# Patient Record
Sex: FEMALE | Race: BLACK OR AFRICAN AMERICAN | Employment: FULL TIME | URBAN - METROPOLITAN AREA
[De-identification: names, ages, dates, MRNs, and addresses within clinical notes are randomized per-mention and may not be internally consistent; named-entity substitution may affect disease eponyms.]

---

## 2018-01-19 ENCOUNTER — HOSPITAL ENCOUNTER (OUTPATIENT)
Dept: OTHER | Age: 38
Discharge: HOME | End: 2018-01-19
Attending: PREVENTIVE MEDICINE | Admitting: PREVENTIVE MEDICINE

## 2018-01-22 LAB
MUV IGG SER QL: 21.6
NIL (NEGATIVE) SPOT CONTROL: 0
PANEL A SPOT COUNT: 0
PANEL B SPOT COUNT: 0
POSITIVE CONTROL SPOT COUNT: >20
RUBELLA ANTIBODY IGG: 117
RUBEOLA (MEASLES) AB IGG: 35.5
TB CELL IMMUNE MEASURE: NEGATIVE
VARICELLA-ZOSTER VIRUS AB, IGG: 1543

## 2018-01-26 ENCOUNTER — TELEPHONE (OUTPATIENT)
Dept: CARDIOLOGY CLINIC | Age: 38
End: 2018-01-26

## 2019-01-09 ENCOUNTER — HOSPITAL ENCOUNTER (EMERGENCY)
Age: 39
Discharge: HOME | End: 2019-01-09
Attending: EMERGENCY MEDICINE
Payer: COMMERCIAL

## 2019-01-09 ENCOUNTER — APPOINTMENT (OUTPATIENT)
Dept: GENERAL RADIOLOGY | Age: 39
End: 2019-01-09
Payer: COMMERCIAL

## 2019-01-09 VITALS
OXYGEN SATURATION: 100 % | DIASTOLIC BLOOD PRESSURE: 101 MMHG | WEIGHT: 170 LBS | HEART RATE: 80 BPM | RESPIRATION RATE: 22 BRPM | HEIGHT: 60 IN | SYSTOLIC BLOOD PRESSURE: 155 MMHG | BODY MASS INDEX: 33.38 KG/M2 | TEMPERATURE: 97.4 F

## 2019-01-09 DIAGNOSIS — F41.1 ANXIETY STATE: ICD-10-CM

## 2019-01-09 DIAGNOSIS — R07.9 CHEST PAIN, UNSPECIFIED TYPE: Primary | ICD-10-CM

## 2019-01-09 LAB
ALBUMIN SERPL-MCNC: 4 GM/DL (ref 3.4–5)
ALP BLD-CCNC: 109 IU/L (ref 40–129)
ALT SERPL-CCNC: 15 U/L (ref 10–40)
ANION GAP SERPL CALCULATED.3IONS-SCNC: 9 MMOL/L (ref 4–16)
AST SERPL-CCNC: 17 IU/L (ref 15–37)
BASOPHILS ABSOLUTE: 0 K/CU MM
BASOPHILS RELATIVE PERCENT: 0.4 % (ref 0–1)
BILIRUB SERPL-MCNC: 0.3 MG/DL (ref 0–1)
BUN BLDV-MCNC: 13 MG/DL (ref 6–23)
CALCIUM SERPL-MCNC: 9.8 MG/DL (ref 8.3–10.6)
CHLORIDE BLD-SCNC: 108 MMOL/L (ref 99–110)
CO2: 23 MMOL/L (ref 21–32)
CREAT SERPL-MCNC: 0.8 MG/DL (ref 0.6–1.1)
DIFFERENTIAL TYPE: ABNORMAL
EOSINOPHILS ABSOLUTE: 0.2 K/CU MM
EOSINOPHILS RELATIVE PERCENT: 2 % (ref 0–3)
GFR AFRICAN AMERICAN: >60 ML/MIN/1.73M2
GFR NON-AFRICAN AMERICAN: >60 ML/MIN/1.73M2
GLUCOSE BLD-MCNC: 90 MG/DL (ref 70–99)
HCT VFR BLD CALC: 40.4 % (ref 37–47)
HEMOGLOBIN: 13.3 GM/DL (ref 12.5–16)
IMMATURE NEUTROPHIL %: 0.3 % (ref 0–0.43)
LYMPHOCYTES ABSOLUTE: 3.2 K/CU MM
LYMPHOCYTES RELATIVE PERCENT: 36.3 % (ref 24–44)
MCH RBC QN AUTO: 29.8 PG (ref 27–31)
MCHC RBC AUTO-ENTMCNC: 32.9 % (ref 32–36)
MCV RBC AUTO: 90.6 FL (ref 78–100)
MONOCYTES ABSOLUTE: 0.6 K/CU MM
MONOCYTES RELATIVE PERCENT: 6.2 % (ref 0–4)
NUCLEATED RBC %: 0 %
PDW BLD-RTO: 12.8 % (ref 11.7–14.9)
PLATELET # BLD: 264 K/CU MM (ref 140–440)
PMV BLD AUTO: 10.3 FL (ref 7.5–11.1)
POTASSIUM SERPL-SCNC: 3.6 MMOL/L (ref 3.5–5.1)
RBC # BLD: 4.46 M/CU MM (ref 4.2–5.4)
SEGMENTED NEUTROPHILS ABSOLUTE COUNT: 4.9 K/CU MM
SEGMENTED NEUTROPHILS RELATIVE PERCENT: 54.8 % (ref 36–66)
SODIUM BLD-SCNC: 140 MMOL/L (ref 135–145)
TOTAL IMMATURE NEUTOROPHIL: 0.03 K/CU MM
TOTAL NUCLEATED RBC: 0 K/CU MM
TOTAL PROTEIN: 7.9 GM/DL (ref 6.4–8.2)
TROPONIN T: <0.01 NG/ML
WBC # BLD: 8.9 K/CU MM (ref 4–10.5)

## 2019-01-09 PROCEDURE — 80053 COMPREHEN METABOLIC PANEL: CPT

## 2019-01-09 PROCEDURE — 85025 COMPLETE CBC W/AUTO DIFF WBC: CPT

## 2019-01-09 PROCEDURE — 71046 X-RAY EXAM CHEST 2 VIEWS: CPT

## 2019-01-09 PROCEDURE — 93005 ELECTROCARDIOGRAM TRACING: CPT | Performed by: EMERGENCY MEDICINE

## 2019-01-09 PROCEDURE — 6370000000 HC RX 637 (ALT 250 FOR IP): Performed by: EMERGENCY MEDICINE

## 2019-01-09 PROCEDURE — 84484 ASSAY OF TROPONIN QUANT: CPT

## 2019-01-09 PROCEDURE — 36415 COLL VENOUS BLD VENIPUNCTURE: CPT

## 2019-01-09 PROCEDURE — 99285 EMERGENCY DEPT VISIT HI MDM: CPT

## 2019-01-09 RX ORDER — ALPRAZOLAM 0.25 MG/1
0.25 TABLET ORAL 2 TIMES DAILY PRN
Qty: 5 TABLET | Refills: 0 | Status: SHIPPED | OUTPATIENT
Start: 2019-01-09 | End: 2019-03-05 | Stop reason: ALTCHOICE

## 2019-01-09 RX ORDER — ALPRAZOLAM 0.25 MG/1
0.25 TABLET ORAL ONCE
Status: COMPLETED | OUTPATIENT
Start: 2019-01-09 | End: 2019-01-09

## 2019-01-09 RX ADMIN — ALPRAZOLAM 0.25 MG: 0.25 TABLET ORAL at 20:39

## 2019-01-09 ASSESSMENT — PAIN SCALES - GENERAL: PAINLEVEL_OUTOF10: 7

## 2019-01-09 ASSESSMENT — PAIN DESCRIPTION - DESCRIPTORS: DESCRIPTORS: ACHING

## 2019-01-09 ASSESSMENT — PAIN DESCRIPTION - LOCATION: LOCATION: CHEST

## 2019-01-09 ASSESSMENT — PAIN DESCRIPTION - PAIN TYPE: TYPE: ACUTE PAIN

## 2019-01-10 PROCEDURE — 93010 ELECTROCARDIOGRAM REPORT: CPT | Performed by: INTERNAL MEDICINE

## 2019-01-11 LAB
EKG ATRIAL RATE: 85 BPM
EKG DIAGNOSIS: NORMAL
EKG P AXIS: 57 DEGREES
EKG P-R INTERVAL: 152 MS
EKG Q-T INTERVAL: 356 MS
EKG QRS DURATION: 82 MS
EKG QTC CALCULATION (BAZETT): 423 MS
EKG R AXIS: 44 DEGREES
EKG T AXIS: 50 DEGREES
EKG VENTRICULAR RATE: 85 BPM

## 2019-03-05 ENCOUNTER — HOSPITAL ENCOUNTER (EMERGENCY)
Age: 39
Discharge: HOME | End: 2019-03-05
Attending: EMERGENCY MEDICINE
Payer: COMMERCIAL

## 2019-03-05 VITALS
SYSTOLIC BLOOD PRESSURE: 161 MMHG | RESPIRATION RATE: 18 BRPM | DIASTOLIC BLOOD PRESSURE: 115 MMHG | OXYGEN SATURATION: 100 % | HEIGHT: 60 IN | HEART RATE: 67 BPM | WEIGHT: 170 LBS | TEMPERATURE: 97.8 F | BODY MASS INDEX: 33.38 KG/M2

## 2019-03-05 DIAGNOSIS — R20.2 PARESTHESIA OF BILATERAL LEGS: Primary | ICD-10-CM

## 2019-03-05 LAB
ALBUMIN SERPL-MCNC: 4.1 GM/DL (ref 3.4–5)
ALP BLD-CCNC: 101 IU/L (ref 40–129)
ALT SERPL-CCNC: 19 U/L (ref 10–40)
ANION GAP SERPL CALCULATED.3IONS-SCNC: 10 MMOL/L (ref 4–16)
AST SERPL-CCNC: 17 IU/L (ref 15–37)
BASOPHILS ABSOLUTE: 0 K/CU MM
BASOPHILS RELATIVE PERCENT: 0.7 % (ref 0–1)
BILIRUB SERPL-MCNC: 0.3 MG/DL (ref 0–1)
BUN BLDV-MCNC: 12 MG/DL (ref 6–23)
CALCIUM SERPL-MCNC: 8.5 MG/DL (ref 8.3–10.6)
CHLORIDE BLD-SCNC: 101 MMOL/L (ref 99–110)
CO2: 25 MMOL/L (ref 21–32)
CREAT SERPL-MCNC: 0.8 MG/DL (ref 0.6–1.1)
DIFFERENTIAL TYPE: ABNORMAL
EOSINOPHILS ABSOLUTE: 0.1 K/CU MM
EOSINOPHILS RELATIVE PERCENT: 2.3 % (ref 0–3)
ERYTHROCYTE SEDIMENTATION RATE: 16 MM/HR (ref 0–20)
GFR AFRICAN AMERICAN: >60 ML/MIN/1.73M2
GFR NON-AFRICAN AMERICAN: >60 ML/MIN/1.73M2
GLUCOSE BLD-MCNC: 93 MG/DL (ref 70–99)
HCG QUALITATIVE: NEGATIVE
HCT VFR BLD CALC: 42 % (ref 37–47)
HEMOGLOBIN: 13.5 GM/DL (ref 12.5–16)
IMMATURE NEUTROPHIL %: 0.2 % (ref 0–0.43)
LYMPHOCYTES ABSOLUTE: 1.8 K/CU MM
LYMPHOCYTES RELATIVE PERCENT: 31.8 % (ref 24–44)
MAGNESIUM: 1.7 MG/DL (ref 1.8–2.4)
MCH RBC QN AUTO: 29.3 PG (ref 27–31)
MCHC RBC AUTO-ENTMCNC: 32.1 % (ref 32–36)
MCV RBC AUTO: 91.1 FL (ref 78–100)
MONOCYTES ABSOLUTE: 0.4 K/CU MM
MONOCYTES RELATIVE PERCENT: 6.6 % (ref 0–4)
NUCLEATED RBC %: 0 %
PDW BLD-RTO: 12.9 % (ref 11.7–14.9)
PLATELET # BLD: 248 K/CU MM (ref 140–440)
PMV BLD AUTO: 10.4 FL (ref 7.5–11.1)
POTASSIUM SERPL-SCNC: 3.6 MMOL/L (ref 3.5–5.1)
RBC # BLD: 4.61 M/CU MM (ref 4.2–5.4)
SEGMENTED NEUTROPHILS ABSOLUTE COUNT: 3.3 K/CU MM
SEGMENTED NEUTROPHILS RELATIVE PERCENT: 58.4 % (ref 36–66)
SODIUM BLD-SCNC: 136 MMOL/L (ref 135–145)
TOTAL IMMATURE NEUTOROPHIL: 0.01 K/CU MM
TOTAL NUCLEATED RBC: 0 K/CU MM
TOTAL PROTEIN: 7.5 GM/DL (ref 6.4–8.2)
WBC # BLD: 5.6 K/CU MM (ref 4–10.5)

## 2019-03-05 PROCEDURE — 99283 EMERGENCY DEPT VISIT LOW MDM: CPT

## 2019-03-05 PROCEDURE — 85652 RBC SED RATE AUTOMATED: CPT

## 2019-03-05 PROCEDURE — 85025 COMPLETE CBC W/AUTO DIFF WBC: CPT

## 2019-03-05 PROCEDURE — 83735 ASSAY OF MAGNESIUM: CPT

## 2019-03-05 PROCEDURE — 84703 CHORIONIC GONADOTROPIN ASSAY: CPT

## 2019-03-05 PROCEDURE — 80053 COMPREHEN METABOLIC PANEL: CPT

## 2019-03-05 RX ORDER — PREDNISONE 20 MG/1
40 TABLET ORAL DAILY
Qty: 10 TABLET | Refills: 0 | Status: SHIPPED | OUTPATIENT
Start: 2019-03-05 | End: 2019-03-10

## 2019-03-05 RX ORDER — METOPROLOL SUCCINATE 25 MG/1
25 TABLET, EXTENDED RELEASE ORAL DAILY
Qty: 30 TABLET | Refills: 0 | Status: SHIPPED | OUTPATIENT
Start: 2019-03-05

## 2019-03-05 RX ORDER — METOPROLOL SUCCINATE 50 MG/1
50 TABLET, EXTENDED RELEASE ORAL DAILY
Qty: 30 TABLET | Refills: 0 | Status: SHIPPED | OUTPATIENT
Start: 2019-03-05 | End: 2019-03-05 | Stop reason: SDUPTHER

## 2019-03-13 ENCOUNTER — HOSPITAL ENCOUNTER (EMERGENCY)
Age: 39
Discharge: HOME | End: 2019-03-13
Attending: EMERGENCY MEDICINE
Payer: COMMERCIAL

## 2019-03-13 ENCOUNTER — APPOINTMENT (OUTPATIENT)
Dept: CT IMAGING | Age: 39
End: 2019-03-13
Payer: COMMERCIAL

## 2019-03-13 VITALS
HEIGHT: 60 IN | OXYGEN SATURATION: 100 % | WEIGHT: 170 LBS | RESPIRATION RATE: 16 BRPM | HEART RATE: 78 BPM | TEMPERATURE: 98 F | SYSTOLIC BLOOD PRESSURE: 117 MMHG | DIASTOLIC BLOOD PRESSURE: 79 MMHG | BODY MASS INDEX: 33.38 KG/M2

## 2019-03-13 DIAGNOSIS — M51.36 DEGENERATIVE DISC DISEASE, LUMBAR: ICD-10-CM

## 2019-03-13 DIAGNOSIS — R20.2 BILATERAL LEG PARESTHESIA: Primary | ICD-10-CM

## 2019-03-13 LAB
BACTERIA: ABNORMAL /HPF
BASOPHILS ABSOLUTE: 0 K/CU MM
BASOPHILS RELATIVE PERCENT: 0.5 % (ref 0–1)
BILIRUBIN URINE: NEGATIVE MG/DL
BLOOD, URINE: NEGATIVE
CLARITY: CLEAR
COLOR: YELLOW
DIFFERENTIAL TYPE: ABNORMAL
EOSINOPHILS ABSOLUTE: 0.2 K/CU MM
EOSINOPHILS RELATIVE PERCENT: 2.2 % (ref 0–3)
GLUCOSE, URINE: NEGATIVE MG/DL
HCT VFR BLD CALC: 40.4 % (ref 37–47)
HEMOGLOBIN: 12.9 GM/DL (ref 12.5–16)
IMMATURE NEUTROPHIL %: 0.6 % (ref 0–0.43)
INTERPRETATION: NORMAL
KETONES, URINE: NEGATIVE MG/DL
LEUKOCYTE ESTERASE, URINE: NEGATIVE
LYMPHOCYTES ABSOLUTE: 2.9 K/CU MM
LYMPHOCYTES RELATIVE PERCENT: 36.5 % (ref 24–44)
MCH RBC QN AUTO: 29.1 PG (ref 27–31)
MCHC RBC AUTO-ENTMCNC: 31.9 % (ref 32–36)
MCV RBC AUTO: 91.2 FL (ref 78–100)
MONOCYTES ABSOLUTE: 0.7 K/CU MM
MONOCYTES RELATIVE PERCENT: 8.6 % (ref 0–4)
MUCUS: ABNORMAL HPF
NITRITE URINE, QUANTITATIVE: NEGATIVE
NUCLEATED RBC %: 0 %
PDW BLD-RTO: 12.9 % (ref 11.7–14.9)
PH, URINE: 6 (ref 5–8)
PLATELET # BLD: 250 K/CU MM (ref 140–440)
PMV BLD AUTO: 10.7 FL (ref 7.5–11.1)
PREGNANCY, URINE: NEGATIVE
PROTEIN UA: NEGATIVE MG/DL
RBC # BLD: 4.43 M/CU MM (ref 4.2–5.4)
RBC URINE: 1 /HPF (ref 0–6)
REASON FOR REJECTION: NORMAL
REASON FOR REJECTION: NORMAL
REJECTED TEST: NORMAL
SEGMENTED NEUTROPHILS ABSOLUTE COUNT: 4.1 K/CU MM
SEGMENTED NEUTROPHILS RELATIVE PERCENT: 51.6 % (ref 36–66)
SPECIFIC GRAVITY UA: 1.03 (ref 1–1.03)
SPECIFIC GRAVITY, URINE: 1.03 (ref 1–1.03)
SQUAMOUS EPITHELIAL: 6 /HPF
TOTAL IMMATURE NEUTOROPHIL: 0.05 K/CU MM
TOTAL NUCLEATED RBC: 0 K/CU MM
TRICHOMONAS: ABNORMAL /HPF
UROBILINOGEN, URINE: NORMAL MG/DL (ref 0.2–1)
WBC # BLD: 7.9 K/CU MM (ref 4–10.5)
WBC UA: 1 /HPF (ref 0–5)

## 2019-03-13 PROCEDURE — 80053 COMPREHEN METABOLIC PANEL: CPT

## 2019-03-13 PROCEDURE — 84703 CHORIONIC GONADOTROPIN ASSAY: CPT

## 2019-03-13 PROCEDURE — 72131 CT LUMBAR SPINE W/O DYE: CPT

## 2019-03-13 PROCEDURE — 6370000000 HC RX 637 (ALT 250 FOR IP): Performed by: EMERGENCY MEDICINE

## 2019-03-13 PROCEDURE — 99283 EMERGENCY DEPT VISIT LOW MDM: CPT

## 2019-03-13 PROCEDURE — 81001 URINALYSIS AUTO W/SCOPE: CPT

## 2019-03-13 PROCEDURE — 85025 COMPLETE CBC W/AUTO DIFF WBC: CPT

## 2019-03-13 PROCEDURE — 70450 CT HEAD/BRAIN W/O DYE: CPT

## 2019-03-13 PROCEDURE — 81025 URINE PREGNANCY TEST: CPT

## 2019-03-13 RX ORDER — LIDOCAINE 4 G/G
1 PATCH TOPICAL DAILY
Status: DISCONTINUED | OUTPATIENT
Start: 2019-03-13 | End: 2019-03-13 | Stop reason: HOSPADM

## 2019-03-13 RX ORDER — METHOCARBAMOL 500 MG/1
500 TABLET, FILM COATED ORAL 4 TIMES DAILY
Qty: 20 TABLET | Refills: 0 | Status: SHIPPED | OUTPATIENT
Start: 2019-03-13 | End: 2021-04-17

## 2019-03-13 RX ORDER — NAPROXEN 500 MG/1
500 TABLET ORAL 2 TIMES DAILY PRN
Qty: 30 TABLET | Refills: 0 | Status: SHIPPED | OUTPATIENT
Start: 2019-03-13 | End: 2019-05-17 | Stop reason: SDUPTHER

## 2019-03-13 RX ORDER — NAPROXEN 250 MG/1
500 TABLET ORAL ONCE
Status: COMPLETED | OUTPATIENT
Start: 2019-03-13 | End: 2019-03-13

## 2019-03-13 RX ORDER — PREDNISONE 20 MG/1
60 TABLET ORAL ONCE
Status: COMPLETED | OUTPATIENT
Start: 2019-03-13 | End: 2019-03-13

## 2019-03-13 RX ADMIN — NAPROXEN 500 MG: 250 TABLET ORAL at 09:56

## 2019-03-13 RX ADMIN — PREDNISONE 60 MG: 20 TABLET ORAL at 09:56

## 2019-03-13 ASSESSMENT — PAIN SCALES - GENERAL: PAINLEVEL_OUTOF10: 7

## 2019-04-13 ENCOUNTER — APPOINTMENT (OUTPATIENT)
Dept: GENERAL RADIOLOGY | Age: 39
End: 2019-04-13
Payer: COMMERCIAL

## 2019-04-13 ENCOUNTER — HOSPITAL ENCOUNTER (OUTPATIENT)
Age: 39
Setting detail: OBSERVATION
Discharge: HOME | End: 2019-04-14
Attending: EMERGENCY MEDICINE | Admitting: HOSPITALIST
Payer: COMMERCIAL

## 2019-04-13 ENCOUNTER — APPOINTMENT (OUTPATIENT)
Dept: MRI IMAGING | Age: 39
End: 2019-04-13
Payer: COMMERCIAL

## 2019-04-13 ENCOUNTER — APPOINTMENT (OUTPATIENT)
Dept: CT IMAGING | Age: 39
End: 2019-04-13
Payer: COMMERCIAL

## 2019-04-13 DIAGNOSIS — H53.2 DOUBLE VISION: Primary | ICD-10-CM

## 2019-04-13 DIAGNOSIS — R20.2 PARESTHESIA: ICD-10-CM

## 2019-04-13 PROBLEM — H53.9 VISION CHANGES: Status: ACTIVE | Noted: 2019-04-13

## 2019-04-13 LAB
ALBUMIN SERPL-MCNC: 4 GM/DL (ref 3.4–5)
ALP BLD-CCNC: 99 IU/L (ref 40–129)
ALT SERPL-CCNC: 33 U/L (ref 10–40)
ANION GAP SERPL CALCULATED.3IONS-SCNC: 10 MMOL/L (ref 4–16)
AST SERPL-CCNC: 41 IU/L (ref 15–37)
ATYPICAL LYMPHOCYTE ABSOLUTE COUNT: ABNORMAL
BACTERIA: ABNORMAL /HPF
BANDED NEUTROPHILS ABSOLUTE COUNT: 0.16 K/CU MM
BANDED NEUTROPHILS RELATIVE PERCENT: 7 % (ref 5–11)
BASOPHILS ABSOLUTE: 0 K/CU MM
BASOPHILS RELATIVE PERCENT: 1 % (ref 0–1)
BILIRUB SERPL-MCNC: 0.7 MG/DL (ref 0–1)
BILIRUBIN URINE: NEGATIVE MG/DL
BLOOD, URINE: ABNORMAL
BUN BLDV-MCNC: 11 MG/DL (ref 6–23)
CALCIUM SERPL-MCNC: 8.9 MG/DL (ref 8.3–10.6)
CHLORIDE BLD-SCNC: 107 MMOL/L (ref 99–110)
CLARITY: CLEAR
CO2: 22 MMOL/L (ref 21–32)
COLOR: YELLOW
CREAT SERPL-MCNC: 0.8 MG/DL (ref 0.6–1.1)
DIFFERENTIAL TYPE: ABNORMAL
EOSINOPHILS ABSOLUTE: 0 K/CU MM
EOSINOPHILS RELATIVE PERCENT: 1 % (ref 0–3)
GFR AFRICAN AMERICAN: >60 ML/MIN/1.73M2
GFR NON-AFRICAN AMERICAN: >60 ML/MIN/1.73M2
GLUCOSE BLD-MCNC: 106 MG/DL (ref 70–99)
GLUCOSE, URINE: NEGATIVE MG/DL
HCG QUALITATIVE: NEGATIVE
HCT VFR BLD CALC: 39.6 % (ref 37–47)
HEMOGLOBIN: 13.3 GM/DL (ref 12.5–16)
INR BLD: 1.16 INDEX
KETONES, URINE: NEGATIVE MG/DL
LEUKOCYTE ESTERASE, URINE: NEGATIVE
LYMPHOCYTES ABSOLUTE: 0.6 K/CU MM
LYMPHOCYTES RELATIVE PERCENT: 27 % (ref 24–44)
MCH RBC QN AUTO: 29 PG (ref 27–31)
MCHC RBC AUTO-ENTMCNC: 33.6 % (ref 32–36)
MCV RBC AUTO: 86.5 FL (ref 78–100)
MONOCYTES ABSOLUTE: 0.1 K/CU MM
MONOCYTES RELATIVE PERCENT: 4 % (ref 0–4)
MUCUS: ABNORMAL HPF
NITRITE URINE, QUANTITATIVE: NEGATIVE
PDW BLD-RTO: 12.8 % (ref 11.7–14.9)
PH, URINE: 5 (ref 5–8)
PLATELET # BLD: 129 K/CU MM (ref 140–440)
PLT MORPHOLOGY: ABNORMAL
PMV BLD AUTO: 10 FL (ref 7.5–11.1)
POTASSIUM SERPL-SCNC: 3.8 MMOL/L (ref 3.5–5.1)
PROTEIN UA: NEGATIVE MG/DL
PROTHROMBIN TIME: 13.2 SECONDS (ref 9.12–12.5)
RBC # BLD: 4.58 M/CU MM (ref 4.2–5.4)
RBC # BLD: ABNORMAL 10*6/UL
RBC URINE: 4 /HPF (ref 0–6)
SEGMENTED NEUTROPHILS ABSOLUTE COUNT: 1.4 K/CU MM
SEGMENTED NEUTROPHILS RELATIVE PERCENT: 60 % (ref 36–66)
SODIUM BLD-SCNC: 139 MMOL/L (ref 135–145)
SPECIFIC GRAVITY UA: 1.01 (ref 1–1.03)
SQUAMOUS EPITHELIAL: 3 /HPF
TOTAL PROTEIN: 7.7 GM/DL (ref 6.4–8.2)
TRICHOMONAS: ABNORMAL /HPF
TROPONIN T: <0.01 NG/ML
UROBILINOGEN, URINE: NORMAL MG/DL (ref 0.2–1)
WBC # BLD: 2.3 K/CU MM (ref 4–10.5)
WBC UA: 1 /HPF (ref 0–5)

## 2019-04-13 PROCEDURE — 84484 ASSAY OF TROPONIN QUANT: CPT

## 2019-04-13 PROCEDURE — 85007 BL SMEAR W/DIFF WBC COUNT: CPT

## 2019-04-13 PROCEDURE — 6360000002 HC RX W HCPCS: Performed by: HOSPITALIST

## 2019-04-13 PROCEDURE — 93005 ELECTROCARDIOGRAM TRACING: CPT | Performed by: EMERGENCY MEDICINE

## 2019-04-13 PROCEDURE — 70553 MRI BRAIN STEM W/O & W/DYE: CPT

## 2019-04-13 PROCEDURE — 85027 COMPLETE CBC AUTOMATED: CPT

## 2019-04-13 PROCEDURE — A9579 GAD-BASE MR CONTRAST NOS,1ML: HCPCS | Performed by: HOSPITALIST

## 2019-04-13 PROCEDURE — 70450 CT HEAD/BRAIN W/O DYE: CPT

## 2019-04-13 PROCEDURE — 2580000003 HC RX 258: Performed by: HOSPITALIST

## 2019-04-13 PROCEDURE — 93010 ELECTROCARDIOGRAM REPORT: CPT | Performed by: INTERNAL MEDICINE

## 2019-04-13 PROCEDURE — G0378 HOSPITAL OBSERVATION PER HR: HCPCS

## 2019-04-13 PROCEDURE — 94761 N-INVAS EAR/PLS OXIMETRY MLT: CPT

## 2019-04-13 PROCEDURE — 71045 X-RAY EXAM CHEST 1 VIEW: CPT

## 2019-04-13 PROCEDURE — 85610 PROTHROMBIN TIME: CPT

## 2019-04-13 PROCEDURE — 99285 EMERGENCY DEPT VISIT HI MDM: CPT

## 2019-04-13 PROCEDURE — 6360000004 HC RX CONTRAST MEDICATION: Performed by: HOSPITALIST

## 2019-04-13 PROCEDURE — 81001 URINALYSIS AUTO W/SCOPE: CPT

## 2019-04-13 PROCEDURE — 84703 CHORIONIC GONADOTROPIN ASSAY: CPT

## 2019-04-13 PROCEDURE — 6370000000 HC RX 637 (ALT 250 FOR IP): Performed by: HOSPITALIST

## 2019-04-13 PROCEDURE — 96372 THER/PROPH/DIAG INJ SC/IM: CPT

## 2019-04-13 PROCEDURE — 80053 COMPREHEN METABOLIC PANEL: CPT

## 2019-04-13 PROCEDURE — 72156 MRI NECK SPINE W/O & W/DYE: CPT

## 2019-04-13 RX ORDER — ONDANSETRON 2 MG/ML
4 INJECTION INTRAMUSCULAR; INTRAVENOUS EVERY 6 HOURS PRN
Status: DISCONTINUED | OUTPATIENT
Start: 2019-04-13 | End: 2019-04-14 | Stop reason: HOSPADM

## 2019-04-13 RX ORDER — SODIUM CHLORIDE 0.9 % (FLUSH) 0.9 %
10 SYRINGE (ML) INJECTION EVERY 12 HOURS SCHEDULED
Status: DISCONTINUED | OUTPATIENT
Start: 2019-04-13 | End: 2019-04-14 | Stop reason: HOSPADM

## 2019-04-13 RX ORDER — METHOCARBAMOL 500 MG/1
500 TABLET, FILM COATED ORAL 4 TIMES DAILY
Status: DISCONTINUED | OUTPATIENT
Start: 2019-04-13 | End: 2019-04-14 | Stop reason: HOSPADM

## 2019-04-13 RX ORDER — METOPROLOL SUCCINATE 25 MG/1
25 TABLET, EXTENDED RELEASE ORAL DAILY
Status: DISCONTINUED | OUTPATIENT
Start: 2019-04-13 | End: 2019-04-14 | Stop reason: HOSPADM

## 2019-04-13 RX ORDER — NAPROXEN 250 MG/1
500 TABLET ORAL 2 TIMES DAILY PRN
Status: DISCONTINUED | OUTPATIENT
Start: 2019-04-13 | End: 2019-04-14 | Stop reason: HOSPADM

## 2019-04-13 RX ORDER — ACETAMINOPHEN 325 MG/1
650 TABLET ORAL EVERY 4 HOURS PRN
Status: DISCONTINUED | OUTPATIENT
Start: 2019-04-13 | End: 2019-04-14 | Stop reason: HOSPADM

## 2019-04-13 RX ORDER — SODIUM CHLORIDE 0.9 % (FLUSH) 0.9 %
10 SYRINGE (ML) INJECTION PRN
Status: DISCONTINUED | OUTPATIENT
Start: 2019-04-13 | End: 2019-04-14 | Stop reason: HOSPADM

## 2019-04-13 RX ADMIN — METOPROLOL SUCCINATE 25 MG: 25 TABLET, EXTENDED RELEASE ORAL at 12:08

## 2019-04-13 RX ADMIN — SODIUM CHLORIDE, PRESERVATIVE FREE 10 ML: 5 INJECTION INTRAVENOUS at 20:56

## 2019-04-13 RX ADMIN — METHOCARBAMOL TABLETS 500 MG: 500 TABLET, COATED ORAL at 12:08

## 2019-04-13 RX ADMIN — METHOCARBAMOL TABLETS 500 MG: 500 TABLET, COATED ORAL at 17:01

## 2019-04-13 RX ADMIN — GADOTERIDOL 17 ML: 279.3 INJECTION, SOLUTION INTRAVENOUS at 14:15

## 2019-04-13 RX ADMIN — METHOCARBAMOL TABLETS 500 MG: 500 TABLET, COATED ORAL at 20:56

## 2019-04-13 RX ADMIN — GADOTERIDOL 17 ML: 279.3 INJECTION, SOLUTION INTRAVENOUS at 14:04

## 2019-04-13 RX ADMIN — ENOXAPARIN SODIUM 40 MG: 40 INJECTION SUBCUTANEOUS at 12:08

## 2019-04-13 RX ADMIN — SODIUM CHLORIDE, PRESERVATIVE FREE 10 ML: 5 INJECTION INTRAVENOUS at 12:09

## 2019-04-13 ASSESSMENT — PAIN SCALES - GENERAL
PAINLEVEL_OUTOF10: 7
PAINLEVEL_OUTOF10: 0
PAINLEVEL_OUTOF10: 0

## 2019-04-13 ASSESSMENT — PAIN DESCRIPTION - LOCATION: LOCATION: ARM

## 2019-04-13 ASSESSMENT — PAIN DESCRIPTION - ORIENTATION: ORIENTATION: LEFT;RIGHT

## 2019-04-13 ASSESSMENT — PAIN DESCRIPTION - PAIN TYPE: TYPE: ACUTE PAIN

## 2019-04-13 NOTE — ED NOTES
Report to [facility]. Pt ready for transport.      [address] [place] [address], [address] [facility]  04/13/19 0268

## 2019-04-13 NOTE — ED NOTES
Pt has history of High Blood Pressure and reports taking her medications     Dominik Erazo RN  04/13/19 9107

## 2019-04-13 NOTE — ED NOTES
Bedside handoff report received from [facility].  Care assumed     Erma Melvin RN  04/13/19 9716

## 2019-04-13 NOTE — ED NOTES
Pt is afraid of having MS, states she was to see a Neurologist due to not accepting her insurance     Nataly Hughes, RUSSELL  04/13/19 1494

## 2019-04-13 NOTE — H&P
[facility] History & Physical      Name: Ruba Casey    PCP: No primary care provider on file. Date of Admission: 4/13/2019    Admitting Physician: Juliette Espinosa MD Hospitalist    Date of Service: Pt seen/examined on 4/13/2019     Chief Complaint:  Blurred Vision and Tingling (in feet)    History Of Present Illness: The patient is a 41 y.o. female with history of hypertension who presented to ED with iintermittent bilateral feet tingling and acute Bilateral blurry vision. tthe patient has had intermittent  Bilateral feet  Tingling  That radiates up to her legs for the past 8 months. She has been seen by her  Primary care provider, being considered for MS that her neurological workup has been incomplete, has not seen a neurologist nor got MRI. Yissel Rose She developed  Bilateral blurry vision in the past several days which has been continuous. She has mild headache  Diffusely  In her head with some mild nausea without vomiting. She has no known history of migraine. She does have relatives who was diagnosed as MS according to the patient. She currently denies any weakness of extremities  And no problem with speech  At this time. The case was discussed with the ED provider. Past Medical History Reviewed:    Patient  has a past medical history of Anxiety, Headache(784.0), Hypertension, Migraine, and UTI (urinary tract infection). Past Surgical History Reviewed:    Patient  has a past surgical history that includes Tubal ligation. Medications Prior to Admission Reviewed:    Prior to Admission medications    Medication Sig Start Date End Date Taking? Authorizing Provider   methocarbamol (ROBAXIN) 500 MG tablet Take 1 tablet by mouth 4 times daily As needed for muscle spasm.  3/13/19   LANNY Sykes   naproxen (NAPROSYN) 500 MG tablet Take 1 tablet by mouth 2 times daily as needed for Pain 3/13/19   LANNY Sykes   metoprolol succinate (TOPROL XL) 25 MG extended release tablet Take 1 tablet by mouth daily 3/5/19   [employer], [place]       Allergies:  Phenergan [promethazine hcl]    Social History:    TOBACCO:   reports that she has never smoked. She has never used smokeless tobacco.  ETOH:   reports that she drinks alcohol. Drugs:  reports that she does not use drugs. Family History:  Reviewed in detail. Positive as follows: Two cousins have been diagnosed with MS in the last few years    ROS:  Ten systems were reviewed and otherwise acutely negative except as noted in the HPI and documented below:     Physical Exam:  Vitals:  BP (!) 152/109   Pulse 85   Temp 97.6 °F (36.4 °C) (Oral)   Resp 16   Ht 5' (1.524 m)   Wt 184 lb 3.2 oz (83.6 kg)   SpO2 98%   BMI 35.97 kg/m²   General: The patient appears as stated age. Well appearing, and in no distress. Mental status: Alert, Oriented x3. Coherent. No agitation. Eyes: JOSE. Normal conjunctiva. ENT/Mouth: normal appearing jaw and neck, no neck nodes or sinus tenderness. Clear oropharynx with moist mucous membrane. Cardiovascular:  normal rate, regular rhythm, normal S1, S2, no murmurs, rubs, clicks or gallops. No peripheral edema. Dorsal pedis pulses 2+ bilaterally. Respiratory: clear to auscultation, no wheezes, rales or rhonchi, symmetric air entry. Gastrointestinal: soft, nontender, nondistended, no masses or organomegaly. Genitourinary:  No CVA tenderness. Musculoskletal:  no clubbing or cyanosis. No joint swelling, warmth, or tenderness. Skin:  normal coloration and turgor, no rashes, no suspicious skin lesions noted. Neurologic: Normal speech, no focal findings or movement disorder noted. Hematologic/Lymphatic: No cervical lymphadenopathy. Data  (4-points for high level)  Laboratory this visit:  Reviewed    Radiology this visit:  Personally reviewed the following imaging films, and agree with the radiologist readings.     Ct Head Wo Contrast    Result Date: 4/13/2019  EXAMINATION: CT OF THE HEAD WITHOUT CONTRAST  4/13/2019 7:28 am TECHNIQUE: CT of the head was performed without the administration of intravenous contrast. Dose modulation, iterative reconstruction, and/or weight based adjustment of the mA/kV was utilized to reduce the radiation dose to as low as reasonably achievable. COMPARISON: March 13, 2019 HISTORY: ORDERING SYSTEM PROVIDED HISTORY: blurred vision/double vision, intermittent parasthesias TECHNOLOGIST PROVIDED HISTORY: CVA Has a \"code stroke\" or \"stroke alert\" been called? ->No Ordering Physician Provided Reason for Exam: blurred vision/double vision, intermittent parasthesias Acuity: Acute Type of Exam: Initial Additional signs and symptoms: none Relevant Medical/Surgical History: none FINDINGS: BRAIN/VENTRICLES: There is no acute intracranial hemorrhage, mass effect or midline shift. No abnormal extra-axial fluid collection. The gray-white differentiation is maintained without evidence of an acute infarct. There is no evidence of hydrocephalus. ORBITS: The visualized portion of the orbits demonstrate no acute abnormality. SINUSES: The visualized paranasal sinuses and mastoid air cells demonstrate no acute abnormality. SOFT TISSUES/SKULL:  No acute abnormality of the visualized skull or soft tissues. No acute intracranial abnormality. Mri Cervical Spine W Wo Contrast    Result Date: 4/13/2019  EXAMINATION: MRI OF THE CERVICAL SPINE WITHOUT AND WITH CONTRAST,  4/13/2019 1:07 pm: TECHNIQUE: Multiplanar multisequence MRI of the cervical spine was performed without and with the administration of intravenous contrast. COMPARISON: None HISTORY: ORDERING SYSTEM PROVIDED HISTORY: Demyelinating disease with spinal cord syx TECHNOLOGIST PROVIDED HISTORY: Ordering Physician Provided Reason for Exam: c/o visual disturbance and ha's for past 4 days; with left side weakness; nkt, no sx to either chase per pt.  17 mL ProHance     JG FINDINGS: BONES/ALIGNMENT: Generalized straightening of the cervical spine.  Marrow signal is unremarkable. Cervical vertebral bodies appear normal in height. SPINAL CORD: No convincing cervical cord signal abnormality. SOFT TISSUES: No abnormal enhancement of the cervical spine. No paraspinal mass identified. C2-C3: There is no significant disc protrusion, spinal canal stenosis or neural foraminal narrowing. C3-C4: Small central disc protrusion. Partial anterior subarachnoid effacement. Mild spinal canal stenosis. No significant neural foraminal narrowing. C4-C5: Moderate right and mild left neural foraminal narrowing due to facet and uncovertebral hypertrophy. Diffuse disc bulging and endplate spurring. Complete anterior and near complete posterior subarachnoid effacement. Cord flattening. AP dimension of the spinal canal measures 7 mm at midline. C5-C6: Moderate-severe bilateral neural foraminal narrowing due to facet and uncovertebral hypertrophy. Diffuse disc bulging and endplate spurring. Near complete anterior and posterior subarachnoid effacement. Cord flattening. AP dimension of the spinal canal measures 7 mm at midline. C6-C7: Small central disc protrusion. Partial anterior subarachnoid effacement. No significant neural foraminal narrowing. C7-T1: There is no significant disc protrusion, spinal canal stenosis or neural foraminal narrowing. Cord flattening and borderline compression at C4-C5 and C5-C6. AP dimension of the spinal canal measures 7 mm at midline. No definite cord signal abnormality to suggest demyelinating disease. Multilevel neural foraminal narrowing.      Xr Chest Portable    Result Date: 4/13/2019  EXAMINATION: SINGLE XRAY VIEW OF THE CHEST 4/13/2019 7:17 am COMPARISON: 01/09/2019 HISTORY: ORDERING SYSTEM PROVIDED HISTORY: CVA TECHNOLOGIST PROVIDED HISTORY: Reason for exam:->CVA Ordering Physician Provided Reason for Exam: CVA Acuity: Acute Type of Exam: Initial Additional signs and symptoms: Reports blurred vision and tingling in bilat Problems:    * No resolved hospital problems. *    acute vision change  Intermittent bilateral feet paresthesias  Concern for MS given her family history. - obtain an MRI brain and C-spine With and without contrast  - neurology consult requested. Hypertension  - continue metoprolol. Leukopenia, mild  - No intervention  Or workup plan at this time. Recheck CBC. DVT prophylaxis with Lovenox. The above assessments and plans were explained to the patient in lay language, who indicated understanding.       PHYSICIAN CERTIFICATION  I certify that Ignacio Severin is expected to be hospitalized for  less than 2 midnights based on the above assessment and plan:      MD Mike Weaver Hospitalist at [facility]  Office Phone: [phone]

## 2019-04-13 NOTE — ED PROVIDER NOTES
Triage Chief Complaint:   Blurred Vision and Tingling (in feet)    [place]:  Mary Jo Osman is a 41 y.o. female that presents with complaint of blurred vision, double vision and tingling sensations in legs. Has had intermittent symptoms for months, where will have periodic weakness and tingling. Reports she has been seen multiple times by different people, has been told she may have MS but has not been able to follow-up with a neurologist due to insurance problems. She reports over the last 2-3 days she has felt generally weak, and having intermittent blurred vision and double vision. It is xpod-nu-bzyy double vision. She does not note any time of day that it is worse or anything that makes it worse, states it comes and goes. Sometimes it is blurred as well. No floaters or scotomas. She does have a history of migraines, reports she has not had migraines in years. She will occasionally get headaches. She feels achy all over today. No specific back pain. No trauma or injury. She complains of tingling in her upper thighs and also tingling in her feet. It does not connect across her legs. She feels generally weak when she attempts to walk. States she fell twice yesterday but did not strike her head. No neck pain. No focal back pain. No dysuria or hematuria. No nausea or vomiting or diarrhea. No recent illnesses. No cough or fevers. No shortness of breath or chest pain. She has a history of high blood pressure which she takes medication for, otherwise is not on any chronic medications. Two cousins have been diagnosed with MS in the last few years. ROS:  10 systems reviewed and negative except as above. Past Medical History:   Diagnosis Date    Anxiety     Headache(784.0)     Hypertension     Migraine     UTI (urinary tract infection)      Past Surgical History:   Procedure Laterality Date    TUBAL LIGATION       History reviewed. No pertinent family history.   Social History Socioeconomic History    Marital status:      Spouse name: Not on file    Number of children: Not on file    Years of education: Not on file    Highest education level: Not on file   Occupational History    Not on file   Social Needs    Financial resource strain: Not on file    Food insecurity:     Worry: Not on file     Inability: Not on file    Transportation needs:     Medical: Not on file     Non-medical: Not on file   Tobacco Use    Smoking status: Never Smoker    Smokeless tobacco: Never Used   Substance and Sexual Activity    Alcohol use: Yes     Comment: occasionally     Drug use: No    Sexual activity: Not on file   Lifestyle    Physical activity:     Days per week: Not on file     Minutes per session: Not on file    Stress: Not on file   Relationships    Social connections:     Talks on phone: Not on file     Gets together: Not on file     Attends Cheondoism service: Not on file     Active member of club or organization: Not on file     Attends meetings of clubs or organizations: Not on file     Relationship status: Not on file    Intimate partner violence:     Fear of current or ex partner: Not on file     Emotionally abused: Not on file     Physically abused: Not on file     Forced sexual activity: Not on file   Other Topics Concern    Not on file   Social History Narrative    Not on file     No current facility-administered medications for this encounter. Current Outpatient Medications   Medication Sig Dispense Refill    methocarbamol (ROBAXIN) 500 MG tablet Take 1 tablet by mouth 4 times daily As needed for muscle spasm.  20 tablet 0    naproxen (NAPROSYN) 500 MG tablet Take 1 tablet by mouth 2 times daily as needed for Pain 30 tablet 0    metoprolol succinate (TOPROL XL) 25 MG extended release tablet Take 1 tablet by mouth daily 30 tablet 0     Allergies   Allergen Reactions    Phenergan [Promethazine Hcl] Other (See Comments)     \"everything happens, I get anxious\"       Nursing Notes Reviewed    Physical Exam:  ED Triage Vitals   Enc Vitals Group      BP 04/13/19 0709 (!) 153/114      Pulse 04/13/19 0707 93      Resp 04/13/19 0707 16      Temp 04/13/19 0707 97.6 °F (36.4 °C)      Temp Source 04/13/19 0707 Oral      SpO2 04/13/19 0707 96 %      Weight 04/13/19 0707 170 lb (77.1 kg)      Height 04/13/19 0707 5' (1.524 m)      Head Circumference --       Peak Flow --       Pain Score --       Pain Loc --       Pain Edu? --       Excl. in [address]? --         My pulse ox interpretation is - normal    General appearance:  No acute distress. Skin:  Warm. Dry. No rash. Eye:  Extraocular movements intact. Ears, nose, mouth and throat:  Oral mucosa moist   Neck:  Trachea midline. Extremity:  No swelling. Normal ROM     Heart:  Regular rate and rhythm, normal S1 & S2, no extra heart sounds. Perfusion:  intact   Respiratory:  Lungs clear to auscultation bilaterally. Respirations nonlabored. Abdominal:  Normal bowel sounds. Soft. Nontender. Non distended. Back: no midline C/T/L/S tenderness or stepoffs. Neurological:      Mental Status Exam:   Alert and oriented times three, follows commands, speech and language intact    Cranial Ivlqln-ME-LPD Intact. Cranial nerve II  Visual acuity: normal  Cranial nerve III  Pupils: equal, round, reactive to light  Cranial nerves III, IV, VI  Extraocular Movements: intact  Cranial nerve V  Facial sensation: intact  Cranial nerve VII  Facial strength: intact  Cranial nerve VIII  Hearing: intact  Cranial nerve IX  Palate: intact  Cranial nerve XI  Shoulder shrug: intact  Cranial nerve XII  Tongue movement: normal    Motor:   Drift: absent. With standing and rhomberg there is no truncal ataxia. Motor exam is symmetrical 5 out of 5 all extremities bilaterally  Tone: normal, no clonus. Abnormal Movements: Absent    DTRs- intact bilaterally and symmetrical.   Toes-downgoing bilaterally.   Sensory:  Light Touch  Right ST elevation. No prevous to copmare. Chart review shows recent radiographs:  No results found. MDM:  41 red female history as above presents with complaints of double vision, and written. Seizures, most recently in the bilateral thighs and feet. On my exam her NIH is 0, some subjective difference in sensation at the proximal thighs and feet compared to the rest of the legs, but intact to light touch. She is mildly hypertensive, does have a history of hypertension, otherwise vitals are stable. Head CT and basic metabolic workup were initiated. With the family history of MS and her waxing and waning symptoms over months with now vision symptoms I am concerned for possible MS or demyelinating disease. Less likely stroke, she does have an NIH of 0 and symptoms have been ongoing for a couple of days, she is not a stroke alert. Her CT head is normal.  Her white count is mildly low, unexplained at this time, her labs otherwise appear to be very stable and normal, no evidence of any infection anywhere. Her pregnancy test is negative. Given acute worsening of symptoms over the last 2 days with 2 falls yesterday, family history of MS and now vision complaint we will plan for admission to get MRI and be evaluated by neurology as she has not been able to obtain appropriate follow-up as an outpatient. She is comfortable with plan. Discussed with hospitalist team    Clinical Impression:  1. Double vision    2. Paresthesia      Disposition referral (if applicable):  No follow-up provider specified. Disposition medications (if applicable):  New Prescriptions    No medications on file       Comment: Please note this report has been produced using speech recognition software and may contain errors related to that system including errors in grammar, punctuation, and spelling, as well as words and phrases that may be inappropriate.  If there are any questions or concerns please feel free to contact the dictating provider for clarification.        Abhinav Tabares MD  04/13/19 2709

## 2019-04-13 NOTE — PROGRESS NOTES
MRI C-spine showed Cord flattening and borderline compression at C4-C5 and C5-C6 due to facet and osseous hypertrophy.  AP dimension of the spinal canal measures 7 mm at midline. Contacted the neurosurgeon, Dr. Rajiv Berry at [facility]. I explained to her that she does not have upper ext symptoms and her sensation and motor fundction on lower ext is intact. Without the neurological symptoms on upper extremities, or incontinent, or abnormal neurological finding, she is not indicated for urgent transfer and outpatient evaluation is reasonable at this time. Will continue to monitor symptoms at this time. MRI brain, C-spine were negative for degenerative changes suggesting MS.

## 2019-04-14 VITALS
OXYGEN SATURATION: 100 % | HEIGHT: 60 IN | TEMPERATURE: 96.9 F | BODY MASS INDEX: 40.44 KG/M2 | RESPIRATION RATE: 16 BRPM | HEART RATE: 64 BPM | DIASTOLIC BLOOD PRESSURE: 85 MMHG | SYSTOLIC BLOOD PRESSURE: 136 MMHG | WEIGHT: 206 LBS

## 2019-04-14 LAB
HCT VFR BLD CALC: 40.1 % (ref 37–47)
HEMOGLOBIN: 12.9 GM/DL (ref 12.5–16)
MCH RBC QN AUTO: 28.8 PG (ref 27–31)
MCHC RBC AUTO-ENTMCNC: 32.2 % (ref 32–36)
MCV RBC AUTO: 89.5 FL (ref 78–100)
PDW BLD-RTO: 12.8 % (ref 11.7–14.9)
PLATELET # BLD: 129 K/CU MM (ref 140–440)
PMV BLD AUTO: 10.4 FL (ref 7.5–11.1)
RBC # BLD: 4.48 M/CU MM (ref 4.2–5.4)
WBC # BLD: 3 K/CU MM (ref 4–10.5)

## 2019-04-14 PROCEDURE — 96374 THER/PROPH/DIAG INJ IV PUSH: CPT

## 2019-04-14 PROCEDURE — 96372 THER/PROPH/DIAG INJ SC/IM: CPT

## 2019-04-14 PROCEDURE — 6360000002 HC RX W HCPCS: Performed by: HOSPITALIST

## 2019-04-14 PROCEDURE — 85027 COMPLETE CBC AUTOMATED: CPT

## 2019-04-14 PROCEDURE — G0378 HOSPITAL OBSERVATION PER HR: HCPCS

## 2019-04-14 PROCEDURE — 6370000000 HC RX 637 (ALT 250 FOR IP): Performed by: HOSPITALIST

## 2019-04-14 PROCEDURE — 99222 1ST HOSP IP/OBS MODERATE 55: CPT | Performed by: NURSE PRACTITIONER

## 2019-04-14 PROCEDURE — 2580000003 HC RX 258: Performed by: HOSPITALIST

## 2019-04-14 PROCEDURE — 36415 COLL VENOUS BLD VENIPUNCTURE: CPT

## 2019-04-14 RX ORDER — DEXAMETHASONE SODIUM PHOSPHATE 4 MG/ML
10 INJECTION, SOLUTION INTRA-ARTICULAR; INTRALESIONAL; INTRAMUSCULAR; INTRAVENOUS; SOFT TISSUE ONCE
Status: COMPLETED | OUTPATIENT
Start: 2019-04-14 | End: 2019-04-14

## 2019-04-14 RX ADMIN — METHOCARBAMOL TABLETS 500 MG: 500 TABLET, COATED ORAL at 12:28

## 2019-04-14 RX ADMIN — NAPROXEN 500 MG: 250 TABLET ORAL at 16:08

## 2019-04-14 RX ADMIN — DEXAMETHASONE SODIUM PHOSPHATE 10 MG: 4 INJECTION, SOLUTION INTRAMUSCULAR; INTRAVENOUS at 13:50

## 2019-04-14 RX ADMIN — NAPROXEN 500 MG: 250 TABLET ORAL at 03:29

## 2019-04-14 RX ADMIN — SODIUM CHLORIDE, PRESERVATIVE FREE 10 ML: 5 INJECTION INTRAVENOUS at 08:18

## 2019-04-14 RX ADMIN — ENOXAPARIN SODIUM 40 MG: 40 INJECTION SUBCUTANEOUS at 08:18

## 2019-04-14 RX ADMIN — METHOCARBAMOL TABLETS 500 MG: 500 TABLET, COATED ORAL at 08:17

## 2019-04-14 RX ADMIN — METHOCARBAMOL TABLETS 500 MG: 500 TABLET, COATED ORAL at 16:08

## 2019-04-14 RX ADMIN — METOPROLOL SUCCINATE 25 MG: 25 TABLET, EXTENDED RELEASE ORAL at 08:17

## 2019-04-14 ASSESSMENT — PAIN SCALES - GENERAL
PAINLEVEL_OUTOF10: 7
PAINLEVEL_OUTOF10: 0
PAINLEVEL_OUTOF10: 0
PAINLEVEL_OUTOF10: 4

## 2019-04-14 NOTE — PROGRESS NOTES
Discharge paperwork discussed and understood with pt. Medication called into [facility]. Pt escorted off unit by in-laws.

## 2019-04-14 NOTE — PLAN OF CARE
Problem: Falls - Risk of:  Goal: Will remain free from falls  Description  Will remain free from falls  Outcome: Ongoing  Goal: Absence of physical injury  Description  Absence of physical injury  Outcome: Ongoing     Problem: HEMODYNAMIC STATUS  Goal: Patient has stable vital signs and fluid balance  Outcome: Ongoing     Problem: ACTIVITY INTOLERANCE/IMPAIRED MOBILITY  Goal: Mobility/activity is maintained at optimum level for patient  Outcome: Ongoing     Problem: COMMUNICATION IMPAIRMENT  Goal: Ability to express needs and understand communication  Outcome: Ongoing

## 2019-04-14 NOTE — PLAN OF CARE
Problem: Falls - Risk of:  Goal: Will remain free from falls  Description  Will remain free from falls  4/14/2019 1200 by Nayely Gutierrez RN  Outcome: Ongoing  4/14/2019 0415 by Loida Bolden  Outcome: Ongoing  Goal: Absence of physical injury  Description  Absence of physical injury  4/14/2019 1200 by Nayely Gutierrez RN  Outcome: Ongoing  4/14/2019 0415 by Loida Bolden  Outcome: Ongoing     Problem: HEMODYNAMIC STATUS  Goal: Patient has stable vital signs and fluid balance  4/14/2019 1200 by Nayely Gutierrez RN  Outcome: Ongoing  4/14/2019 0415 by Loida Bolden  Outcome: Ongoing     Problem: ACTIVITY INTOLERANCE/IMPAIRED MOBILITY  Goal: Mobility/activity is maintained at optimum level for patient  4/14/2019 1200 by Nayely Gutierrez RN  Outcome: Ongoing  4/14/2019 0415 by Loida Bolden  Outcome: Ongoing     Problem: COMMUNICATION IMPAIRMENT  Goal: Ability to express needs and understand communication  4/14/2019 1200 by Nayely Gutierrez RN  Outcome: Ongoing  4/14/2019 0415 by Loida Bolden  Outcome: Ongoing

## 2019-04-14 NOTE — DISCHARGE SUMMARY
Discharge Summary    Name: Herminia Andre  :  1980   MRN:  [id number]    Primary Care Doctor:  No primary care provider on file. Admit date:  2019    Discharge date:  2019    Admitting Physician: Duane Lock, MD   Discharge Physician: Duane Lock MD    Reason for admission:  Below copied from H&P and no change required. \" The patient is a 41 y.o. female with history of hypertension who presented to ED with intermittent bilateral feet tingling and acute Bilateral blurry vision. The patient has had intermittent  Bilateral feet  Tingling  That radiates up to her legs for the past 8 months. She has been seen by her  Primary care provider, being considered for MS that her neurological workup has been incomplete, has not seen a neurologist nor got MRI. Velora Sis She developed  Bilateral blurry vision in the past several days which has been continuous. She has mild headache  Diffusely  In her head with some mild nausea without vomiting. She has no known history of migraine. She does have relatives who was diagnosed as MS according to the patient. She currently denies any weakness of extremities  And no problem with speech  At this time. \"    Diagnosis / Hospital Course: The medical problems addressed during this hospitalization include following. #. Bilateral blurry vision, transient, unclear etiology  #. Subjective bilateral feet paresthesia  #. Suspected migraine. #. Cervical spinal cord borderline compression at C4-5 and C5-6 on MRI    She underwent MRI of brain and C-spine. MRI brain was normal.  C-spine MRI showed she has cervical spine cord compression at C4-5 and C5-6 level. At this time, she does not have apparent neurological abnormality on exam, and her muscle strength was intact. She was able to ambulate. Her symptoms were stable, improved vision symptoms. She will be followed by neurosurgery as outpatient.   Neurology was consulted in hospital.  She was given decadron for potential mild cord compression but she refused depacon for poss migraine. Physical Examination upon discharge:   Pt was personally examined by me on the day of discharge with the following findings:  /85   Pulse 64   Temp 96.9 °F (36.1 °C) (Oral)   Resp 16   Ht 5' (1.524 m)   Wt 206 lb (93.4 kg)   SpO2 100%   BMI 40.23 kg/m²   General: The patient appears as stated age. Well appearing, and in no distress. Mental status: Alert, Oriented x3. Coherent. No agitation. Eyes: JOSE. Normal conjunctiva. ENT/Mouth: normal appearing jaw and neck, no neck nodes or sinus tenderness. Clear oropharynx with moist mucous membrane. Cardiovascular:  normal rate, regular rhythm, normal S1, S2, no murmurs, rubs, clicks or gallops. No peripheral edema. Dorsal pedis pulses 2+ bilaterally. Respiratory: clear to auscultation, no wheezes, rales or rhonchi, symmetric air entry. Gastrointestinal: soft, nontender, nondistended, no masses or organomegaly. Genitourinary:  No CVA tenderness. Musculoskletal:  no clubbing or cyanosis. No joint swelling, warmth, or tenderness. Skin:  normal coloration and turgor, no rashes, no suspicious skin lesions noted. Neurology:  CN exams were normal.  Muscle strength was 5 out of 5 in all ext. DTR was 1+ throughout. Sensory was intact to light touch diffusely. Pain sensation was intact throuout.       Condition at the time of discharge:  Stable  Disposition: home  Discharge FOLLOW UP  Follow-up With  Details  Why  Contact Naina Parada DO  Schedule an appointment as soon as possible for a visit    Simon Chin [address] [place] [address]  Schedule an appointment as soon as possible for a visit in 1 week  PCP follow up  Lisa Espinoza [facility]. [phone]       Discharge Medications:       Lisa Avery   Home Medication Instructions [facility]:[id number]    Printed on:04/14/19 9757   Medication Information                      dextran 70-hypromellose (ARTIFICIAL TEARS) 0.1-0.3 % SOLN opthalmic solution  Place 1 drop into both eyes every 4 hours as needed (eye irritation)             methocarbamol (ROBAXIN) 500 MG tablet  Take 1 tablet by mouth 4 times daily As needed for muscle spasm. metoprolol succinate (TOPROL XL) 25 MG extended release tablet  Take 1 tablet by mouth daily             naproxen (NAPROSYN) 500 MG tablet  Take 1 tablet by mouth 2 times daily as needed for Pain                 Consults:  IP CONSULT TO HOSPITALIST  IP CONSULT TO NEUROLOGY    Significant Procedures:  none    Significant Diagnostic Studies:   Lab Results   Component Value Date    WBC 3.0 (L) 04/14/2019    HGB 12.9 04/14/2019    HCT 40.1 04/14/2019    MCV 89.5 04/14/2019     (L) 04/14/2019     Lab Results   Component Value Date     04/13/2019    K 3.8 04/13/2019     04/13/2019    CO2 22 04/13/2019    BUN 11 04/13/2019    CREATININE 0.8 04/13/2019    GLUCOSE 106 (H) 04/13/2019    CALCIUM 8.9 04/13/2019    PROT 7.7 04/13/2019    LABALBU 4.0 04/13/2019    BILITOT 0.7 04/13/2019    ALKPHOS 99 04/13/2019    AST 41 (H) 04/13/2019    ALT 33 04/13/2019    LABGLOM >60 04/13/2019    GFRAA >60 04/13/2019       Ct Head Wo Contrast    Result Date: 4/13/2019  EXAMINATION: CT OF THE HEAD WITHOUT CONTRAST  4/13/2019 7:28 am TECHNIQUE: CT of the head was performed without the administration of intravenous contrast. Dose modulation, iterative reconstruction, and/or weight based adjustment of the mA/kV was utilized to reduce the radiation dose to as low as reasonably achievable. COMPARISON: March 13, 2019 HISTORY: ORDERING SYSTEM PROVIDED HISTORY: blurred vision/double vision, intermittent parasthesias TECHNOLOGIST PROVIDED HISTORY: CVA Has a \"code stroke\" or \"stroke alert\" been called? ->No Ordering Physician Provided Reason for Exam: blurred vision/double vision, intermittent parasthesias Acuity: Acute Type of Exam: Initial Additional signs and symptoms: none Relevant Medical/Surgical History: none FINDINGS: BRAIN/VENTRICLES: There is no acute intracranial hemorrhage, mass effect or midline shift. No abnormal extra-axial fluid collection. The gray-white differentiation is maintained without evidence of an acute infarct. There is no evidence of hydrocephalus. ORBITS: The visualized portion of the orbits demonstrate no acute abnormality. SINUSES: The visualized paranasal sinuses and mastoid air cells demonstrate no acute abnormality. SOFT TISSUES/SKULL:  No acute abnormality of the visualized skull or soft tissues. No acute intracranial abnormality. Mri Cervical Spine W Wo Contrast    Result Date: 4/13/2019  EXAMINATION: MRI OF THE CERVICAL SPINE WITHOUT AND WITH CONTRAST,  4/13/2019 1:07 pm: TECHNIQUE: Multiplanar multisequence MRI of the cervical spine was performed without and with the administration of intravenous contrast. COMPARISON: None HISTORY: ORDERING SYSTEM PROVIDED HISTORY: Demyelinating disease with spinal cord syx TECHNOLOGIST PROVIDED HISTORY: Ordering Physician Provided Reason for Exam: c/o visual disturbance and ha's for past 4 days; with left side weakness; nkt, no sx to either chase per pt. 17 mL ProHance     JG FINDINGS: BONES/ALIGNMENT: Generalized straightening of the cervical spine. Marrow signal is unremarkable. Cervical vertebral bodies appear normal in height. SPINAL CORD: No convincing cervical cord signal abnormality. SOFT TISSUES: No paraspinal mass identified. Incomplete postcontrast sagittal imaging sequence. No evidence of abnormal enhancement on postcontrast axial imaging. C2-C3: There is no significant disc protrusion, spinal canal stenosis or neural foraminal narrowing. C3-C4: Small central disc protrusion. Partial anterior subarachnoid effacement. Mild spinal canal stenosis. No significant neural foraminal narrowing. C4-C5: Moderate right and mild left neural foraminal narrowing due to facet and uncovertebral hypertrophy. Diffuse disc bulging and endplate spurring. Complete anterior and near complete posterior subarachnoid effacement. Cord flattening. AP dimension of the spinal canal measures 7 mm at midline. C5-C6: Moderate-severe bilateral neural foraminal narrowing due to facet and uncovertebral hypertrophy. Diffuse disc bulging and endplate spurring. Near complete anterior and posterior subarachnoid effacement. Cord flattening. AP dimension of the spinal canal measures 7 mm at midline. C6-C7: Small central disc protrusion. Partial anterior subarachnoid effacement. No significant neural foraminal narrowing. C7-T1: There is no significant disc protrusion, spinal canal stenosis or neural foraminal narrowing. Cord flattening and borderline compression at C4-C5 and C5-C6. AP dimension of the spinal canal measures 7 mm at midline. No definite cord signal abnormality to suggest demyelinating disease. Multilevel neural foraminal narrowing. Xr Chest Portable    Result Date: 4/13/2019  EXAMINATION: SINGLE XRAY VIEW OF THE CHEST 4/13/2019 7:17 am COMPARISON: 01/09/2019 HISTORY: ORDERING SYSTEM PROVIDED HISTORY: CVA TECHNOLOGIST PROVIDED HISTORY: Reason for exam:->CVA Ordering Physician Provided Reason for Exam: CVA Acuity: Acute Type of Exam: Initial Additional signs and symptoms: Reports blurred vision and tingling in bilat feet. Reports hx of ms. FINDINGS: No confluent airspace disease. No pleural effusion or pneumothorax. Cardiac and mediastinal silhouettes are unchanged. No evidence of acute cardiopulmonary disease.      Mri Brain W Wo Contrast    Result Date: 4/13/2019  EXAMINATION: MRI OF THE BRAIN WITHOUT AND WITH CONTRAST  4/13/2019 1:15 pm TECHNIQUE: Multiplanar multisequence MRI of the head/brain was performed without and with the administration of intravenous contrast. COMPARISON: MRI brain August 30, 2016 HISTORY: ORDERING SYSTEM PROVIDED HISTORY: ABNORMAL GAIT (ATAXIA) TECHNOLOGIST PROVIDED HISTORY: Ordering Physician Provided Reason for Exam: c/o visual disturbance and ha's for past 4 days; with left side weakness; nkt, no sx to either chase per pt. 17ML Prohance     JG Acuity: Unknown Type of Exam: Unknown FINDINGS: Limited by motion artifact. INTRACRANIAL STRUCTURES/VENTRICLES:  There is no acute infarct. No mass effect or midline shift. No evidence of an acute intracranial hemorrhage. The ventricles and sulci are normal in size and configuration. The sellar/suprasellar regions appear unremarkable. The normal signal voids within the major intracranial vessels appear maintained. There is crowding of the foramen magnum. No abnormal focus of enhancement is seen within the brain. ORBITS: The visualized portion of the orbits demonstrate no acute abnormality. SINUSES: There is scattered minimal mucosal thickening in the paranasal sinuses. The bilateral mastoid air cells are clear. BONES/SOFT TISSUES: The bone marrow signal intensity appears normal. The soft tissues demonstrate no acute abnormality. No acute intracranial abnormality. No mass effect or abnormal intracranial enhancement. Time Spent on discharge is 40 minutes discussing plan of care and discharge medications with patient and nursing staff. Please send a copy of this discharge summary to No primary care provider on file.  and all consultants above    Electronically signed by Huey Waller MD on 4/14/2019 at 5:20 PM

## 2019-04-15 NOTE — CONSULTS
Neurology Service Consult Note  [facility]  Patient Name: Mary Hartman  : 1980        Subjective:   Reason for consult: \"I am weak all over with numbness and tingling\"  41 y.o. right-handed female with PMH of anxiety, HA, HTN, migraine and UTI presenting to [facility] with complaints of left facial numbness and associated extremity weakness specifically in the BUE with numbness and tingling, patient report she has a family history of MS and is concerned this could be MS. He has no pain in the eyes, or abnormal eye movement or vision loss. Her headache was generalized and throbbing with photosensitivity and nausea that has resolved on my exam today. Her arms and hands are tingling but not weak today on exam. She denies BLE weakness or sensation changes on exam today. She does appreciate moderate neck pain that is sore and achy. She denies recent falls, no head injuries, no seizure, no unilateral arm or leg weakness, no unilateral facial droop. Denies CP and SOB. We reviewed her MRIs and I have reached out to neurosurgery for an opinion, we discuss follow up, I called neurosurgery office myself and they are going to reach out to her. Past Medical History:   Diagnosis Date    Anxiety     Headache(784.0)     Hypertension     Migraine     UTI (urinary tract infection)     :   Past Surgical History:   Procedure Laterality Date    TUBAL LIGATION       Medications:  Scheduled Meds:  Continuous Infusions:  PRN Meds:.     Allergies   Allergen Reactions    Phenergan [Promethazine Hcl] Other (See Comments)     \"everything happens, I get anxious\"     Social History     Socioeconomic History    Marital status:      Spouse name: Not on file    Number of children: Not on file    Years of education: Not on file    Highest education level: Not on file   Occupational History    Not on file   Social Needs    Financial resource strain: Not on file    Food insecurity:     Worry: Not on file     Inability: Not on file   Accumetrics needs:     Medical: Not on file     Non-medical: Not on file   Tobacco Use    Smoking status: Never Smoker    Smokeless tobacco: Never Used   Substance and Sexual Activity    Alcohol use: Yes     Comment: occasionally     Drug use: No    Sexual activity: Not on file   Lifestyle    Physical activity:     Days per week: Not on file     Minutes per session: Not on file    Stress: Not on file   Relationships    Social connections:     Talks on phone: Not on file     Gets together: Not on file     Attends Hindu service: Not on file     Active member of club or organization: Not on file     Attends meetings of clubs or organizations: Not on file     Relationship status: Not on file    Intimate partner violence:     Fear of current or ex partner: Not on file     Emotionally abused: Not on file     Physically abused: Not on file     Forced sexual activity: Not on file   Other Topics Concern    Not on file   Social History Narrative    Not on file      History reviewed. No pertinent family history. Review of Symptoms:    10-point system review completed. All of which are negative except as mentioned above. Physical Exam:      Gen: A&O x 4, NAD, cooperative  HEENT: NC/AT, EOMI, PERRL, mmm, no carotid bruits, neck supple, no meningeal signs; Heart: Regular   Lungs: no distress  Ext: no edema, no calf tenderness b/l  Psych: normal mood and affect  Skin: no rashes or lesions    NEUROLOGIC EXAM:    Mental Status: A&O to self, location, month and year, NAD, speech clear, language fluent, repetition and naming intact, follows commands appropriately    Cranial Nerve Exam:   CN II-XII:  PERRL, VFF, no nystagmus, no gaze paresis, sensation V1-V3 intact b/l, muscles of facial expression symmetric; hearing intact to conversational tone, palate elevates symmetrically, shoulder elevation symmetric and tongue protrudes midline with movement side to side.     Motor Exam:       Strength 5/5 UE's/LE's b/l  Tone and bulk normal   No pronator drift    Deep Tendon Reflexes: 2/4 biceps, triceps, brachioradialis, patellar, and achilles b/l; flexor plantar responses b/l    Sensation: Intact light touch/temp UE's/LE's b/l    Coordination/Cerebellum:       Tremors--none      Rapidly alternating movements: no dysdiadochokinesia b/l                  Finger-to-Nose: no dysmetria b/l    Gait and stance:      Gait: deferred      LABS:     Recent Labs     04/13/19  0729 04/14/19  0545   WBC 2.3* 3.0*     --    K 3.8  --      --    CO2 22  --    BUN 11  --    CREATININE 0.8  --    GLUCOSE 106*  --    INR 1.16  --        IMAGING:    MRI C spine:  Cord flattening and borderline compression at C4-C5 and C5-C6.  AP dimension   of the spinal canal measures 7 mm at midline.       No definite cord signal abnormality to suggest demyelinating disease.       Multilevel neural foraminal narrowing. MRI brain:  No acute intracranial abnormality.  No mass effect or abnormal intracranial   enhancement. ASSESSMENT/PLAN:     3 41year old female with left facial numbness and BUE tingling with BLE weakness secondary to atypical migraine with acute radiculopathy and myelopathic findings on MRI C spine. Brain is non-acute. No suspicion for demyelinating disease. Patient to call and have appt set up with neurosurgery on an outpatient basis. No further recommendations. Stable for discharge, thank you. Thank you for allowing us to participate in the care of your patient. If there are any questions regarding evaluation please feel free to contact us. Jackqueline Gosselin, APRN - CNP, 4/15/2019    Attending Note:  I have rounded on this patient with Olivia Jasmine CNP. I have reviewed the chart and we have discussed this case in detail. The patient was seen and examined by myself. Pertinent labs and imaging have been personally reviewed. Our findings and impressions were discussed with the patient.  I concur with the Nurse Practioner's assessment and plan.     Darrion Paige DO 5/16/2019 8:38 PM

## 2019-04-18 LAB
EKG ATRIAL RATE: 81 BPM
EKG DIAGNOSIS: NORMAL
EKG P AXIS: 41 DEGREES
EKG P-R INTERVAL: 152 MS
EKG Q-T INTERVAL: 354 MS
EKG QRS DURATION: 84 MS
EKG QTC CALCULATION (BAZETT): 411 MS
EKG R AXIS: 19 DEGREES
EKG T AXIS: 28 DEGREES
EKG VENTRICULAR RATE: 81 BPM

## 2019-05-17 ENCOUNTER — APPOINTMENT (OUTPATIENT)
Dept: GENERAL RADIOLOGY | Age: 39
End: 2019-05-17
Payer: COMMERCIAL

## 2019-05-17 ENCOUNTER — HOSPITAL ENCOUNTER (EMERGENCY)
Age: 39
Discharge: HOME | End: 2019-05-17
Payer: COMMERCIAL

## 2019-05-17 VITALS
DIASTOLIC BLOOD PRESSURE: 101 MMHG | TEMPERATURE: 98 F | HEART RATE: 72 BPM | SYSTOLIC BLOOD PRESSURE: 141 MMHG | RESPIRATION RATE: 16 BRPM | HEIGHT: 60 IN | WEIGHT: 206 LBS | OXYGEN SATURATION: 98 % | BODY MASS INDEX: 40.44 KG/M2

## 2019-05-17 DIAGNOSIS — M25.552 ACUTE HIP PAIN, LEFT: ICD-10-CM

## 2019-05-17 DIAGNOSIS — S70.12XA CONTUSION OF LEFT THIGH, INITIAL ENCOUNTER: ICD-10-CM
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
## 2019-05-17 DIAGNOSIS — S50.12XA CONTUSION OF LEFT FOREARM, INITIAL ENCOUNTER: Primary | ICD-10-CM

## 2019-05-17 DIAGNOSIS — M25.532 ACUTE PAIN OF LEFT WRIST: ICD-10-CM

## 2019-05-17 PROCEDURE — 6370000000 HC RX 637 (ALT 250 FOR IP): Performed by: PHYSICIAN ASSISTANT

## 2019-05-17 PROCEDURE — 99283 EMERGENCY DEPT VISIT LOW MDM: CPT

## 2019-05-17 PROCEDURE — 73552 X-RAY EXAM OF FEMUR 2/>: CPT

## 2019-05-17 PROCEDURE — 72170 X-RAY EXAM OF PELVIS: CPT

## 2019-05-17 PROCEDURE — 73080 X-RAY EXAM OF ELBOW: CPT

## 2019-05-17 PROCEDURE — 73090 X-RAY EXAM OF FOREARM: CPT

## 2019-05-17 PROCEDURE — 73110 X-RAY EXAM OF WRIST: CPT

## 2019-05-17 RX ORDER — LIDOCAINE 4 G/G
1 PATCH TOPICAL ONCE
Status: DISCONTINUED | OUTPATIENT
Start: 2019-05-17 | End: 2019-05-17 | Stop reason: HOSPADM

## 2019-05-17 RX ORDER — NAPROXEN 500 MG/1
500 TABLET ORAL 2 TIMES DAILY PRN
Qty: 30 TABLET | Refills: 0 | Status: SHIPPED | OUTPATIENT
Start: 2019-05-17 | End: 2021-01-22 | Stop reason: SDUPTHER

## 2019-05-17 RX ORDER — HYDROCODONE BITARTRATE AND ACETAMINOPHEN 5; 325 MG/1; MG/1
1 TABLET ORAL ONCE
Status: COMPLETED | OUTPATIENT
Start: 2019-05-17 | End: 2019-05-17

## 2019-05-17 RX ORDER — LIDOCAINE 50 MG/G
1 PATCH TOPICAL DAILY
Qty: 10 PATCH | Refills: 0 | Status: SHIPPED | OUTPATIENT
Start: 2019-05-17 | End: 2022-10-07 | Stop reason: ALTCHOICE

## 2019-05-17 RX ADMIN — HYDROCODONE BITARTRATE AND ACETAMINOPHEN 1 TABLET: 5; 325 TABLET ORAL at 07:42

## 2019-05-17 ASSESSMENT — PAIN DESCRIPTION - LOCATION: LOCATION: WRIST;HIP

## 2019-05-17 ASSESSMENT — PAIN DESCRIPTION - FREQUENCY: FREQUENCY: CONTINUOUS

## 2019-05-17 ASSESSMENT — PAIN DESCRIPTION - PAIN TYPE: TYPE: ACUTE PAIN

## 2019-05-17 ASSESSMENT — PAIN DESCRIPTION - ORIENTATION: ORIENTATION: LEFT

## 2019-05-17 ASSESSMENT — PAIN SCALES - GENERAL
PAINLEVEL_OUTOF10: 9
PAINLEVEL_OUTOF10: 9

## 2019-05-17 NOTE — ED TRIAGE NOTES
Patient states was getting coffee at [facility] when she slipped and fell on the wet floor. Patient states now has left wrist and hip pain.

## 2019-05-17 NOTE — ED PROVIDER NOTES
eMERGENCY dEPARTMENT eNCOUnter      PCP: No primary care provider on file. CHIEF COMPLAINT    Chief Complaint   Patient presents with    Fall     L wrist and L hip pain       HPI    Abhilash Radford is a 41 y.o. female who presents with left wrist and left hip pain. Onset was just PTA. The context was patient slipped on water at a gas station this morning falling on her left hip and left forearm. Duration has been constant since the onset. Left wrist pain radiates throughout the left forearm. Patient has associated bruising of left thigh and left forearm. Quality of pain is described sharp. The pain severity is mass and. The patient has no associated other injuries. The pain is aggravated by movement. Pain is alleviated by resting. Patient is right-hand dominant. Patient denies hitting her head, loss of consciousness, symptoms preceding fall, dizziness, headache, lightheadedness, fever, chills, distal numbness, tingling, weakness, functional deficit. Patient denies other injuries. REVIEW OF SYSTEMS    General: Denies fever or chills  Cardiac: Denies chest pain  Pulmonary: Denies shortness of breath  GI: Denies abdominal pain, vomiting, or diarrhea  : No dysuria or hematuria  Neuro:  Denies numbness, tingling, weakness. Denies headache. Denies loss of consciousness. Musculoskeletal: See HPI; Denies any other musculoskeletal injuries, including chest wall and back.     All other review of systems are negative  See HPI and nursing notes for additional information       PAST MEDICAL & SURGICAL HISTORY    Past Medical History:   Diagnosis Date    Anxiety     Headache(784.0)     Hypertension     Migraine     UTI (urinary tract infection)      Past Surgical History:   Procedure Laterality Date    TUBAL LIGATION         CURRENT MEDICATIONS    Current Outpatient Rx   Medication Sig Dispense Refill    naproxen (NAPROSYN) 500 MG tablet Take 1 tablet by mouth 2 times daily as needed for Pain 30 tablet 0  lidocaine (LIDODERM) 5 % Place 1 patch onto the skin daily 12 hours on, 12 hours off. 10 patch 0    dextran 70-hypromellose (ARTIFICIAL TEARS) 0.1-0.3 % SOLN opthalmic solution Place 1 drop into both eyes every 4 hours as needed (eye irritation) 1 Bottle 2    methocarbamol (ROBAXIN) 500 MG tablet Take 1 tablet by mouth 4 times daily As needed for muscle spasm. 20 tablet 0    metoprolol succinate (TOPROL XL) 25 MG extended release tablet Take 1 tablet by mouth daily 30 tablet 0       ALLERGIES    Allergies   Allergen Reactions    Phenergan [Promethazine Hcl] Other (See Comments)     \"everything happens, I get anxious\"       SOCIAL & FAMILY HISTORY    Social History     Socioeconomic History    Marital status:      Spouse name: None    Number of children: None    Years of education: None    Highest education level: None   Occupational History    None   Social Needs    Financial resource strain: None    Food insecurity:     Worry: None     Inability: None    Transportation needs:     Medical: None     Non-medical: None   Tobacco Use    Smoking status: Never Smoker    Smokeless tobacco: Never Used   Substance and Sexual Activity    Alcohol use: Yes     Comment: occasionally     Drug use: No    Sexual activity: None   Lifestyle    Physical activity:     Days per week: None     Minutes per session: None    Stress: None   Relationships    Social connections:     Talks on phone: None     Gets together: None     Attends Congregational service: None     Active member of club or organization: None     Attends meetings of clubs or organizations: None     Relationship status: None    Intimate partner violence:     Fear of current or ex partner: None     Emotionally abused: None     Physically abused: None     Forced sexual activity: None   Other Topics Concern    None   Social History Narrative    None     History reviewed. No pertinent family history.         PHYSICAL EXAM    VITAL SIGNS: BP (!) 141/101   Pulse 72   Temp 98 °F (36.7 °C) (Oral)   Resp 16   Ht 5' (1.524 m)   Wt 206 lb (93.4 kg)   SpO2 98%   BMI 40.23 kg/m²   Constitutional:  Well developed, Appears comfortable  ENT:  Atraumatic. PERRL, EOMI. Musculoskeletal:    Left upper extremity: No gross deformity. + ecchymosis of left mid forearm on dorsal aspect. + Mild swelling of left wrist. There is tenderness to palpation of the mid to distal ulna diffusely, but no radius tenderness to palpation. Mild palpation of left wrist on ulnar side. No tenderness to palpation of the hand. No snuffbox tenderness to palpation. Full active range of motion of all joints of fingers of left hand and left wrist.     No gross deformity, swelling, discoloration or range of motion deficit of left elbow. No tenderness to palpation of left elbow. Left HIP exam:      -Inspection:   No gross deformities, swelling, discolorations. Patient spontaneously moves affect leg. No rash. - Palpation: + Tenderness over lateral aspect of hip. No anterior or posterior TTP. No redness. No warmth to palpation        -ROM:  Full AROM    Left thigh reveals no swelling or gross deformity. There is ecchymosis present of the left lateral proximal thigh with tenderness to palpation of this region. No other left thigh tenderness to palpation. No swelling, discoloration, or tenderness to palpation of the ipsilateral knee. Full AROM of ipsilateral knee. Remaining lower and upper extremity exam as well as back exam without abnormality. No spinal TTP. No paraspinal TTP. No palpable step-offs or deformities of spine. No cervical spine tenderness to palpation or pain with range of motion. Respiratory:  Lungs clear. Vascular: Distal pulses (DP, PT) intact on affected side. Integument:  Well hydrated, no rash   Neurologic:  Awake and alert, normal flow of speech. Cranial nerves II through XII intact. No obvious focal deficits.    Distal sensation, motor, capillary refill intact. Psychiatric: Cooperative, pleasant affect        RADIOLOGY/PROCEDURES    XR ELBOW LEFT (MIN 3 VIEWS)   Final Result   1. No acute osseous abnormality involving the left elbow. XR FEMUR LEFT (MIN 2 VIEWS)   Final Result   No acute osseous abnormality in the left femur are pelvis. Mild degenerative changes the left hip. XR WRIST LEFT (MIN 3 VIEWS)   Final Result   1. Small flake of bone adjacent to the coronoid process and radial head at   the left elbow could represent an acute avulsion fracture in the right   clinical setting. Suggest dedicated left elbow films for better   characterization. 2. No additional acute fracture or dislocation of the left forearm or left   wrist.         XR RADIUS ULNA LEFT (2 VIEWS)   Final Result   1. Small flake of bone adjacent to the coronoid process and radial head at   the left elbow could represent an acute avulsion fracture in the right   clinical setting. Suggest dedicated left elbow films for better   characterization. 2. No additional acute fracture or dislocation of the left forearm or left   wrist.         XR PELVIS (1-2 VIEWS)   Final Result   No acute osseous abnormality in the left femur are pelvis. Mild degenerative changes the left hip. PROCEDURES   SPLINT PLACEMENT     A velcro wrist splint was applied to left upper extremity by the emergency department nurse. The splint is in good position. The patient's extremity is neurovascularly intact after the splint placement. ED COURSE & MEDICAL DECISION MAKING       Vital signs and nursing notes reviewed during ED course. I have independently evaluated this patient. Supervising physician present in the Emergency Department, available for consultation, throughout entirety of  patient care. Patient presents as above with injuries after mechanical fall.  History and exam consistent with musculoskeletal pain of left thigh/hip and left wrist pain and contusion precautions given. Clinical  IMPRESSION    1. Fall on same level from slipping, tripping or stumbling, initial encounter    2. Acute pain of left wrist    3. Contusion of left forearm, initial encounter    4. Contusion of left thigh, initial encounter    5. Acute hip pain, left        Disposition referral (if applicable):  [facility]  [address] [address] [facility]  [phone]  Call today  Recheck in 2-3 days    Elena John MD  [address] [place] [address]  [phone]    Call today  Establish primary care physician    Mingo Vazquez DO  [address] [facility]  [phone]    Call   Recheck in 1-2 weeks, As needed      Disposition medications (if applicable):  Discharge Medication List as of 5/17/2019  9:26 AM      START taking these medications    Details   lidocaine (LIDODERM) 5 % Place 1 patch onto the skin daily 12 hours on, 12 hours off., Disp-10 patch, R-0Print             Comment: Please note this report has been produced using speech recognition software and may contain errors related to that system including errors in grammar, punctuation, and spelling, as well as words and phrases that may be inappropriate. If there are any questions or concerns please feel free to contact the dictating provider for clarification.         Paulina Najera PA-C  05/17/19 7446

## 2019-10-14 ENCOUNTER — APPOINTMENT (OUTPATIENT)
Dept: GENERAL RADIOLOGY | Age: 39
End: 2019-10-14
Payer: COMMERCIAL

## 2019-10-14 ENCOUNTER — APPOINTMENT (OUTPATIENT)
Dept: CT IMAGING | Age: 39
End: 2019-10-14
Payer: COMMERCIAL

## 2019-10-14 ENCOUNTER — HOSPITAL ENCOUNTER (EMERGENCY)
Age: 39
Discharge: HOME | End: 2019-10-14
Payer: COMMERCIAL

## 2019-10-14 VITALS
TEMPERATURE: 98.1 F | HEIGHT: 60 IN | WEIGHT: 175 LBS | OXYGEN SATURATION: 99 % | RESPIRATION RATE: 15 BRPM | DIASTOLIC BLOOD PRESSURE: 96 MMHG | BODY MASS INDEX: 34.36 KG/M2 | HEART RATE: 87 BPM | SYSTOLIC BLOOD PRESSURE: 134 MMHG

## 2019-10-14 DIAGNOSIS — M54.2 NECK PAIN: ICD-10-CM

## 2019-10-14 DIAGNOSIS — S01.81XA FACIAL LACERATION, INITIAL ENCOUNTER: Primary | ICD-10-CM

## 2019-10-14 PROCEDURE — 70486 CT MAXILLOFACIAL W/O DYE: CPT

## 2019-10-14 PROCEDURE — 71046 X-RAY EXAM CHEST 2 VIEWS: CPT

## 2019-10-14 PROCEDURE — 73030 X-RAY EXAM OF SHOULDER: CPT

## 2019-10-14 PROCEDURE — 4500000027

## 2019-10-14 PROCEDURE — 72125 CT NECK SPINE W/O DYE: CPT

## 2019-10-14 PROCEDURE — 70450 CT HEAD/BRAIN W/O DYE: CPT

## 2019-10-14 PROCEDURE — 99284 EMERGENCY DEPT VISIT MOD MDM: CPT

## 2019-10-14 RX ORDER — LIDOCAINE HYDROCHLORIDE 20 MG/ML
5 INJECTION, SOLUTION EPIDURAL; INFILTRATION; INTRACAUDAL; PERINEURAL ONCE
Status: DISCONTINUED | OUTPATIENT
Start: 2019-10-14 | End: 2019-10-14 | Stop reason: HOSPADM

## 2019-10-14 ASSESSMENT — PAIN SCALES - GENERAL: PAINLEVEL_OUTOF10: 8

## 2019-10-14 ASSESSMENT — PAIN DESCRIPTION - PAIN TYPE: TYPE: ACUTE PAIN

## 2019-10-30 ENCOUNTER — HOSPITAL ENCOUNTER (EMERGENCY)
Age: 39
Discharge: HOME | End: 2019-10-30
Attending: EMERGENCY MEDICINE
Payer: COMMERCIAL

## 2019-10-30 VITALS
OXYGEN SATURATION: 100 % | HEART RATE: 106 BPM | BODY MASS INDEX: 34.36 KG/M2 | HEIGHT: 60 IN | TEMPERATURE: 98.1 F | RESPIRATION RATE: 16 BRPM | WEIGHT: 175 LBS

## 2019-10-30 DIAGNOSIS — N89.8 VAGINAL DISCHARGE: Primary | ICD-10-CM

## 2019-10-30 LAB
BACTERIA: ABNORMAL /HPF
BILIRUBIN URINE: NEGATIVE MG/DL
BLOOD, URINE: ABNORMAL
CLARITY: ABNORMAL
COLOR: ABNORMAL
GLUCOSE, URINE: NEGATIVE MG/DL
INTERPRETATION: NORMAL
KETONES, URINE: ABNORMAL MG/DL
LEUKOCYTE ESTERASE, URINE: ABNORMAL
MUCUS: ABNORMAL HPF
NITRITE URINE, QUANTITATIVE: NEGATIVE
PH, URINE: 5 (ref 5–8)
PREGNANCY, URINE: NEGATIVE
PROTEIN UA: 30 MG/DL
RBC URINE: 7 /HPF (ref 0–6)
SPECIFIC GRAVITY UA: 1.03 (ref 1–1.03)
SPECIFIC GRAVITY UA: ABNORMAL (ref 1–1.03)
SPECIFIC GRAVITY, URINE: 1.03 (ref 1–1.03)
SQUAMOUS EPITHELIAL: 18 /HPF
TRICHOMONAS: ABNORMAL /HPF
UROBILINOGEN, URINE: NORMAL MG/DL (ref 0.2–1)
WBC UA: 1 /HPF (ref 0–5)

## 2019-10-30 PROCEDURE — 87491 CHLMYD TRACH DNA AMP PROBE: CPT

## 2019-10-30 PROCEDURE — 81025 URINE PREGNANCY TEST: CPT

## 2019-10-30 PROCEDURE — 99283 EMERGENCY DEPT VISIT LOW MDM: CPT

## 2019-10-30 PROCEDURE — 87591 N.GONORRHOEAE DNA AMP PROB: CPT

## 2019-10-30 PROCEDURE — 81001 URINALYSIS AUTO W/SCOPE: CPT

## 2019-10-30 RX ORDER — NAPROXEN 250 MG/1
500 TABLET ORAL ONCE
Status: DISCONTINUED | OUTPATIENT
Start: 2019-10-30 | End: 2019-10-31 | Stop reason: HOSPADM

## 2019-10-30 ASSESSMENT — PAIN SCALES - GENERAL: PAINLEVEL_OUTOF10: 7

## 2019-10-30 ASSESSMENT — PAIN DESCRIPTION - LOCATION: LOCATION: ABDOMEN

## 2019-10-30 ASSESSMENT — PAIN DESCRIPTION - DESCRIPTORS: DESCRIPTORS: CRAMPING

## 2019-10-30 ASSESSMENT — PAIN DESCRIPTION - PAIN TYPE: TYPE: ACUTE PAIN

## 2019-11-02 LAB
CHLAMYDIA TRACHOMATIS AMPLIFIED DET: NEGATIVE
CHLAMYDIA TRACHOMATIS AMPLIFIED DET: NORMAL
N GONORRHOEAE AMPLIFIED DET: NEGATIVE
N GONORRHOEAE AMPLIFIED DET: NORMAL

## 2020-05-21 ENCOUNTER — HOSPITAL ENCOUNTER (EMERGENCY)
Age: 40
Discharge: HOME | End: 2020-05-21
Attending: EMERGENCY MEDICINE
Payer: COMMERCIAL

## 2020-05-21 ENCOUNTER — APPOINTMENT (OUTPATIENT)
Dept: CT IMAGING | Age: 40
End: 2020-05-21
Payer: COMMERCIAL

## 2020-05-21 VITALS
DIASTOLIC BLOOD PRESSURE: 67 MMHG | OXYGEN SATURATION: 100 % | SYSTOLIC BLOOD PRESSURE: 122 MMHG | HEART RATE: 70 BPM | BODY MASS INDEX: 33.77 KG/M2 | TEMPERATURE: 98 F | HEIGHT: 60 IN | RESPIRATION RATE: 17 BRPM | WEIGHT: 172 LBS

## 2020-05-21 PROCEDURE — 72125 CT NECK SPINE W/O DYE: CPT

## 2020-05-21 PROCEDURE — 99283 EMERGENCY DEPT VISIT LOW MDM: CPT

## 2020-05-21 RX ORDER — METHYLPREDNISOLONE 4 MG/1
TABLET ORAL
Qty: 1 KIT | Refills: 0 | Status: SHIPPED | OUTPATIENT
Start: 2020-05-21

## 2020-05-21 RX ORDER — ACETAMINOPHEN AND CODEINE PHOSPHATE 300; 30 MG/1; MG/1
1 TABLET ORAL EVERY 4 HOURS PRN
Qty: 18 TABLET | Refills: 0 | Status: SHIPPED | OUTPATIENT
Start: 2020-05-21 | End: 2020-05-24

## 2020-05-21 ASSESSMENT — PAIN SCALES - GENERAL
PAINLEVEL_OUTOF10: 0
PAINLEVEL_OUTOF10: 8

## 2020-05-21 ASSESSMENT — PAIN DESCRIPTION - PAIN TYPE: TYPE: ACUTE PAIN

## 2020-05-21 ASSESSMENT — ENCOUNTER SYMPTOMS
CONSTIPATION: 0
EYE REDNESS: 0
RHINORRHEA: 0
SHORTNESS OF BREATH: 0
NAUSEA: 0
BACK PAIN: 0
SORE THROAT: 0
DIARRHEA: 0
ABDOMINAL PAIN: 0
VOMITING: 0
COUGH: 0

## 2020-05-21 NOTE — ED PROVIDER NOTES
 Financial resource strain: Not on file   [place] insecurity     Worry: Not on file     Inability: Not on file   [employer] needs     Medical: Not on file     Non-medical: Not on file   Tobacco Use    Smoking status: Never Smoker    Smokeless tobacco: Never Used   Substance and Sexual Activity    Alcohol use: Yes     Comment: occasionally     Drug use: No    Sexual activity: Not on file   Lifestyle    Physical activity     Days per week: Not on file     Minutes per session: Not on file    Stress: Not on file   Relationships    Social connections     Talks on phone: Not on file     Gets together: Not on file     Attends Orthodox service: Not on file     Active member of club or organization: Not on file     Attends meetings of clubs or organizations: Not on file     Relationship status: Not on file    Intimate partner violence     Fear of current or ex partner: Not on file     Emotionally abused: Not on file     Physically abused: Not on file     Forced sexual activity: Not on file   Other Topics Concern    Not on file   Social History Narrative    Not on file     No current facility-administered medications for this encounter. Current Outpatient Medications   Medication Sig Dispense Refill    methylPREDNISolone (MEDROL, HARI,) 4 MG tablet Take by mouth. 1 kit 0    acetaminophen-codeine (TYLENOL/CODEINE #3) 300-30 MG per tablet Take 1 tablet by mouth every 4 hours as needed for Pain for up to 3 days. Intended supply: 3 days. Take lowest dose possible to manage pain 18 tablet 0    naproxen (NAPROSYN) 500 MG tablet Take 1 tablet by mouth 2 times daily as needed for Pain 30 tablet 0    lidocaine (LIDODERM) 5 % Place 1 patch onto the skin daily 12 hours on, 12 hours off.  10 patch 0    dextran 70-hypromellose (ARTIFICIAL TEARS) 0.1-0.3 % SOLN opthalmic solution Place 1 drop into both eyes every 4 hours as needed (eye irritation) 1 Bottle 2    methocarbamol (ROBAXIN) 500 MG tablet Take 1

## 2020-05-21 NOTE — ED NOTES
Pt provided pillow at this time per request. No requests other than that at this time.       Austyn Delgado RN  05/21/20 0111

## 2021-01-22 ENCOUNTER — APPOINTMENT (OUTPATIENT)
Dept: GENERAL RADIOLOGY | Age: 41
End: 2021-01-22
Payer: COMMERCIAL

## 2021-01-22 ENCOUNTER — HOSPITAL ENCOUNTER (EMERGENCY)
Age: 41
Discharge: HOME | End: 2021-01-22
Payer: COMMERCIAL

## 2021-01-22 VITALS
DIASTOLIC BLOOD PRESSURE: 87 MMHG | OXYGEN SATURATION: 100 % | BODY MASS INDEX: 39.27 KG/M2 | RESPIRATION RATE: 16 BRPM | HEART RATE: 92 BPM | HEIGHT: 60 IN | SYSTOLIC BLOOD PRESSURE: 153 MMHG | WEIGHT: 200 LBS | TEMPERATURE: 98.4 F

## 2021-01-22 DIAGNOSIS — S50.02XA CONTUSION OF LEFT ELBOW, INITIAL ENCOUNTER: Primary | ICD-10-CM

## 2021-01-22 PROCEDURE — 73080 X-RAY EXAM OF ELBOW: CPT

## 2021-01-22 PROCEDURE — 99283 EMERGENCY DEPT VISIT LOW MDM: CPT

## 2021-01-22 PROCEDURE — 73090 X-RAY EXAM OF FOREARM: CPT

## 2021-01-22 RX ORDER — NAPROXEN 500 MG/1
500 TABLET ORAL 2 TIMES DAILY PRN
Qty: 30 TABLET | Refills: 0 | OUTPATIENT
Start: 2021-01-22 | End: 2021-07-06

## 2021-01-22 ASSESSMENT — PAIN DESCRIPTION - PAIN TYPE: TYPE: ACUTE PAIN

## 2021-01-22 NOTE — ED PROVIDER NOTES
PCP: No primary care provider on file. CHIEF COMPLAINT    Chief Complaint   Patient presents with    Arm Injury     left elbow, arm injury       I evaluated this patient. My supervising physician was available for consultation. HPI    Yamilet Wills is a 47 y.o. female who presents with after a fall earlier this afternoon. She tripped on her shoes and fell onto her left elbow. Since then she has had a lot of pain in the elbow. Pain is worse with movement. No sensorimotor deficit and lower arm. Did not hit her head or lose consciousness. No distal numbness, tingling, weakness, or functional deficit. No other pain. REVIEW OF SYSTEMS    General: Denies constitutional symptoms. Cardiac: Denies chest wall injury or chest pain  Pulmonary: Denies chest wall injury or shortness of breath  GI: Denies abdomen injury or abdominal pain  Musculoskeletal:  Denies any other musculoskeletal pain or injuries, including chest wall and back. Skin: Small elbow abrasion  Lymphatics:  No nodules or streaks. See HPI and nursing notes for additional information     PAST MEDICAL & SURGICAL HISTORY    Past Medical History:   Diagnosis Date    Anxiety     Headache(784.0)     Hypertension     Migraine     UTI (urinary tract infection)      Past Surgical History:   Procedure Laterality Date    TUBAL LIGATION         CURRENT MEDICATIONS    Current Outpatient Rx   Medication Sig Dispense Refill    naproxen (NAPROSYN) 500 MG tablet Take 1 tablet by mouth 2 times daily as needed for Pain 30 tablet 0    methylPREDNISolone (MEDROL, HARI,) 4 MG tablet Take by mouth. 1 kit 0    lidocaine (LIDODERM) 5 % Place 1 patch onto the skin daily 12 hours on, 12 hours off.  10 patch 0    dextran 70-hypromellose (ARTIFICIAL TEARS) 0.1-0.3 % SOLN opthalmic solution Place 1 drop into both eyes every 4 hours as needed (eye irritation) 1 Bottle 2    methocarbamol (ROBAXIN) 500 MG tablet Take 1 tablet by mouth 4 times daily is painful but intact. Distal capillary refill, pulses, sensation and motor intact. Examination of remaining extremities reveals active ROM of  joints without ligamentous laxity. Theres no obvious joint or bony deformity. Lymphatics:  No swollen nodes or streaks. Integument:  Well hydrated, no rash. Vascular: affected extremity distally neurovascularly intact - pulses, sensation, and capillary refill intact. Neurologic:  Awake alert, normal flow of speech. Cranial nerves II through XII grossly intact. Psychiatric: Cooperative, pleasant affect    RADIOLOGY/PROCEDURES    XR RADIUS ULNA LEFT (2 VIEWS)   Final Result   No fracture visualized. XR ELBOW LEFT (MIN 3 VIEWS)   Final Result   No fracture visualized. ED COURSE & MEDICAL DECISION MAKING        Patient comes with injured left elbow after a fall. Physical exam is normal, no deformities, no neurovascular insult x-rays negative for fracture. Recommend orthopedic follow-up if no improvement next 5 to 7 days. Patient agrees to return emergency department if symptoms worsen or any new symptoms develop. Vital signs and nursing notes reviewed during ED course. All pertinent Lab data and radiographic results reviewed with patient at bedside. The patient and/or the family were informed of the results of any tests/labs/imaging, the treatment plan, and time was allotted to answer questions. Clinical  IMPRESSION    1. Contusion of left elbow, initial encounter              Comment: Please note this report has been produced using speech recognition software and may contain errors related to that system including errors in grammar, punctuation, and spelling, as well as words and phrases that may be inappropriate. If there are any questions or concerns please feel free to contact the dictating provider for clarification.         [place], [address]  01/22/21 4332

## 2021-01-22 NOTE — ED PROVIDER NOTES
As physician-in-triage, I performed a medical screening history and physical exam on this patient. HISTORY OF PRESENT ILLNESS  Barney Yusuf is a 47 y.o. female presents to the emergency department stating she had a trip and fall this morning. States she has pain in her left elbow into her left forearm. States she is some tingling in her fingers. Denies hitting her head or passing out. No neck or back pain. Kayli Dues PHYSICAL EXAM  BP (!) 153/87   Pulse 92   Temp 98.4 °F (36.9 °C)   Resp 16   Ht 5' (1.524 m)   Wt 200 lb (90.7 kg)   SpO2 100%   BMI 39.06 kg/m²     On exam, the patient appears in no acute distress. Speech is clear. Breathing is unlabored. Moves all extremities    Comment: Please note this report has been produced using speech recognition software and may contain errors related to that system including errors in grammar, punctuation, and spelling, as well as words and phrases that may be inappropriate. If there are any questions or concerns please feel free to contact the dictating provider for clarification.        Kim Segura MD  01/22/21 3442

## 2021-04-17 ENCOUNTER — HOSPITAL ENCOUNTER (EMERGENCY)
Age: 41
Discharge: HOME | End: 2021-04-17
Payer: COMMERCIAL

## 2021-04-17 ENCOUNTER — APPOINTMENT (OUTPATIENT)
Dept: GENERAL RADIOLOGY | Age: 41
End: 2021-04-17
Payer: COMMERCIAL

## 2021-04-17 VITALS
RESPIRATION RATE: 18 BRPM | HEIGHT: 60 IN | DIASTOLIC BLOOD PRESSURE: 75 MMHG | WEIGHT: 180 LBS | OXYGEN SATURATION: 100 % | HEART RATE: 88 BPM | BODY MASS INDEX: 35.34 KG/M2 | SYSTOLIC BLOOD PRESSURE: 121 MMHG | TEMPERATURE: 98.1 F

## 2021-04-17 DIAGNOSIS — R07.9 CHEST PAIN, UNSPECIFIED TYPE: Primary | ICD-10-CM

## 2021-04-17 LAB
ALBUMIN SERPL-MCNC: 3.6 GM/DL (ref 3.4–5)
ALP BLD-CCNC: 109 IU/L (ref 40–129)
ALT SERPL-CCNC: 25 U/L (ref 10–40)
ANION GAP SERPL CALCULATED.3IONS-SCNC: 9 MMOL/L (ref 4–16)
AST SERPL-CCNC: 51 IU/L (ref 15–37)
BACTERIA: ABNORMAL /HPF
BASOPHILS ABSOLUTE: 0.1 K/CU MM
BASOPHILS RELATIVE PERCENT: 0.7 % (ref 0–1)
BILIRUB SERPL-MCNC: 0.6 MG/DL (ref 0–1)
BILIRUBIN URINE: NEGATIVE MG/DL
BLOOD, URINE: NEGATIVE
BUN BLDV-MCNC: 16 MG/DL (ref 6–23)
CALCIUM SERPL-MCNC: 9 MG/DL (ref 8.3–10.6)
CHLORIDE BLD-SCNC: 100 MMOL/L (ref 99–110)
CLARITY: CLEAR
CO2: 23 MMOL/L (ref 21–32)
COLOR: COLORLESS
CREAT SERPL-MCNC: 0.9 MG/DL (ref 0.6–1.1)
DIFFERENTIAL TYPE: ABNORMAL
EOSINOPHILS ABSOLUTE: 0.2 K/CU MM
EOSINOPHILS RELATIVE PERCENT: 2.4 % (ref 0–3)
GFR AFRICAN AMERICAN: >60 ML/MIN/1.73M2
GFR NON-AFRICAN AMERICAN: >60 ML/MIN/1.73M2
GLUCOSE BLD-MCNC: 77 MG/DL (ref 70–99)
GLUCOSE, URINE: NEGATIVE MG/DL
HCG QUALITATIVE: NEGATIVE
HCT VFR BLD CALC: 39.2 % (ref 37–47)
HEMOGLOBIN: 13.4 GM/DL (ref 12.5–16)
IMMATURE NEUTROPHIL %: 0.1 % (ref 0–0.43)
KETONES, URINE: NEGATIVE MG/DL
LEUKOCYTE ESTERASE, URINE: ABNORMAL
LIPASE: 19 IU/L (ref 13–60)
LYMPHOCYTES ABSOLUTE: 2.4 K/CU MM
LYMPHOCYTES RELATIVE PERCENT: 29 % (ref 24–44)
MCH RBC QN AUTO: 29.6 PG (ref 27–31)
MCHC RBC AUTO-ENTMCNC: 34.2 % (ref 32–36)
MCV RBC AUTO: 86.7 FL (ref 78–100)
MONOCYTES ABSOLUTE: 0.6 K/CU MM
MONOCYTES RELATIVE PERCENT: 7 % (ref 0–4)
NITRITE URINE, QUANTITATIVE: NEGATIVE
NUCLEATED RBC %: 0 %
PDW BLD-RTO: 13.4 % (ref 11.7–14.9)
PH, URINE: 6 (ref 5–8)
PLATELET # BLD: 270 K/CU MM (ref 140–440)
PMV BLD AUTO: 10.2 FL (ref 7.5–11.1)
POTASSIUM SERPL-SCNC: 3.5 MMOL/L (ref 3.5–5.1)
PROTEIN UA: NEGATIVE MG/DL
RBC # BLD: 4.52 M/CU MM (ref 4.2–5.4)
RBC URINE: <1 /HPF (ref 0–6)
SEGMENTED NEUTROPHILS ABSOLUTE COUNT: 5.1 K/CU MM
SEGMENTED NEUTROPHILS RELATIVE PERCENT: 60.8 % (ref 36–66)
SODIUM BLD-SCNC: 132 MMOL/L (ref 135–145)
SPECIFIC GRAVITY UA: 1 (ref 1–1.03)
SQUAMOUS EPITHELIAL: 1 /HPF
TOTAL IMMATURE NEUTOROPHIL: 0.01 K/CU MM
TOTAL NUCLEATED RBC: 0 K/CU MM
TOTAL PROTEIN: 7.6 GM/DL (ref 6.4–8.2)
TRICHOMONAS: ABNORMAL /HPF
TROPONIN T: <0.01 NG/ML
UROBILINOGEN, URINE: NEGATIVE MG/DL (ref 0.2–1)
WBC # BLD: 8.4 K/CU MM (ref 4–10.5)
WBC UA: <1 /HPF (ref 0–5)

## 2021-04-17 PROCEDURE — 80053 COMPREHEN METABOLIC PANEL: CPT

## 2021-04-17 PROCEDURE — 84703 CHORIONIC GONADOTROPIN ASSAY: CPT

## 2021-04-17 PROCEDURE — 85025 COMPLETE CBC W/AUTO DIFF WBC: CPT

## 2021-04-17 PROCEDURE — 93005 ELECTROCARDIOGRAM TRACING: CPT | Performed by: PHYSICIAN ASSISTANT

## 2021-04-17 PROCEDURE — 99285 EMERGENCY DEPT VISIT HI MDM: CPT

## 2021-04-17 PROCEDURE — 71045 X-RAY EXAM CHEST 1 VIEW: CPT

## 2021-04-17 PROCEDURE — 36415 COLL VENOUS BLD VENIPUNCTURE: CPT

## 2021-04-17 PROCEDURE — 81001 URINALYSIS AUTO W/SCOPE: CPT

## 2021-04-17 PROCEDURE — 6370000000 HC RX 637 (ALT 250 FOR IP): Performed by: PHYSICIAN ASSISTANT

## 2021-04-17 PROCEDURE — 84484 ASSAY OF TROPONIN QUANT: CPT

## 2021-04-17 PROCEDURE — 83690 ASSAY OF LIPASE: CPT

## 2021-04-17 RX ORDER — LIDOCAINE HYDROCHLORIDE 20 MG/ML
15 SOLUTION OROPHARYNGEAL ONCE
Status: COMPLETED | OUTPATIENT
Start: 2021-04-17 | End: 2021-04-17

## 2021-04-17 RX ORDER — METHOCARBAMOL 500 MG/1
500 TABLET, FILM COATED ORAL 3 TIMES DAILY
Qty: 21 TABLET | Refills: 0 | Status: SHIPPED | OUTPATIENT
Start: 2021-04-17 | End: 2021-04-24

## 2021-04-17 RX ORDER — MAGNESIUM HYDROXIDE/ALUMINUM HYDROXICE/SIMETHICONE 120; 1200; 1200 MG/30ML; MG/30ML; MG/30ML
30 SUSPENSION ORAL ONCE
Status: COMPLETED | OUTPATIENT
Start: 2021-04-17 | End: 2021-04-17

## 2021-04-17 RX ORDER — IBUPROFEN 600 MG/1
600 TABLET ORAL EVERY 6 HOURS PRN
Qty: 20 TABLET | Refills: 0 | Status: SHIPPED | OUTPATIENT
Start: 2021-04-17 | End: 2021-07-06 | Stop reason: ALTCHOICE

## 2021-04-17 RX ORDER — HYDROCODONE BITARTRATE AND ACETAMINOPHEN 5; 325 MG/1; MG/1
1 TABLET ORAL ONCE
Status: DISCONTINUED | OUTPATIENT
Start: 2021-04-17 | End: 2021-04-17 | Stop reason: HOSPADM

## 2021-04-17 RX ADMIN — ALUMINUM HYDROXIDE, MAGNESIUM HYDROXIDE, AND SIMETHICONE 30 ML: 200; 200; 20 SUSPENSION ORAL at 14:27

## 2021-04-17 RX ADMIN — LIDOCAINE HYDROCHLORIDE 15 ML: 20 SOLUTION ORAL; TOPICAL at 14:28

## 2021-04-17 ASSESSMENT — HEART SCORE: ECG: 0

## 2021-04-17 ASSESSMENT — PAIN SCALES - GENERAL: PAINLEVEL_OUTOF10: 8

## 2021-04-17 ASSESSMENT — PAIN DESCRIPTION - LOCATION: LOCATION: CHEST

## 2021-04-17 NOTE — ED PROVIDER NOTES
EKG Interpretation    Interpreted by emergency department physician on April 17 at 1157    Rhythm: asystole  Rate: normal  Axis: normal  Ectopy: none  Conduction: normal  ST Segments: no acute change  T Waves: no acute change  Q Waves: none    Clinical Impression: Sinus rhythm with a rate of 82 and a QRS 82, QTc 420, developing possible left ventricular hypertrophy versus body habitus. No signs of ischemia or dysrhythmia.     Annalisa Roque MD  04/17/21 9075

## 2021-04-17 NOTE — ED PROVIDER NOTES
EMERGENCY DEPARTMENT ENCOUNTER        PCP: ALONSO Jordan CNP    CHIEF COMPLAINT    Chief Complaint   Patient presents with    Chest Pain     Of note, this patient was not evaluated by attending physician, attending physician was available for consultation    HPI    Vinayak Ritter is a 47 y.o. female who presents to the emergency department today with a chief complaint of left-sided chest pain, she states that it started last night while working. 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 No alleviating factors. She is having some left upper flank pain with frequency and urgency. Denies significant associated shortness of breath, indigestion, diaphoresis. No palpitations, no syncope or near syncope. Denies any leg swelling, dyspnea on exertion. No significant cardiac history. She is treated for hypertension. Denies having a significant cardiac work-up in the recent past.  No cough congestion fever no Covid contacts. No history of blood clots, no prolonged immune mobilization, no unilateral leg swelling. No hemoptysis. Of note, patient does mention that her symptoms may be related to her history of cervical radiculopathy, appears to have anterior cervical fusion in her past.  She states he does get intermittent pains, she is denying any other significant neck pain, weakness in the left upper extremity. REVIEW OF SYSTEMS    Constitutional:  Denies fever, chills. HENT:  Denies sore throat or ear pain   Cardiovascular:  +Chest Pain,  Denies palpitations,  Denies syncope, no extremity edema. Respiratory:    Denies cough, shortness of breath, or hemoptysis    GI:  Denies abdominal pain, nausea, vomiting, or diarrhea  : See HPI  Musculoskeletal:  Denies back pain, no extremity pain, swelling or discoloration.   No pale or cold extremity  Skin:  Denies rash  Neurologic:  Denies changes in vision,  lightheadedness, dizziness, headache, focal weakness or sensory changes   Endocrine:  Denies polyuria or polydypsia   Lymphatic:  Denies swollen glands     All other review of systems are negative  See HPI and nursing notes for additional information         PAST MEDICAL & SURGICAL HISTORY    Past Medical History:   Diagnosis Date    Anxiety     Headache(784.0)     Hypertension     Migraine     UTI (urinary tract infection)      Past Surgical History:   Procedure Laterality Date    TUBAL LIGATION         CURRENT MEDICATIONS    Current Outpatient Rx   Medication Sig Dispense Refill    naproxen (NAPROSYN) 500 MG tablet Take 1 tablet by mouth 2 times daily as needed for Pain 30 tablet 0    methylPREDNISolone (MEDROL, HARI,) 4 MG tablet Take by mouth. 1 kit 0    lidocaine (LIDODERM) 5 % Place 1 patch onto the skin daily 12 hours on, 12 hours off. 10 patch 0    dextran 70-hypromellose (ARTIFICIAL TEARS) 0.1-0.3 % SOLN opthalmic solution Place 1 drop into both eyes every 4 hours as needed (eye irritation) 1 Bottle 2    methocarbamol (ROBAXIN) 500 MG tablet Take 1 tablet by mouth 4 times daily As needed for muscle spasm.  20 tablet 0    metoprolol succinate (TOPROL XL) 25 MG extended release tablet Take 1 tablet by mouth daily 30 tablet 0       ALLERGIES    Allergies   Allergen Reactions    Phenergan [Promethazine Hcl] Other (See Comments)     \"everything happens, I get anxious\"       SOCIAL & FAMILY HISTORY    Social History     Socioeconomic History    Marital status:      Spouse name: None    Number of children: None    Years of education: None    Highest education level: None   Occupational History    None   Social Needs    Financial resource strain: None    Food insecurity     Worry: None     Inability: None    Transportation needs     Medical: None     Non-medical: None   Tobacco Use    Smoking status: Never Smoker    Smokeless tobacco: Never Used   Substance and Sexual Activity    Alcohol use: Yes     Comment: occasionally     Drug use: No    Sexual activity: None   Lifestyle    Physical activity     Days per week: None     Minutes per session: None    Stress: None   Relationships    Social connections     Talks on phone: None     Gets together: None     Attends Roman Catholic service: None     Active member of club or organization: None     Attends meetings of clubs or organizations: None     Relationship status: None    Intimate partner violence     Fear of current or ex partner: None     Emotionally abused: None     Physically abused: None     Forced sexual activity: None   Other Topics Concern    None   Social History Narrative    None     History reviewed. No pertinent family history. PHYSICAL EXAM    VITAL SIGNS: /86   Pulse 83   Temp 98 °F (36.7 °C) (Oral)   Resp 16   Ht 5' (1.524 m)   Wt 180 lb (81.6 kg)   SpO2 100%   BMI 35.15 kg/m²    Constitutional:  Well developed, well nourished, no acute distress   HENT:  Atraumatic, moist mucus membranes  Neck/Lymphatics: supple, no JVD, no swollen nodes  Respiratory:  Nonlabored breathing. Lungs Clear, no retractions. Cardiovascular:  RRR, no murmurs/rubs/gallops. No carotid bruits or murmurs heard in carotids. No JVD  Reproducible tenderness in the left chest wall area. Vascular:    Radial and femoral pulses palpable and reveal no discernible inequality  GI:  Soft, nontender, normal bowel sounds  Musculoskeletal:    There is no edema, asymmetry, or calf / thigh tenderness bilaterally. No cyanosis. No cool or pale-appearing limb.   Distal cap refill and pulses intact bilateral upper and lower extremities  Bilateral upper and lower extremity ROM intact without pain or obvious deficit  Integument:  Skin warm and dry, no petechiae   Neurologic:  Alert & oriented, normal speech    - Alert & oriented person, place, time, and situation, no speech difficulties or slurring.  - No obvious gross motor deficits  - Cranial nerves 2-12 grossly intact  - Negative meningeal signs.  - Sensation intact to light touch  - Strength 5/5 in upper and lower extremities bilaterally  - No truncal ataxia  Psych: Pleasant affect, no hallucinations    LABS:  Results for orders placed or performed during the hospital encounter of 04/17/21   CBC auto diff   Result Value Ref Range    WBC 8.4 4.0 - 10.5 K/CU MM    RBC 4.52 4.2 - 5.4 M/CU MM    Hemoglobin 13.4 12.5 - 16.0 GM/DL    Hematocrit 39.2 37 - 47 %    MCV 86.7 78 - 100 FL    MCH 29.6 27 - 31 PG    MCHC 34.2 32.0 - 36.0 %    RDW 13.4 11.7 - 14.9 %    Platelets 120 684 - 147 K/CU MM    MPV 10.2 7.5 - 11.1 FL    Differential Type AUTOMATED DIFFERENTIAL     Segs Relative 60.8 36 - 66 %    Lymphocytes % 29.0 24 - 44 %    Monocytes % 7.0 (H) 0 - 4 %    Eosinophils % 2.4 0 - 3 %    Basophils % 0.7 0 - 1 %    Segs Absolute 5.1 K/CU MM    Lymphocytes Absolute 2.4 K/CU MM    Monocytes Absolute 0.6 K/CU MM    Eosinophils Absolute 0.2 K/CU MM    Basophils Absolute 0.1 K/CU MM    Nucleated RBC % 0.0 %    Total Nucleated RBC 0.0 K/CU MM    Total Immature Neutrophil 0.01 K/CU MM    Immature Neutrophil % 0.1 0 - 0.43 %   CMP   Result Value Ref Range    Sodium 132 (L) 135 - 145 MMOL/L    Potassium 3.5 3.5 - 5.1 MMOL/L    Chloride 100 99 - 110 mMol/L    CO2 23 21 - 32 MMOL/L    BUN 16 6 - 23 MG/DL    CREATININE 0.9 0.6 - 1.1 MG/DL    Glucose 77 70 - 99 MG/DL    Calcium 9.0 8.3 - 10.6 MG/DL    Albumin 3.6 3.4 - 5.0 GM/DL    Total Protein 7.6 6.4 - 8.2 GM/DL    Total Bilirubin 0.6 0.0 - 1.0 MG/DL    ALT 25 10 - 40 U/L    AST 51 (H) 15 - 37 IU/L    Alkaline Phosphatase 109 40 - 129 IU/L    GFR Non-African American >60 >60 mL/min/1.73m2    GFR African American >60 >60 mL/min/1.73m2    Anion Gap 9 4 - 16   Lipase   Result Value Ref Range    Lipase 19 13 - 60 IU/L   Troponin   Result Value Ref Range    Troponin T <0.010 <0.01 NG/ML   HCG Serum, ----------------------------------------------------------------------------------------------------------------------      Pt's Wells score is <2 and therefore a PERC score was calculated (see below)  ----------------------------------------------------------------------------------------------------------------------      ----------------------------------------------------------------------------------------------------------------------        Pt is negative for all the above criteria and therefore is low risk for having a PE and does not require further testing.  ----------------------------------------------------------------------------------------------------------------------        ED COURSE & MEDICAL DECISION MAKING    Exact etiology of patient's symptoms unknown at this time. Most likely musculoskeletal or related to patient's history of cervical radiculopathy, states that she was at work lifting a lot of patients which may have aggravated her symptoms. Patient otherwise had normal vital signs throughout her emergency department valuation, on reevaluation after medication was sleeping resting comfortably, requesting discharge. Will provide NSAIDs, muscle relaxers, will encourage cardiac follow-up, close monitoring of symptoms and strict return precautions    I considered but do not suspect Aortic discection / aneurysm. Pt does not report sudden onset of tearing pain, pain does not migrate, pt denies concurrent neuro symptoms (& pt neurologically intact on exam today), and I do not appreciate inequality of pulses on exam today. Additionally, CXR is without abnormality suggestive of TAD     I also considered pulmonary embolism as the cause of symptoms today.   Using risk stratification tools today (see note above for details), Pt was found low risk for PE.    ------------------------------  History: 0  EC  Patient Age: 2  *Risk factors for Atherosclerotic disease: Hypertension  Risk Factors: 1  Troponin: 0  Heart Score Total: 1         I completed a HEART Score to screen for Major Adverse Cardiac Event (MACE) in this patient. The evidence indicates that the patient is low risk for MACE and this I consistent with my clinical intuition. The risk of further workup or hospitalization for MACE is likely higher than the risk of the patient have a MACE. It is, therefore, in the patient's best interest not do additional emergent testing or to be hospitalized for MACE. I have discussed with the patient my clinical impression and the result of the HEART score to screen for MACE, as well as the risks of further testing and hospitalization. We also discussed that the Heart Score shows that risk for MACE is less than 2%. I offered a repeat troponin at >3 hrs from initial troponin to further decrease risk of MACE to less than 1%. Patient elects to not have a repeat troponin and is comfortable with < 2% risk of MACE as mentioned above. Patient agrees to immediately return to the emergency department if symptoms recur, worsen, or any new symptoms occur, or any other general concerns - this was discussed in detail at bedside today with Pt who understands and agrees with RTED precautions. Otherwise, Pt agrees to call PCP for recheck within the next several days. ------------------------------    Clinical  IMPRESSION    1. Chest pain, unspecified type      Comment: Please note this report has been produced using speech recognition software and may contain errors related to that system including errors in grammar, punctuation, and spelling, as well as words and phrases that may be inappropriate. If there are any questions or concerns please feel free to contact the dictating provider for clarification.       Vee Aiken 411, PA  04/17/21 9339

## 2021-04-18 LAB
EKG ATRIAL RATE: 82 BPM
EKG DIAGNOSIS: NORMAL
EKG P AXIS: 33 DEGREES
EKG P-R INTERVAL: 150 MS
EKG Q-T INTERVAL: 360 MS
EKG QRS DURATION: 82 MS
EKG QTC CALCULATION (BAZETT): 420 MS
EKG R AXIS: 6 DEGREES
EKG T AXIS: 29 DEGREES
EKG VENTRICULAR RATE: 82 BPM

## 2021-04-18 PROCEDURE — 93010 ELECTROCARDIOGRAM REPORT: CPT | Performed by: INTERNAL MEDICINE

## 2021-07-06 ENCOUNTER — HOSPITAL ENCOUNTER (EMERGENCY)
Age: 41
Discharge: HOME | End: 2021-07-06
Attending: EMERGENCY MEDICINE
Payer: COMMERCIAL

## 2021-07-06 ENCOUNTER — APPOINTMENT (OUTPATIENT)
Dept: GENERAL RADIOLOGY | Age: 41
End: 2021-07-06
Payer: COMMERCIAL

## 2021-07-06 VITALS
HEART RATE: 87 BPM | SYSTOLIC BLOOD PRESSURE: 122 MMHG | WEIGHT: 182 LBS | BODY MASS INDEX: 35.73 KG/M2 | RESPIRATION RATE: 18 BRPM | DIASTOLIC BLOOD PRESSURE: 92 MMHG | HEIGHT: 60 IN | TEMPERATURE: 98.1 F

## 2021-07-06 DIAGNOSIS — S93.602A SPRAIN OF LEFT FOOT, INITIAL ENCOUNTER: ICD-10-CM

## 2021-07-06 DIAGNOSIS — S93.492A SPRAIN OF ANTERIOR TALOFIBULAR LIGAMENT OF LEFT ANKLE, INITIAL ENCOUNTER: Primary | ICD-10-CM

## 2021-07-06 PROCEDURE — 99284 EMERGENCY DEPT VISIT MOD MDM: CPT

## 2021-07-06 PROCEDURE — 73610 X-RAY EXAM OF ANKLE: CPT

## 2021-07-06 RX ORDER — HYDROCODONE BITARTRATE AND ACETAMINOPHEN 5; 325 MG/1; MG/1
1 TABLET ORAL EVERY 6 HOURS PRN
Qty: 12 TABLET | Refills: 0 | Status: SHIPPED | OUTPATIENT
Start: 2021-07-06 | End: 2021-07-09

## 2021-07-06 RX ORDER — IBUPROFEN 800 MG/1
800 TABLET ORAL EVERY 8 HOURS PRN
Qty: 24 TABLET | Refills: 0 | Status: SHIPPED | OUTPATIENT
Start: 2021-07-06 | End: 2021-09-29

## 2021-07-06 ASSESSMENT — PAIN DESCRIPTION - PAIN TYPE
TYPE: ACUTE PAIN
TYPE: ACUTE PAIN

## 2021-07-06 ASSESSMENT — PAIN DESCRIPTION - DESCRIPTORS: DESCRIPTORS: SHARP

## 2021-07-06 ASSESSMENT — PAIN DESCRIPTION - LOCATION: LOCATION: ANKLE

## 2021-07-06 ASSESSMENT — PAIN DESCRIPTION - ONSET: ONSET: SUDDEN

## 2021-07-06 ASSESSMENT — PAIN DESCRIPTION - FREQUENCY: FREQUENCY: CONTINUOUS

## 2021-07-06 ASSESSMENT — PAIN SCALES - GENERAL
PAINLEVEL_OUTOF10: 10
PAINLEVEL_OUTOF10: 10

## 2021-07-06 ASSESSMENT — PAIN DESCRIPTION - ORIENTATION: ORIENTATION: RIGHT

## 2021-07-06 NOTE — ED NOTES
Discharge instructions reviewed with patient. Medications and follow up were discussed.  Patient denies further questions and verbalizes understanding     ODIN's, RN  07/06/21 3300
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
## 2021-07-06 NOTE — LETTER
[facility] Emergency Department  [address] [id number]  Phone: [phone]  Fax: [phone]             July 7, 2021    Patient: Poly Dan   YOB: 1980   Date of Visit: 7/6/2021       To Whom It May Concern:    Abhilash Barlow was seen and treated in our emergency department on 7/6/2021. She may return to work on 7/12/2021.       Sincerely,     Nurse        Signature:__________________________________

## 2021-07-28 ENCOUNTER — HOSPITAL ENCOUNTER (EMERGENCY)
Age: 41
Discharge: HOME | End: 2021-07-28
Attending: EMERGENCY MEDICINE
Payer: COMMERCIAL

## 2021-07-28 VITALS
HEART RATE: 84 BPM | OXYGEN SATURATION: 100 % | RESPIRATION RATE: 20 BRPM | TEMPERATURE: 98.2 F | DIASTOLIC BLOOD PRESSURE: 96 MMHG | WEIGHT: 170 LBS | HEIGHT: 60 IN | SYSTOLIC BLOOD PRESSURE: 138 MMHG | BODY MASS INDEX: 33.38 KG/M2

## 2021-07-28 DIAGNOSIS — S40.022A TRAUMATIC ECCHYMOSIS OF LEFT UPPER ARM, INITIAL ENCOUNTER: ICD-10-CM

## 2021-07-28 DIAGNOSIS — S09.90XA CLOSED HEAD INJURY, INITIAL ENCOUNTER: ICD-10-CM

## 2021-07-28 DIAGNOSIS — S00.33XA CONTUSION OF NOSE, INITIAL ENCOUNTER: Primary | ICD-10-CM

## 2021-07-28 PROCEDURE — 99284 EMERGENCY DEPT VISIT MOD MDM: CPT

## 2021-07-28 PROCEDURE — 6370000000 HC RX 637 (ALT 250 FOR IP): Performed by: EMERGENCY MEDICINE

## 2021-07-28 RX ORDER — ACETAMINOPHEN 325 MG/1
650 TABLET ORAL ONCE
Status: COMPLETED | OUTPATIENT
Start: 2021-07-28 | End: 2021-07-28

## 2021-07-28 RX ORDER — HYDROCODONE BITARTRATE AND ACETAMINOPHEN 5; 325 MG/1; MG/1
1 TABLET ORAL ONCE
Status: COMPLETED | OUTPATIENT
Start: 2021-07-28 | End: 2021-07-28

## 2021-07-28 RX ADMIN — ACETAMINOPHEN 650 MG: 325 TABLET ORAL at 04:27

## 2021-07-28 RX ADMIN — HYDROCODONE BITARTRATE AND ACETAMINOPHEN 1 TABLET: 5; 325 TABLET ORAL at 04:27

## 2021-07-28 ASSESSMENT — PAIN DESCRIPTION - PAIN TYPE: TYPE: ACUTE PAIN

## 2021-07-28 ASSESSMENT — PAIN SCALES - GENERAL
PAINLEVEL_OUTOF10: 7
PAINLEVEL_OUTOF10: 8

## 2021-07-28 ASSESSMENT — PAIN DESCRIPTION - LOCATION: LOCATION: FACE;ARM
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
## 2021-07-28 NOTE — ED NOTES
Reviewed AVS with patient who verbalized understanding. Discharged patient.      Camacho Aden RN  07/28/21 1144

## 2021-08-31 ENCOUNTER — HOSPITAL ENCOUNTER (EMERGENCY)
Age: 41
Discharge: HOME | End: 2021-08-31
Attending: EMERGENCY MEDICINE
Payer: COMMERCIAL

## 2021-08-31 VITALS
SYSTOLIC BLOOD PRESSURE: 128 MMHG | DIASTOLIC BLOOD PRESSURE: 88 MMHG | RESPIRATION RATE: 16 BRPM | HEART RATE: 85 BPM | TEMPERATURE: 98.5 F | OXYGEN SATURATION: 95 %

## 2021-08-31 DIAGNOSIS — B34.9 VIRAL ILLNESS: Primary | ICD-10-CM

## 2021-08-31 LAB
SARS-COV-2, NAAT: NOT DETECTED
SOURCE: NORMAL

## 2021-08-31 PROCEDURE — 87635 SARS-COV-2 COVID-19 AMP PRB: CPT

## 2021-08-31 PROCEDURE — 99284 EMERGENCY DEPT VISIT MOD MDM: CPT

## 2021-08-31 RX ORDER — ONDANSETRON 4 MG/1
4 TABLET, ORALLY DISINTEGRATING ORAL EVERY 8 HOURS PRN
Qty: 15 TABLET | Refills: 0 | Status: SHIPPED | OUTPATIENT
Start: 2021-08-31 | End: 2021-09-29

## 2021-08-31 ASSESSMENT — PAIN SCALES - GENERAL: PAINLEVEL_OUTOF10: 8

## 2021-08-31 NOTE — ED NOTES
Discharge paperwork reviewed with Pt, all questions answered.  Pt ambulated to aurelia Hdz RN  08/31/21 9702

## 2021-08-31 NOTE — ED TRIAGE NOTES
Pt states that she began having symptoms of chills, body aches, sob, diarrhea and coughing  for the past 4 days. States that she has taken robutussin and ibuprofen.
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
## 2021-09-29 ENCOUNTER — APPOINTMENT (OUTPATIENT)
Dept: CT IMAGING | Age: 41
End: 2021-09-29
Payer: COMMERCIAL

## 2021-09-29 ENCOUNTER — HOSPITAL ENCOUNTER (EMERGENCY)
Age: 41
Discharge: HOME | End: 2021-09-29
Payer: COMMERCIAL

## 2021-09-29 VITALS
DIASTOLIC BLOOD PRESSURE: 86 MMHG | TEMPERATURE: 98 F | SYSTOLIC BLOOD PRESSURE: 115 MMHG | BODY MASS INDEX: 34.04 KG/M2 | RESPIRATION RATE: 17 BRPM | WEIGHT: 185 LBS | OXYGEN SATURATION: 100 % | HEIGHT: 62 IN | HEART RATE: 59 BPM

## 2021-09-29 DIAGNOSIS — M54.42 ACUTE LEFT-SIDED LOW BACK PAIN WITH LEFT-SIDED SCIATICA: Primary | ICD-10-CM

## 2021-09-29 DIAGNOSIS — R10.12 LEFT UPPER QUADRANT ABDOMINAL PAIN: ICD-10-CM

## 2021-09-29 LAB
ALBUMIN SERPL-MCNC: 4.5 GM/DL (ref 3.4–5)
ALP BLD-CCNC: 125 IU/L (ref 40–129)
ALT SERPL-CCNC: 19 U/L (ref 10–40)
ANION GAP SERPL CALCULATED.3IONS-SCNC: 10 MMOL/L (ref 4–16)
AST SERPL-CCNC: 17 IU/L (ref 15–37)
BACTERIA: NEGATIVE /HPF
BASOPHILS ABSOLUTE: 0.1 K/CU MM
BASOPHILS RELATIVE PERCENT: 1 % (ref 0–1)
BILIRUB SERPL-MCNC: 0.4 MG/DL (ref 0–1)
BILIRUBIN URINE: NEGATIVE MG/DL
BLOOD, URINE: ABNORMAL
BUN BLDV-MCNC: 23 MG/DL (ref 6–23)
CALCIUM SERPL-MCNC: 9.1 MG/DL (ref 8.3–10.6)
CHLORIDE BLD-SCNC: 99 MMOL/L (ref 99–110)
CLARITY: CLEAR
CO2: 23 MMOL/L (ref 21–32)
COLOR: YELLOW
CREAT SERPL-MCNC: 0.7 MG/DL (ref 0.6–1.1)
DIFFERENTIAL TYPE: ABNORMAL
EOSINOPHILS ABSOLUTE: 0.2 K/CU MM
EOSINOPHILS RELATIVE PERCENT: 3.4 % (ref 0–3)
GFR AFRICAN AMERICAN: >60 ML/MIN/1.73M2
GFR NON-AFRICAN AMERICAN: >60 ML/MIN/1.73M2
GLUCOSE BLD-MCNC: 99 MG/DL (ref 70–99)
GLUCOSE, URINE: NEGATIVE MG/DL
HCG QUALITATIVE: NEGATIVE
HCT VFR BLD CALC: 39.8 % (ref 37–47)
HEMOGLOBIN: 12.9 GM/DL (ref 12.5–16)
HYALINE CASTS: 2 /LPF
IMMATURE NEUTROPHIL %: 0.3 % (ref 0–0.43)
KETONES, URINE: ABNORMAL MG/DL
LEUKOCYTE ESTERASE, URINE: NEGATIVE
LIPASE: 17 IU/L (ref 13–60)
LYMPHOCYTES ABSOLUTE: 2.7 K/CU MM
LYMPHOCYTES RELATIVE PERCENT: 42.4 % (ref 24–44)
MAGNESIUM: 2 MG/DL (ref 1.8–2.4)
MCH RBC QN AUTO: 28.9 PG (ref 27–31)
MCHC RBC AUTO-ENTMCNC: 32.4 % (ref 32–36)
MCV RBC AUTO: 89 FL (ref 78–100)
MONOCYTES ABSOLUTE: 0.4 K/CU MM
MONOCYTES RELATIVE PERCENT: 6.6 % (ref 0–4)
MUCUS: ABNORMAL HPF
NITRITE URINE, QUANTITATIVE: NEGATIVE
NUCLEATED RBC %: 0 %
PDW BLD-RTO: 12.8 % (ref 11.7–14.9)
PH, URINE: 5 (ref 5–8)
PLATELET # BLD: 254 K/CU MM (ref 140–440)
PMV BLD AUTO: 10.2 FL (ref 7.5–11.1)
POTASSIUM SERPL-SCNC: 3.5 MMOL/L (ref 3.5–5.1)
PROTEIN UA: NEGATIVE MG/DL
RBC # BLD: 4.47 M/CU MM (ref 4.2–5.4)
RBC URINE: 3 /HPF (ref 0–6)
SEGMENTED NEUTROPHILS ABSOLUTE COUNT: 2.9 K/CU MM
SEGMENTED NEUTROPHILS RELATIVE PERCENT: 46.3 % (ref 36–66)
SODIUM BLD-SCNC: 132 MMOL/L (ref 135–145)
SPECIFIC GRAVITY UA: 1.03 (ref 1–1.03)
SQUAMOUS EPITHELIAL: 1 /HPF
TOTAL IMMATURE NEUTOROPHIL: 0.02 K/CU MM
TOTAL NUCLEATED RBC: 0 K/CU MM
TOTAL PROTEIN: 8.3 GM/DL (ref 6.4–8.2)
TRICHOMONAS: ABNORMAL /HPF
UROBILINOGEN, URINE: NEGATIVE MG/DL (ref 0.2–1)
WBC # BLD: 6.3 K/CU MM (ref 4–10.5)
WBC UA: <1 /HPF (ref 0–5)

## 2021-09-29 PROCEDURE — 83690 ASSAY OF LIPASE: CPT

## 2021-09-29 PROCEDURE — 96372 THER/PROPH/DIAG INJ SC/IM: CPT

## 2021-09-29 PROCEDURE — 83735 ASSAY OF MAGNESIUM: CPT

## 2021-09-29 PROCEDURE — 80053 COMPREHEN METABOLIC PANEL: CPT

## 2021-09-29 PROCEDURE — 84703 CHORIONIC GONADOTROPIN ASSAY: CPT

## 2021-09-29 PROCEDURE — 74176 CT ABD & PELVIS W/O CONTRAST: CPT

## 2021-09-29 PROCEDURE — 36415 COLL VENOUS BLD VENIPUNCTURE: CPT

## 2021-09-29 PROCEDURE — 6360000002 HC RX W HCPCS: Performed by: PHYSICIAN ASSISTANT

## 2021-09-29 PROCEDURE — 99285 EMERGENCY DEPT VISIT HI MDM: CPT

## 2021-09-29 PROCEDURE — 81001 URINALYSIS AUTO W/SCOPE: CPT

## 2021-09-29 PROCEDURE — 85025 COMPLETE CBC W/AUTO DIFF WBC: CPT

## 2021-09-29 RX ORDER — CYCLOBENZAPRINE HCL 10 MG
10 TABLET ORAL 3 TIMES DAILY PRN
Qty: 20 TABLET | Refills: 0 | Status: SHIPPED | OUTPATIENT
Start: 2021-09-29 | End: 2021-10-09

## 2021-09-29 RX ORDER — KETOROLAC TROMETHAMINE 30 MG/ML
30 INJECTION, SOLUTION INTRAMUSCULAR; INTRAVENOUS ONCE
Status: COMPLETED | OUTPATIENT
Start: 2021-09-29 | End: 2021-09-29

## 2021-09-29 RX ORDER — NAPROXEN 500 MG/1
500 TABLET ORAL 2 TIMES DAILY PRN
Qty: 20 TABLET | Refills: 0 | Status: SHIPPED | OUTPATIENT
Start: 2021-09-29 | End: 2022-09-28 | Stop reason: ALTCHOICE

## 2021-09-29 RX ORDER — ONDANSETRON 4 MG/1
4 TABLET, FILM COATED ORAL EVERY 8 HOURS PRN
Qty: 10 TABLET | Refills: 0 | Status: SHIPPED | OUTPATIENT
Start: 2021-09-29

## 2021-09-29 RX ADMIN — KETOROLAC TROMETHAMINE 30 MG: 30 INJECTION, SOLUTION INTRAMUSCULAR; INTRAVENOUS at 08:45

## 2021-09-29 ASSESSMENT — PAIN SCALES - GENERAL
PAINLEVEL_OUTOF10: 10
PAINLEVEL_OUTOF10: 10

## 2021-09-29 ASSESSMENT — PAIN DESCRIPTION - PAIN TYPE: TYPE: ACUTE PAIN

## 2021-09-29 NOTE — ED PROVIDER NOTES
Patient Identification  Lisa Huffman is a 47 y.o. female    Chief Complaint  Flank Pain (L side, sharp )      HPI  (History provided by patient)  This is a 47 y.o. female who was brought in by self for chief complaint of flank pain. Onset was last night. Pain began after standing up from a chair to answer call light at work. Reports it began suddenly. Located in the left mid back. Pain is 10 out of 10. Sharp and constant. Reports occasional radiation into the left posterior buttock and thigh. No radiation into flank or abdomen. No dysuria or hematuria. Notes nausea but no vomiting. No changes in stool. REVIEW OF SYSTEMS    Constitutional:  Denies fever, chills  HENT:  Denies sore throat or ear pain   Eyes: Denies vision changes, eye pain  Cardiovascular:  Denies chest pain, syncope  Respiratory:  Denies shortness of breath, cough   GI:  Denies abdominal pain, vomiting.  + nausea  :  Denies dysuria, discharge  Musculoskeletal:  Denies joint pain.  + back pain  Skin:  Denies rash, pruritis  Neurologic:  Denies headache, focal weakness, or sensory changes     See HPI and nursing notes for additional information     I have reviewed the following nursing documentation:  Allergies: Allergies   Allergen Reactions    Phenergan [Promethazine Hcl] Other (See Comments)     \"everything happens, I get anxious\"       Past medical history:  has a past medical history of Anxiety, Headache(784.0), Hypertension, Migraine, and UTI (urinary tract infection). Past surgical history:  has a past surgical history that includes Tubal ligation and back surgery. Home medications:   Prior to Admission medications    Medication Sig Start Date End Date Taking?  Authorizing Provider   naproxen (NAPROSYN) 500 MG tablet Take 1 tablet by mouth 2 times daily as needed for Pain 9/29/21 10/9/21 Yes Josué Rockwell PA-C   cyclobenzaprine (FLEXERIL) 10 MG tablet Take 1 tablet by mouth 3 times daily as needed for Muscle spasms 9/29/21 10/9/21 Yes Josué Rockwell PA-C   ondansetron (ZOFRAN) 4 MG tablet Take 1 tablet by mouth every 8 hours as needed for Nausea 9/29/21  Yes Marina Rockwell PA-C   Dextromethorphan-guaiFENesin 5-100 MG/5ML LIQD Take 5 mLs by mouth every 8 hours as needed (congestion, cough) 8/31/21   David Frost DO   methylPREDNISolone (MEDROL, HARI,) 4 MG tablet Take by mouth. 5/21/20   Abundio Lassiter MD   lidocaine (LIDODERM) 5 % Place 1 patch onto the skin daily 12 hours on, 12 hours off. 5/17/19   Anastasiia Cole PA-C   dextran 70-hypromellose (ARTIFICIAL TEARS) 0.1-0.3 % SOLN opthalmic solution Place 1 drop into both eyes every 4 hours as needed (eye irritation) 4/14/19   Andi Adams MD   metoprolol succinate (TOPROL XL) 25 MG extended release tablet Take 1 tablet by mouth daily 3/5/19   David Frost DO       Social history:  reports that she has never smoked. She has never used smokeless tobacco. She reports previous alcohol use. She reports that she does not use drugs. Family history:  History reviewed. No pertinent family history. Exam  /87   Pulse 69   Temp 98 °F (36.7 °C) (Oral)   Resp 17   Ht 5' 2\" (1.575 m)   Wt 185 lb (83.9 kg)   LMP 08/28/2021   SpO2 100%   BMI 33.84 kg/m²   Nursing note and vitals reviewed. Constitutional: Well developed, well nourished. No acute distress. HENT:      Head: Normocephalic and atraumatic. Ears: External ears normal.      Nose: Nose normal.     Mouth: Membrane mucosa moist and pink. No posterior oropharynx erythema or tonsillar edema  Eyes: Anicteric sclera. No discharge, PERRL  Neck: Supple. Trachea midline. Cardiovascular: RRR, no murmurs, rubs, or gallops, radial pulses 2+ bilaterally. Pulmonary/Chest: Effort normal. No respiratory distress. CTAB. No stridor. No wheezes. No rales. Abdominal: Soft. TTP in LUQ. No distension. No guarding, rebound tenderness, or evidence of ascites. : + L CVA tenderness.     Musculoskeletal: Moves all extremities. No gross deformity. + L upper lumbar TTP noted. No Midline T, L spine TTP. Neurological: Alert and oriented to person, place, and time. Normal muscle tone. Skin: Warm and dry. No rash. Psychiatric: Normal mood and affect. Behavior is normal.      Radiographs (if obtained):  [] The following radiograph was interpreted by myself in the absence of a radiologist:   [x] Radiologist's Report Reviewed:  CT ABDOMEN PELVIS WO CONTRAST Additional Contrast? None   Final Result   No renal calculus or renal or bowel obstruction.                 Labs  Results for orders placed or performed during the hospital encounter of 09/29/21   CBC Auto Differential   Result Value Ref Range    WBC 6.3 4.0 - 10.5 K/CU MM    RBC 4.47 4.2 - 5.4 M/CU MM    Hemoglobin 12.9 12.5 - 16.0 GM/DL    Hematocrit 39.8 37 - 47 %    MCV 89.0 78 - 100 FL    MCH 28.9 27 - 31 PG    MCHC 32.4 32.0 - 36.0 %    RDW 12.8 11.7 - 14.9 %    Platelets 805 796 - 295 K/CU MM    MPV 10.2 7.5 - 11.1 FL    Differential Type AUTOMATED DIFFERENTIAL     Segs Relative 46.3 36 - 66 %    Lymphocytes % 42.4 24 - 44 %    Monocytes % 6.6 (H) 0 - 4 %    Eosinophils % 3.4 (H) 0 - 3 %    Basophils % 1.0 0 - 1 %    Segs Absolute 2.9 K/CU MM    Lymphocytes Absolute 2.7 K/CU MM    Monocytes Absolute 0.4 K/CU MM    Eosinophils Absolute 0.2 K/CU MM    Basophils Absolute 0.1 K/CU MM    Nucleated RBC % 0.0 %    Total Nucleated RBC 0.0 K/CU MM    Total Immature Neutrophil 0.02 K/CU MM    Immature Neutrophil % 0.3 0 - 0.43 %   Comprehensive Metabolic Panel w/ Reflex to MG   Result Value Ref Range    Sodium 132 (L) 135 - 145 MMOL/L    Potassium 3.5 3.5 - 5.1 MMOL/L    Chloride 99 99 - 110 mMol/L    CO2 23 21 - 32 MMOL/L    BUN 23 6 - 23 MG/DL    CREATININE 0.7 0.6 - 1.1 MG/DL    Glucose 99 70 - 99 MG/DL    Calcium 9.1 8.3 - 10.6 MG/DL    Albumin 4.5 3.4 - 5.0 GM/DL    Total Protein 8.3 (H) 6.4 - 8.2 GM/DL    Total Bilirubin 0.4 0.0 - 1.0 MG/DL    ALT 19 10 - 40 U/L    AST 17 15 - 37 IU/L    Alkaline Phosphatase 125 40 - 129 IU/L    GFR Non-African American >60 >60 mL/min/1.73m2    GFR African American >60 >60 mL/min/1.73m2    Anion Gap 10 4 - 16   Lipase   Result Value Ref Range    Lipase 17 13 - 60 IU/L   Urinalysis Reflex to Culture    Specimen: Urine   Result Value Ref Range    Color, UA YELLOW YELLOW    Clarity, UA CLEAR CLEAR    Glucose, Urine NEGATIVE NEGATIVE MG/DL    Bilirubin Urine NEGATIVE NEGATIVE MG/DL    Ketones, Urine SMALL (A) NEGATIVE MG/DL    Specific Gravity, UA 1.035 1.001 - 1.035    Blood, Urine SMALL (A) NEGATIVE    pH, Urine 5.0 5.0 - 8.0    Protein, UA NEGATIVE NEGATIVE MG/DL    Urobilinogen, Urine NEGATIVE 0.2 - 1.0 MG/DL    Nitrite Urine, Quantitative NEGATIVE NEGATIVE    Leukocyte Esterase, Urine NEGATIVE NEGATIVE    RBC, UA 3 0 - 6 /HPF    WBC, UA <1 0 - 5 /HPF    Bacteria, UA NEGATIVE NEGATIVE /HPF    Squam Epithel, UA 1 /HPF    Mucus, UA OCCASIONAL (A) NEGATIVE HPF    Trichomonas, UA NONE SEEN NONE SEEN /HPF    Hyaline Casts, UA 2 /LPF   HCG Qualitative, Serum   Result Value Ref Range    hCG Qual NEGATIVE    Magnesium   Result Value Ref Range    Magnesium 2.0 1.8 - 2.4 mg/dl         MDM  Patient presents for back pain. Also has some left upper quadrant pain on exam.  Laboratory work-up reveals sodium of 132, negative urinalysis. CT abdomen and pelvis shows no renal calculus or renal or bowel obstruction. Patient given Toradol here and pain improved but not resolved. Discussed results with patient. Clinically this appears to be likely a musculoskeletal back injury. She denied abdominal pain during history but did have some pain on exam.  Will start on Naprosyn and Flexeril, Zofran.     I estimate there is LOW risk for ACUTE APPENDICITIS, BOWEL OBSTRUCTION, CHOLECYSTITIS, DIVERTICULITIS, INCARCERATED HERNIA, PANCREATITIS, PELVIC INFLAMMATORY DISEASE, PERFORATED BOWEL, PERFORATED OR BLEEDING ULCER, ECTOPIC PREGNANCY, TUBO-OVARIAN ABSCESS, thus I consider the discharge disposition reasonable. Delfino Darling and I have discussed the diagnosis and risks, and we agree with discharging home to follow-up with their primary doctor. We also discussed returning to the Emergency Department immediately if new or worsening symptoms occur. We have discussed the symptoms which are most concerning (e.g., bloody stool, fever, changing or worsening pain, intractable vomiting) that necessitate immediate return. I estimate there is LOW risk for ABDOMINAL AORTIC ANEURYSM, CAUDA EQUINA SYNDROME, EPIDURAL MASS LESION, SPINAL STENOSIS, OR HERNIATED DISK CAUSING SEVERE STENOSIS, thus I consider the discharge disposition reasonable. Delfino Dislaor and I have discussed the diagnosis and risks, and we agree with discharging home to follow-up with their primary doctor. We also discussed returning to the Emergency Department immediately if new or worsening symptoms occur. We have discussed the symptoms which are most concerning (e.g., saddle anesthesia, urinary or bowel incontinence or retention, changing or worsening pain, weakness) that necessitate immediate return. I have independently evaluated this patient. Final Impression  1. Acute left-sided low back pain with left-sided sciatica    2. Left upper quadrant abdominal pain        Blood pressure 115/87, pulse 69, temperature 98 °F (36.7 °C), temperature source Oral, resp. rate 17, height 5' 2\" (1.575 m), weight 185 lb (83.9 kg), last menstrual period 08/28/2021, SpO2 100 %. Disposition:  Discharge to home in stable condition. Patient was given scripts for the following medications. I counseled patient how to take these medications.      New Prescriptions    CYCLOBENZAPRINE (FLEXERIL) 10 MG TABLET    Take 1 tablet by mouth 3 times daily as needed for Muscle spasms    NAPROXEN (NAPROSYN) 500 MG TABLET    Take 1 tablet by mouth 2 times daily as needed for Pain    ONDANSETRON (ZOFRAN) 4 MG TABLET    Take 1 tablet by mouth every 8 hours as needed for Nausea       This chart was generated using the [address] [place] [address] [address]  dictation system. I created this record but it may contain dictation errors given the limitations of this technology.        Lorie Christie PA-C  09/29/21 7260

## 2021-12-30 ENCOUNTER — APPOINTMENT (OUTPATIENT)
Dept: CT IMAGING | Age: 41
End: 2021-12-30
Payer: COMMERCIAL

## 2021-12-30 ENCOUNTER — HOSPITAL ENCOUNTER (EMERGENCY)
Age: 41
Discharge: HOME | End: 2021-12-30
Attending: EMERGENCY MEDICINE
Payer: COMMERCIAL

## 2021-12-30 VITALS
RESPIRATION RATE: 16 BRPM | DIASTOLIC BLOOD PRESSURE: 79 MMHG | HEIGHT: 60 IN | SYSTOLIC BLOOD PRESSURE: 114 MMHG | BODY MASS INDEX: 35.34 KG/M2 | WEIGHT: 180 LBS | TEMPERATURE: 97.6 F | OXYGEN SATURATION: 99 % | HEART RATE: 73 BPM

## 2021-12-30 DIAGNOSIS — M54.12 CERVICAL RADICULOPATHY: Primary | ICD-10-CM

## 2021-12-30 LAB
EKG ATRIAL RATE: 66 BPM
EKG DIAGNOSIS: NORMAL
EKG P AXIS: 19 DEGREES
EKG P-R INTERVAL: 162 MS
EKG Q-T INTERVAL: 404 MS
EKG QRS DURATION: 92 MS
EKG QTC CALCULATION (BAZETT): 423 MS
EKG R AXIS: 11 DEGREES
EKG T AXIS: 19 DEGREES
EKG VENTRICULAR RATE: 66 BPM

## 2021-12-30 PROCEDURE — 99282 EMERGENCY DEPT VISIT SF MDM: CPT

## 2021-12-30 PROCEDURE — 72125 CT NECK SPINE W/O DYE: CPT

## 2021-12-30 PROCEDURE — 93010 ELECTROCARDIOGRAM REPORT: CPT | Performed by: INTERNAL MEDICINE

## 2021-12-30 PROCEDURE — 70450 CT HEAD/BRAIN W/O DYE: CPT

## 2021-12-30 PROCEDURE — 93005 ELECTROCARDIOGRAM TRACING: CPT | Performed by: EMERGENCY MEDICINE

## 2021-12-30 RX ORDER — GABAPENTIN 300 MG/1
300 CAPSULE ORAL 3 TIMES DAILY
Qty: 90 CAPSULE | Refills: 0 | Status: SHIPPED | OUTPATIENT
Start: 2021-12-30 | End: 2021-12-30 | Stop reason: ALTCHOICE

## 2021-12-30 RX ORDER — IBUPROFEN 800 MG/1
800 TABLET ORAL 4 TIMES DAILY PRN
Qty: 20 TABLET | Refills: 0 | Status: SHIPPED | OUTPATIENT
Start: 2021-12-30 | End: 2022-10-07 | Stop reason: ALTCHOICE

## 2021-12-30 RX ORDER — CYCLOBENZAPRINE HCL 5 MG
5 TABLET ORAL 2 TIMES DAILY PRN
Qty: 20 TABLET | Refills: 0 | Status: SHIPPED | OUTPATIENT
Start: 2021-12-30 | End: 2022-01-09

## 2021-12-30 ASSESSMENT — PAIN DESCRIPTION - LOCATION: LOCATION: ARM

## 2021-12-30 ASSESSMENT — PAIN SCALES - GENERAL: PAINLEVEL_OUTOF10: 7

## 2021-12-30 ASSESSMENT — PAIN DESCRIPTION - PAIN TYPE: TYPE: ACUTE PAIN

## 2021-12-30 NOTE — ED PROVIDER NOTES
Emergency Department Encounter    Patient: Mariam Arndt  MRN: [id number]  : 1980  Date of Evaluation: 2022  ED Provider:  Silas Schwarz MD    Triage Chief Complaint:   Arm Pain (LEFT ARM PAIN SINCE 0800/ REPORTS FATIGUE AND NAUSEA REPORTS CALLED EMS THIS AM REOPRTS HAD EKG DONE AND WAS NORMAL. CALLED PCP AND TOLD TO COME TO ED)    Clinical Impression:  1. Cervical radiculopathy      Disposition referral (if applicable):  Wing Clement DO  [address] [facility] [address]  [address]  [phone]    On 1/3/2022  at 9:15 am at their [place] office. Please take your disc with imaging. ED Provider Disposition Time  DISPOSITION Decision To Discharge 2021 02:49:45 PM       MDM:  Patient presents with left arm pain as below. At this time, there is no focal neurologic deficit. Patient does have a history of cervical myelopathy. Patient is neurovascularly intact. No evidence of infectious emergency at this time. Patient denied trauma. There is no evidence of CVA/TIA at this time. I feel that the patient's symptoms are most consistent with a peripheral etiology. CT evaluation was performed which does show a left-sided neuroforaminal stenosis. Otherwise no evidence of acute emergent condition. No evidence of ACS or malignant arrhythmia. I spoke with the patient's neurosurgeon office who is going to see the patient on Monday at 9:15 AM.  Patient was asked to bring a disc of imaging results which is communicated to the patient. As there is no evidence of acute neurologic, vascular, infectious emergency involving the spine at this time, I do feel the patient is safe for discharge with strict return precautions and close follow-up instructions with her neurosurgeon. Patient will be treated with medications as below.      Medications ordered in the ED:  ED Medication Orders (From admission, onward)    None          Disposition medications (if applicable):  Discharge Medication List as of 12/30/2021  3:42 PM      START taking these medications    Details   ibuprofen (ADVIL;MOTRIN) 800 MG tablet Take 1 tablet by mouth 4 times daily as needed for Pain, Disp-20 tablet, R-0Normal      cyclobenzaprine (FLEXERIL) 5 MG tablet Take 1 tablet by mouth 2 times daily as needed for Muscle spasms, Disp-20 tablet, R-0Normal               HPI:  Pravin Osei is a 46 y.o. female that presents complaining of left arm pain which is intermittent and shooting originating from the left trapezius region extending throughout the entire left upper extremity since the morning of presentation. Patient states that approximately 8 AM on the day of presentation, she experienced an episode of severe shooting pain in this distribution as well as arm \"heaviness. \"  Patient states that the symptoms lasted for approximately 2 hours before resolving. Patient was evaluated by EMS at the time where she reports an EKG was performed. Patient then did not want to be transported. Patient denies any chest pain, shortness of breath, cough, fever. No other changes in vision/speech/swallowing, weakness or changes in sensation. Patient states that several days prior to this episode, patient had another episode involving the left upper extremity which she had difficulty moving her left upper extremity. Patient reports history of cervical myelopathy. No numbness currently in the lower extremities. Patient states that several weeks ago she had some numbness in her left lower extremity that lasted approximately 15 seconds before resolving. No urinary or fecal incontinence or retention.     ROS - see HPI, below listed is current ROS at time of my eval:  General:  No fevers  Eyes:  No eye discharge  ENT:  No ear discharge  Cardiovascular:  No palpitations  Respiratory:  No wheezing  Gastrointestinal:  No hematemesis  Musculoskeletal:  No muscle pain  Skin:  No purpura  Neurologic:  No headache  Psychiatric:  No homicidal ideation  Genitourinary:  No hematuria  Endocrine:  No unexpected weight gain  Extremities:  No edema    Physical Exam:  Triage VS:    ED Triage Vitals [12/30/21 1152]   Enc Vitals Group      /79      Pulse 73      Resp 16      Temp 97.6 °F (36.4 °C)      Temp src       SpO2 99 %      Weight 180 lb (81.6 kg)      Height 5' (1.524 m)      Head Circumference       Peak Flow       Pain Score       Pain Loc       Pain Edu? Excl. in [address]? General appearance:  No acute distress. Skin:  Warm. Dry. Eye:  Extraocular movements intact. Ears, nose, mouth and throat: Patient wearing mask  Neck:  Trachea midline. Extremity:  No swelling. Normal ROM   palpable radial pulse in the bilateral upper extremities. No skin erythema. No swelling. No tenderness to palpation of long bones or joints of the left upper extremity. No tenderness to palpation of the soft tissues of the left upper extremity. Heart:  Regular rate and rhythm, normal S1 & S2, no extra heart sounds. Perfusion:  intact  Respiratory:  Lungs clear to auscultation bilaterally. Respirations nonlabored. Abdominal:  Normal bowel sounds. Soft. Nontender. Non distended. Back:  No CVA tenderness to palpation     Neurological:  Alert and oriented times 3. No focal neuro deficits. Cranial nerves II through XII grossly intact. Strength 5 out of 5 throughout. Sensation intact to light touch throughout. No pronator drift. Psychiatric:  Appropriate    Past Medical History:   Diagnosis Date    Anxiety     Headache(784.0)     Hypertension     Migraine     UTI (urinary tract infection)      Past Surgical History:   Procedure Laterality Date    BACK SURGERY      TUBAL LIGATION       History reviewed. No pertinent family history.   Social History     Socioeconomic History    Marital status:      Spouse name: Not on file    Number of children: Not on file    Years of (FLEXERIL) 5 MG tablet Take 1 tablet by mouth 2 times daily as needed for Muscle spasms 20 tablet 0    naproxen (NAPROSYN) 500 MG tablet Take 1 tablet by mouth 2 times daily as needed for Pain 20 tablet 0    ondansetron (ZOFRAN) 4 MG tablet Take 1 tablet by mouth every 8 hours as needed for Nausea 10 tablet 0    Dextromethorphan-guaiFENesin 5-100 MG/5ML LIQD Take 5 mLs by mouth every 8 hours as needed (congestion, cough) 1 Bottle 0    methylPREDNISolone (MEDROL, HARI,) 4 MG tablet Take by mouth. 1 kit 0    lidocaine (LIDODERM) 5 % Place 1 patch onto the skin daily 12 hours on, 12 hours off.  10 patch 0    dextran 70-hypromellose (ARTIFICIAL TEARS) 0.1-0.3 % SOLN opthalmic solution Place 1 drop into both eyes every 4 hours as needed (eye irritation) 1 Bottle 2    metoprolol succinate (TOPROL XL) 25 MG extended release tablet Take 1 tablet by mouth daily 30 tablet 0     Allergies   Allergen Reactions    Phenergan [Promethazine Hcl] Other (See Comments)     \"everything happens, I get anxious\"       Nursing Notes Reviewed    I have reviewed and interpreted all of the currently available lab results from this visit (if applicable):  Results for orders placed or performed during the hospital encounter of 12/30/21   EKG 12 Lead   Result Value Ref Range    Ventricular Rate 66 BPM    Atrial Rate 66 BPM    P-R Interval 162 ms    QRS Duration 92 ms    Q-T Interval 404 ms    QTc Calculation (Bazett) 423 ms    P Axis 19 degrees    R Axis 11 degrees    T Axis 19 degrees    Diagnosis       Normal sinus rhythm  Normal ECG  When compared with ECG of 17-APR-2021 11:57,  No significant change was found  Confirmed by [facility] Jeane MESA ([id number]) on 12/30/2021 1:47:35 PM        Radiographs (if obtained):  Radiologist's Report Reviewed:  CT CERVICAL SPINE WO CONTRAST   Final Result   Postoperative changes are seen in the neck at the level of C4-C5 and C5-C6,   with multilevel degenerative changes throughout the cervical spine, as well as left-sided foraminal stenosis at C4-C5 and C5-C6, greatest at C5-C6. RECOMMENDATIONS:   Unavailable         CT HEAD WO CONTRAST   Final Result   No acute intracranial abnormality. RECOMMENDATIONS:   Unavailable                 Comment: Please note this report has been produced using speech recognition software and may contain errors related to that system including errors in grammar, punctuation, and spelling, as well as words and phrases that may be inappropriate. Efforts were made to edit the dictations.         Jose Francisco Torre MD  01/01/22 2199

## 2021-12-30 NOTE — Clinical Note
Ishan Batista was seen and treated in our emergency department on 12/30/2021. She may return to work on 01/04/2022. If you have any questions or concerns, please don't hesitate to call.       Merary Zelaya MD

## 2021-12-30 NOTE — ED NOTES
Discharge instructions and prescriptions were reviewed and the patient will follow up with her specialist. The patient was given a disk of today's Images.              Mary Kate Mercedes RN  12/30/21 1810

## 2021-12-30 NOTE — ED NOTES
Dr Lobito Will office was contacted to get information about previous surgery. Awaiting return call.     [address], RN  12/30/21 0820

## 2022-07-05 ENCOUNTER — APPOINTMENT (OUTPATIENT)
Dept: GENERAL RADIOLOGY | Age: 42
End: 2022-07-05

## 2022-07-05 ENCOUNTER — HOSPITAL ENCOUNTER (EMERGENCY)
Age: 42
Discharge: LEFT BEFORE DISCHARGE | End: 2022-07-05

## 2022-07-05 VITALS
RESPIRATION RATE: 18 BRPM | BODY MASS INDEX: 35.73 KG/M2 | SYSTOLIC BLOOD PRESSURE: 126 MMHG | TEMPERATURE: 98 F | OXYGEN SATURATION: 97 % | HEART RATE: 93 BPM | DIASTOLIC BLOOD PRESSURE: 97 MMHG | WEIGHT: 182 LBS | HEIGHT: 60 IN
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
## 2022-09-28 ENCOUNTER — HOSPITAL ENCOUNTER (EMERGENCY)
Age: 42
Discharge: HOME | End: 2022-09-28
Payer: COMMERCIAL

## 2022-09-28 ENCOUNTER — APPOINTMENT (OUTPATIENT)
Dept: GENERAL RADIOLOGY | Age: 42
End: 2022-09-28
Payer: COMMERCIAL

## 2022-09-28 ENCOUNTER — APPOINTMENT (OUTPATIENT)
Dept: CT IMAGING | Age: 42
End: 2022-09-28
Payer: COMMERCIAL

## 2022-09-28 VITALS
SYSTOLIC BLOOD PRESSURE: 128 MMHG | RESPIRATION RATE: 16 BRPM | OXYGEN SATURATION: 100 % | DIASTOLIC BLOOD PRESSURE: 88 MMHG | HEIGHT: 60 IN | WEIGHT: 182 LBS | TEMPERATURE: 98.3 F | HEART RATE: 68 BPM | BODY MASS INDEX: 35.73 KG/M2

## 2022-09-28 DIAGNOSIS — S16.1XXA STRAIN OF NECK MUSCLE, INITIAL ENCOUNTER: Primary | ICD-10-CM

## 2022-09-28 DIAGNOSIS — S39.012A STRAIN OF LUMBAR REGION, INITIAL ENCOUNTER: ICD-10-CM

## 2022-09-28 PROCEDURE — 72125 CT NECK SPINE W/O DYE: CPT

## 2022-09-28 PROCEDURE — 96372 THER/PROPH/DIAG INJ SC/IM: CPT

## 2022-09-28 PROCEDURE — 6370000000 HC RX 637 (ALT 250 FOR IP): Performed by: NURSE PRACTITIONER

## 2022-09-28 PROCEDURE — 6360000002 HC RX W HCPCS: Performed by: NURSE PRACTITIONER

## 2022-09-28 PROCEDURE — 72100 X-RAY EXAM L-S SPINE 2/3 VWS: CPT

## 2022-09-28 PROCEDURE — 99284 EMERGENCY DEPT VISIT MOD MDM: CPT

## 2022-09-28 RX ORDER — CYCLOBENZAPRINE HCL 10 MG
10 TABLET ORAL ONCE
Status: COMPLETED | OUTPATIENT
Start: 2022-09-28 | End: 2022-09-28

## 2022-09-28 RX ORDER — KETOROLAC TROMETHAMINE 30 MG/ML
15 INJECTION, SOLUTION INTRAMUSCULAR; INTRAVENOUS ONCE
Status: DISCONTINUED | OUTPATIENT
Start: 2022-09-28 | End: 2022-09-28

## 2022-09-28 RX ORDER — KETOROLAC TROMETHAMINE 30 MG/ML
15 INJECTION, SOLUTION INTRAMUSCULAR; INTRAVENOUS ONCE
Status: COMPLETED | OUTPATIENT
Start: 2022-09-28 | End: 2022-09-28

## 2022-09-28 RX ORDER — DIPHENHYDRAMINE HCL 25 MG
50 TABLET ORAL ONCE
Status: COMPLETED | OUTPATIENT
Start: 2022-09-28 | End: 2022-09-28

## 2022-09-28 RX ORDER — DIPHENHYDRAMINE HYDROCHLORIDE 50 MG/ML
25 INJECTION INTRAMUSCULAR; INTRAVENOUS ONCE
Status: DISCONTINUED | OUTPATIENT
Start: 2022-09-28 | End: 2022-09-28

## 2022-09-28 RX ORDER — NAPROXEN 500 MG/1
500 TABLET ORAL 2 TIMES DAILY
Qty: 20 TABLET | Refills: 0 | Status: SHIPPED | OUTPATIENT
Start: 2022-09-28 | End: 2022-10-07 | Stop reason: ALTCHOICE

## 2022-09-28 RX ORDER — CYCLOBENZAPRINE HCL 10 MG
10 TABLET ORAL 3 TIMES DAILY PRN
Qty: 15 TABLET | Refills: 0 | Status: SHIPPED | OUTPATIENT
Start: 2022-09-28 | End: 2022-10-03

## 2022-09-28 RX ADMIN — DIPHENHYDRAMINE HCL 50 MG: 25 TABLET ORAL at 17:50

## 2022-09-28 RX ADMIN — KETOROLAC TROMETHAMINE 15 MG: 30 INJECTION, SOLUTION INTRAMUSCULAR; INTRAVENOUS at 17:50

## 2022-09-28 RX ADMIN — CYCLOBENZAPRINE 10 MG: 10 TABLET, FILM COATED ORAL at 19:55

## 2022-09-28 ASSESSMENT — PAIN DESCRIPTION - LOCATION: LOCATION: BACK

## 2022-09-28 ASSESSMENT — PAIN SCALES - GENERAL
PAINLEVEL_OUTOF10: 6
PAINLEVEL_OUTOF10: 6

## 2022-09-28 ASSESSMENT — PAIN DESCRIPTION - ORIENTATION: ORIENTATION: RIGHT

## 2022-09-28 ASSESSMENT — PAIN DESCRIPTION - DESCRIPTORS: DESCRIPTORS: SQUEEZING;THROBBING

## 2022-09-28 NOTE — ED TRIAGE NOTES
Pt arrived to the Ed with complaints of lower back pain and neck pain that started 45 minutes PTA arrival.
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
## 2022-09-28 NOTE — Clinical Note
Mildred Freeman was seen and treated in our emergency department on 9/28/2022. She may return to work on 09/30/2022. If you have any questions or concerns, please don't hesitate to call.       Leila Thorne, ALONSO - CNP

## 2022-09-29 NOTE — ED NOTES
Discharge instructions given for primary RN. Pt refused wheelchair to car.  Pt ambulated out of ED with gait steady, mother at side     Simeon Ambriz RN  09/28/22 2002

## 2022-10-07 ENCOUNTER — APPOINTMENT (OUTPATIENT)
Dept: CT IMAGING | Age: 42
End: 2022-10-07
Payer: COMMERCIAL

## 2022-10-07 ENCOUNTER — HOSPITAL ENCOUNTER (EMERGENCY)
Age: 42
Discharge: HOME | End: 2022-10-07
Attending: EMERGENCY MEDICINE
Payer: COMMERCIAL

## 2022-10-07 VITALS
SYSTOLIC BLOOD PRESSURE: 140 MMHG | OXYGEN SATURATION: 98 % | RESPIRATION RATE: 18 BRPM | HEART RATE: 110 BPM | TEMPERATURE: 97.7 F | DIASTOLIC BLOOD PRESSURE: 99 MMHG

## 2022-10-07 DIAGNOSIS — M53.3 SACRAL PAIN: Primary | ICD-10-CM

## 2022-10-07 LAB — PREGNANCY TEST URINE, POC: NEGATIVE

## 2022-10-07 PROCEDURE — 72192 CT PELVIS W/O DYE: CPT

## 2022-10-07 PROCEDURE — 99284 EMERGENCY DEPT VISIT MOD MDM: CPT

## 2022-10-07 PROCEDURE — 72131 CT LUMBAR SPINE W/O DYE: CPT

## 2022-10-07 PROCEDURE — 6370000000 HC RX 637 (ALT 250 FOR IP): Performed by: EMERGENCY MEDICINE

## 2022-10-07 RX ORDER — IBUPROFEN 600 MG/1
600 TABLET ORAL 3 TIMES DAILY PRN
Qty: 30 TABLET | Refills: 0 | Status: SHIPPED | OUTPATIENT
Start: 2022-10-07

## 2022-10-07 RX ORDER — LIDOCAINE 50 MG/G
1 PATCH TOPICAL DAILY
Qty: 10 PATCH | Refills: 0 | Status: SHIPPED | OUTPATIENT
Start: 2022-10-07 | End: 2022-10-17

## 2022-10-07 RX ORDER — OXYCODONE HYDROCHLORIDE AND ACETAMINOPHEN 5; 325 MG/1; MG/1
1 TABLET ORAL ONCE
Status: COMPLETED | OUTPATIENT
Start: 2022-10-07 | End: 2022-10-07

## 2022-10-07 RX ORDER — IBUPROFEN 600 MG/1
600 TABLET ORAL ONCE
Status: COMPLETED | OUTPATIENT
Start: 2022-10-07 | End: 2022-10-07

## 2022-10-07 RX ORDER — LIDOCAINE 4 G/G
1 PATCH TOPICAL ONCE
Status: DISCONTINUED | OUTPATIENT
Start: 2022-10-07 | End: 2022-10-07 | Stop reason: HOSPADM

## 2022-10-07 RX ORDER — METHOCARBAMOL 500 MG/1
500 TABLET, FILM COATED ORAL 4 TIMES DAILY PRN
Qty: 40 TABLET | Refills: 0 | Status: SHIPPED | OUTPATIENT
Start: 2022-10-07 | End: 2022-10-17

## 2022-10-07 RX ORDER — METHOCARBAMOL 500 MG/1
750 TABLET, FILM COATED ORAL ONCE
Status: COMPLETED | OUTPATIENT
Start: 2022-10-07 | End: 2022-10-07

## 2022-10-07 RX ORDER — HYDROCODONE BITARTRATE AND ACETAMINOPHEN 5; 325 MG/1; MG/1
1 TABLET ORAL EVERY 8 HOURS PRN
Qty: 6 TABLET | Refills: 0 | Status: SHIPPED | OUTPATIENT
Start: 2022-10-07 | End: 2022-10-09

## 2022-10-07 RX ADMIN — METHOCARBAMOL TABLETS 750 MG: 500 TABLET, COATED ORAL at 01:27

## 2022-10-07 RX ADMIN — IBUPROFEN 600 MG: 600 TABLET ORAL at 01:28

## 2022-10-07 RX ADMIN — OXYCODONE AND ACETAMINOPHEN 1 TABLET: 5; 325 TABLET ORAL at 01:28

## 2022-10-07 ASSESSMENT — PAIN SCALES - GENERAL
PAINLEVEL_OUTOF10: 10
PAINLEVEL_OUTOF10: 5
PAINLEVEL_OUTOF10: 4

## 2022-10-07 ASSESSMENT — ENCOUNTER SYMPTOMS
RHINORRHEA: 0
COUGH: 0
ABDOMINAL PAIN: 0
BACK PAIN: 1
VOMITING: 0
SORE THROAT: 0
EYE PAIN: 0
EYE DISCHARGE: 0
NAUSEA: 0
SHORTNESS OF BREATH: 0

## 2022-10-07 ASSESSMENT — PAIN DESCRIPTION - LOCATION
LOCATION: SACRUM
LOCATION: SACRUM
LOCATION: COCCYX
LOCATION: SACRUM

## 2022-10-07 NOTE — DISCHARGE INSTRUCTIONS
Your CT scan today did not show any acute abnormality or acute fractures. You may find that heat and/or ice and anti-inflammatory medications will help your symptoms. If you develop any worsening concerning symptoms, please seek immediate medical evaluation.

## 2022-10-07 NOTE — ED PROVIDER NOTES
[address] [facility] [place] ENCOUNTER      Pt Name: Stone Raymond  MRN: [id number]  [place] 1980  Date of evaluation: 10/7/2022  Provider: Yahaira Gross MD    CHIEF COMPLAINT       Chief Complaint   Patient presents with    Tailbone Pain     Injury 4-5 days ago seen in ER symptoms have progressed, unable to have BM d/t sharp pain         HISTORY OF PRESENT ILLNESS      Stone Raymond is a 46 y.o. female who presents to the emergency department  for   Chief Complaint   Patient presents with    Tailbone Pain     Injury 4-5 days ago seen in ER symptoms have progressed, unable to have BM d/t sharp pain       68-year-old female presents complaining of tailbone pain. She mechanical fall 2 days ago. She was seen in the emergency department after the fall and underwent an x-ray of the lumbar spine that was unremarkable. She reports has had progressive pain. Does endorse some pain with ambulation. She is not having any numbness or paresthesias. No saddle paresthesias. No bowel or urinary incontinence. She does endorse a history of \"back surgery. \"  She has tried some anti-inflammatories at home with completely for symptoms. No difficulty breathing. No abdominal pain. GCS of 15. She is moving extremity spontaneously the emergency department. Nursing Notes, Triage Notes & Vital Signs were reviewed. REVIEW OF SYSTEMS    (2-9 systems for level 4, 10 or more for level 5)     Review of Systems   Constitutional:  Negative for chills and fever. HENT:  Negative for congestion, rhinorrhea and sore throat. Eyes:  Negative for pain and discharge. Respiratory:  Negative for cough and shortness of breath. Cardiovascular:  Negative for chest pain and palpitations. Gastrointestinal:  Negative for abdominal pain, nausea and vomiting. Endocrine: Negative for polydipsia and polyuria.    Genitourinary:  Negative for dysuria and flank pain. Musculoskeletal:  Positive for back pain. Negative for neck pain. Skin:  Negative for wound. Neurological:  Negative for dizziness, facial asymmetry, light-headedness, numbness and headaches. Psychiatric/Behavioral:  Negative for confusion. Except as noted above the remainder of the review of systems was reviewed and negative. PAST MEDICAL HISTORY     Past Medical History:   Diagnosis Date    Anxiety     Headache(784.0)     Hypertension     Migraine     UTI (urinary tract infection)        Prior to Admission medications    Medication Sig Start Date End Date Taking? Authorizing Provider   ibuprofen (ADVIL;MOTRIN) 600 MG tablet Take 1 tablet by mouth 3 times daily as needed for Pain 10/7/22  Yes Yaquelin Leslie MD   lidocaine (LIDODERM) 5 % Place 1 patch onto the skin daily for 10 days 12 hours on, 12 hours off. 10/7/22 10/17/22 Yes Yaquelin Leslie MD   methocarbamol (ROBAXIN) 500 MG tablet Take 1 tablet by mouth 4 times daily as needed (for pain/spasms) 10/7/22 10/17/22 Yes Yaquelin Leslie MD   HYDROcodone-acetaminophen (NORCO) 5-325 MG per tablet Take 1 tablet by mouth every 8 hours as needed for Pain for up to 2 days. Intended supply: 3 days. Take lowest dose possible to manage pain 10/7/22 10/9/22 Yes Yaquelin Leslie MD   gabapentin (NEURONTIN) 300 MG capsule Take 1 capsule by mouth 3 times daily for 5 days.  12/30/21 12/30/21  Daniel Murray MD   ondansetron (ZOFRAN) 4 MG tablet Take 1 tablet by mouth every 8 hours as needed for Nausea 9/29/21   Aye Rockwell PA-C   Dextromethorphan-guaiFENesin 5-100 MG/5ML LIQD Take 5 mLs by mouth every 8 hours as needed (congestion, cough) 8/31/21   Alexa Holden DO   methylPREDNISolone (MEDROL, HARI,) 4 MG tablet Take by mouth. 5/21/20   Cabrera Broussard MD   dextran 70-hypromellose (ARTIFICIAL TEARS) 0.1-0.3 % SOLN opthalmic solution Place 1 drop into both eyes every 4 hours as needed (eye irritation) 4/14/19   Jose Ramon Zeke Jacobo MD   metoprolol succinate (TOPROL XL) 25 MG extended release tablet Take 1 tablet by mouth daily 3/5/19   Belinda Butler DO        Patient Active Problem List   Diagnosis    Headache    Left-sided weakness    Slurred speech    Less than 8 weeks gestation of pregnancy    Vision changes         SURGICAL HISTORY       Past Surgical History:   Procedure Laterality Date    BACK SURGERY      TUBAL LIGATION           CURRENT MEDICATIONS       Previous Medications    DEXTRAN 70-HYPROMELLOSE (ARTIFICIAL TEARS) 0.1-0.3 % SOLN OPTHALMIC SOLUTION    Place 1 drop into both eyes every 4 hours as needed (eye irritation)    DEXTROMETHORPHAN-GUAIFENESIN 5-100 MG/5ML LIQD    Take 5 mLs by mouth every 8 hours as needed (congestion, cough)    METHYLPREDNISOLONE (MEDROL, HARI,) 4 MG TABLET    Take by mouth. METOPROLOL SUCCINATE (TOPROL XL) 25 MG EXTENDED RELEASE TABLET    Take 1 tablet by mouth daily    ONDANSETRON (ZOFRAN) 4 MG TABLET    Take 1 tablet by mouth every 8 hours as needed for Nausea       ALLERGIES     Phenergan [promethazine hcl] and Toradol [ketorolac tromethamine]    FAMILY HISTORY     No family history on file. SOCIAL HISTORY       Social History     Socioeconomic History    Marital status:    Tobacco Use    Smoking status: Never    Smokeless tobacco: Never   Substance and Sexual Activity    Alcohol use: Not Currently     Comment: occasionally     Drug use: No       SCREENINGS    Bowen Coma Scale  Eye Opening: Spontaneous  Best Verbal Response: Oriented  Best Motor Response: Obeys commands  Abiodun Coma Scale Score: 15          PHYSICAL EXAM    (up to 7 for level 4, 8 or more for level 5)     ED Triage Vitals [10/07/22 0102]   BP Temp Temp Source Heart Rate Resp SpO2 Height Weight   (!) 140/99 97.7 °F (36.5 °C) Oral (!) 110 18 98 % -- --       Physical Exam  Vitals reviewed. HENT:      Head: Normocephalic and atraumatic. Nose: No congestion or rhinorrhea.       Mouth/Throat:      Mouth: Mucous membranes are moist.   Eyes:      General:         Right eye: No discharge. Left eye: No discharge. Extraocular Movements: Extraocular movements intact. Pupils: Pupils are equal, round, and reactive to light. Cardiovascular:      Rate and Rhythm: Normal rate. Heart sounds: No friction rub. No gallop. Pulmonary:      Effort: Pulmonary effort is normal.      Breath sounds: No stridor. No rhonchi. Abdominal:      Palpations: Abdomen is soft. Tenderness: There is no abdominal tenderness. There is no guarding. Musculoskeletal:         General: Tenderness present. Normal range of motion. Cervical back: Normal range of motion and neck supple. No tenderness. Comments: Low back and cocygeal tenderness; no midline deformity or step-off in lumbar or thoracic spine   Skin:     General: Skin is warm. Capillary Refill: Capillary refill takes less than 2 seconds. Neurological:      General: No focal deficit present. Mental Status: She is alert. DIAGNOSTIC RESULTS     Labs Reviewed   POCT URINE PREGNANCY - Normal          RADIOLOGY:     Non-plain film images such as CT, Ultrasound and MRI are read by the radiologist. Plain radiographic images are visualized and preliminarily interpreted by the emergency physician. Interpretation per the Radiologist below, if available at the time of this note:    CT PELVIS WO CONTRAST Additional Contrast? None   Preliminary Result   No acute bony abnormality is seen in the pelvis. Mild bilateral hip and SI joint degenerative changes. CT LUMBAR SPINE WO CONTRAST   Preliminary Result   No acute fracture or traumatic malalignment is seen involving the lumbar   spine. Multilevel endplate and facet degenerative changes with moderate L3-L4 disc   height loss.                ED BEDSIDE ULTRASOUND:   Performed by ED Physician Be Euceda MD       LABS:  Labs Reviewed   POCT URINE PREGNANCY - Normal All other labs were within normal range or not returned as of this dictation. EMERGENCY DEPARTMENT COURSE and DIFFERENTIAL DIAGNOSIS/MDM:   Vitals:    Vitals:    10/07/22 0102   BP: (!) 140/99   Pulse: (!) 110   Resp: 18   Temp: 97.7 °F (36.5 °C)   TempSrc: Oral   SpO2: 98%           MDM  Number of Diagnoses or Management Options  Sacral pain  Diagnosis management comments: 72-year-old female presents with progressive sacral back pain. She had a mechanical fall a couple of days ago. She was seen emergency department after the fall did undergo a lumbar x-ray that was unremarkable. She does endorse some progressive pain with ambulation. She is tried anti-inflammatories at home with incomplete leaf of symptoms. She does endorse a history of \"back surgery. \"  She is not have any saddle paresthesias pain or bowel or urinary incontinence. No fevers or chills. No red flag symptoms. She presents with elevated blood pressure. She is no tachycardia. Vitals otherwise unremarkable. She does have reproducible sacral pain. No obvious deformity noted in lumbar thoracic spine or otherwise. Given the progression of her symptoms, I am obtaining CT of her lumbar spine as well as CT of her pelvis. She is treated with oral analgesics, anti-inflammatories and muscle relaxers in the emergency department. On reassessment her symptoms are improved. CT scans unremarkable. No acute fracture or traumatic injury noted. She is discharged home. She will continue symptom measures at home. She will follow-up outpatient as needed. She is ambulatory without difficulty. She is discharged in stable condition with return precautions. -  Patient seen and evaluated in the emergency department. -  Triage and nursing notes reviewed and incorporated. -  Old chart records reviewed and incorporated. -  Work-up included:  See above  -  Results discussed with patient.   CONSULTS:  None    PROCEDURES:  None performed unless otherwise noted below     Procedures        FINAL IMPRESSION      1. Sacral pain          DISPOSITION/PLAN   DISPOSITION Decision To Discharge 10/07/2022 04:02:59 AM      PATIENT REFERRED TO:  Akin Diaz MD  [facility] [id number]  [id number]  [address] [id number]  [phone]      As needed    DISCHARGE MEDICATIONS:  New Prescriptions    HYDROCODONE-ACETAMINOPHEN (NORCO) 5-325 MG PER TABLET    Take 1 tablet by mouth every 8 hours as needed for Pain for up to 2 days. Intended supply: 3 days. Take lowest dose possible to manage pain    IBUPROFEN (ADVIL;MOTRIN) 600 MG TABLET    Take 1 tablet by mouth 3 times daily as needed for Pain    LIDOCAINE (LIDODERM) 5 %    Place 1 patch onto the skin daily for 10 days 12 hours on, 12 hours off. METHOCARBAMOL (ROBAXIN) 500 MG TABLET    Take 1 tablet by mouth 4 times daily as needed (for pain/spasms)       ED Provider Disposition Time  DISPOSITION Decision To Discharge 10/07/2022 04:02:59 AM      Appropriate personal protective equipment was worn during the patient's evaluation. These included surgical, eye protection, surgical mask or in 95 respirator and gloves. The patient was also placed in a surgical mask for the prevention of possible spread of respiratory viral illnesses. The Patient was instructed to read the package inserts with any medication that was prescribed. Major potential reactions and medication interactions were discussed. The Patient understands that there are numerous possible adverse reactions not covered. The patient was also instructed to arrange follow-up with his or her primary care provider for review of any pending labwork or incidental findings on any radiology results that were obtained. All efforts were made to discuss any incidental findings that require further monitoring. Controlled Substances Monitoring:     No flowsheet data found.     (Please note that portions of this note were completed with a voice recognition program.  Efforts were made to edit the dictations but occasionally words are mis-transcribed.)    Lorene Panchal MD (electronically signed)  Attending Emergency Physician            Lorene Panchal MD  10/07/22 [id number]

## 2023-01-02 ENCOUNTER — APPOINTMENT (OUTPATIENT)
Dept: CT IMAGING | Age: 43
End: 2023-01-02
Payer: COMMERCIAL

## 2023-01-02 ENCOUNTER — APPOINTMENT (OUTPATIENT)
Dept: GENERAL RADIOLOGY | Age: 43
End: 2023-01-02
Payer: COMMERCIAL

## 2023-01-02 ENCOUNTER — HOSPITAL ENCOUNTER (EMERGENCY)
Age: 43
Discharge: HOME | End: 2023-01-02
Payer: COMMERCIAL

## 2023-01-02 VITALS
SYSTOLIC BLOOD PRESSURE: 119 MMHG | HEIGHT: 60 IN | BODY MASS INDEX: 37.69 KG/M2 | WEIGHT: 192 LBS | RESPIRATION RATE: 16 BRPM | OXYGEN SATURATION: 100 % | DIASTOLIC BLOOD PRESSURE: 87 MMHG | TEMPERATURE: 98.5 F | HEART RATE: 78 BPM

## 2023-01-02 DIAGNOSIS — S43.401A SPRAIN OF RIGHT SHOULDER, UNSPECIFIED SHOULDER SPRAIN TYPE, INITIAL ENCOUNTER: ICD-10-CM

## 2023-01-02 DIAGNOSIS — S16.1XXA STRAIN OF NECK MUSCLE, INITIAL ENCOUNTER: ICD-10-CM

## 2023-01-02 DIAGNOSIS — S39.012A STRAIN OF LUMBAR REGION, INITIAL ENCOUNTER: ICD-10-CM

## 2023-01-02 DIAGNOSIS — S40.011A CONTUSION OF RIGHT SHOULDER, INITIAL ENCOUNTER: Primary | ICD-10-CM

## 2023-01-02 PROCEDURE — 73030 X-RAY EXAM OF SHOULDER: CPT

## 2023-01-02 PROCEDURE — 72128 CT CHEST SPINE W/O DYE: CPT

## 2023-01-02 PROCEDURE — 6370000000 HC RX 637 (ALT 250 FOR IP): Performed by: NURSE PRACTITIONER

## 2023-01-02 PROCEDURE — 99284 EMERGENCY DEPT VISIT MOD MDM: CPT

## 2023-01-02 PROCEDURE — 72131 CT LUMBAR SPINE W/O DYE: CPT

## 2023-01-02 PROCEDURE — 72125 CT NECK SPINE W/O DYE: CPT

## 2023-01-02 RX ORDER — OXYCODONE HYDROCHLORIDE AND ACETAMINOPHEN 5; 325 MG/1; MG/1
1 TABLET ORAL ONCE
Status: COMPLETED | OUTPATIENT
Start: 2023-01-02 | End: 2023-01-02

## 2023-01-02 RX ORDER — NAPROXEN 500 MG/1
500 TABLET ORAL 2 TIMES DAILY
Qty: 60 TABLET | Refills: 0 | Status: SHIPPED | OUTPATIENT
Start: 2023-01-02 | End: 2023-01-02 | Stop reason: SDUPTHER

## 2023-01-02 RX ORDER — CYCLOBENZAPRINE HCL 10 MG
10 TABLET ORAL 3 TIMES DAILY PRN
Qty: 21 TABLET | Refills: 0 | Status: SHIPPED | OUTPATIENT
Start: 2023-01-02 | End: 2023-01-12

## 2023-01-02 RX ORDER — HYDROCODONE BITARTRATE AND ACETAMINOPHEN 5; 325 MG/1; MG/1
1 TABLET ORAL EVERY 4 HOURS PRN
Qty: 18 TABLET | Refills: 0 | Status: SHIPPED | OUTPATIENT
Start: 2023-01-02 | End: 2023-01-02 | Stop reason: SDUPTHER

## 2023-01-02 RX ORDER — CYCLOBENZAPRINE HCL 10 MG
10 TABLET ORAL 3 TIMES DAILY PRN
Qty: 21 TABLET | Refills: 0 | Status: SHIPPED | OUTPATIENT
Start: 2023-01-02 | End: 2023-01-02 | Stop reason: SDUPTHER

## 2023-01-02 RX ORDER — NAPROXEN 500 MG/1
500 TABLET ORAL 2 TIMES DAILY
Qty: 60 TABLET | Refills: 0 | Status: SHIPPED | OUTPATIENT
Start: 2023-01-02

## 2023-01-02 RX ORDER — CYCLOBENZAPRINE HCL 10 MG
10 TABLET ORAL ONCE
Status: COMPLETED | OUTPATIENT
Start: 2023-01-02 | End: 2023-01-02

## 2023-01-02 RX ORDER — HYDROCODONE BITARTRATE AND ACETAMINOPHEN 5; 325 MG/1; MG/1
1 TABLET ORAL EVERY 4 HOURS PRN
Qty: 18 TABLET | Refills: 0 | Status: SHIPPED | OUTPATIENT
Start: 2023-01-02 | End: 2023-01-05

## 2023-01-02 RX ADMIN — CYCLOBENZAPRINE 10 MG: 10 TABLET, FILM COATED ORAL at 14:09

## 2023-01-02 RX ADMIN — OXYCODONE HYDROCHLORIDE AND ACETAMINOPHEN 1 TABLET: 5; 325 TABLET ORAL at 14:09

## 2023-01-02 NOTE — Clinical Note
Barb Joy was seen and treated in our emergency department on 1/2/2023. She may return to work on 01/06/2023. If you have any questions or concerns, please don't hesitate to call.       Camille Gilmore, APRN - CNP

## 2023-01-02 NOTE — Clinical Note
Deb Mahoney was seen and treated in our emergency department on 1/2/2023. She may return to work on 01/06/2023. If you have any questions or concerns, please don't hesitate to call.       Rodolfo Marroquin, ALONSO - CNP

## 2023-01-02 NOTE — ED PROVIDER NOTES
[address] ENCOUNTER        Pt Name: Emi Jenkins  MRN: [id number]  [place] 1980  Date of evaluation: 1/2/2023  Provider: ALONSO Whitley - CNP  PCP: Caridad Ramos MD    VICK. I have evaluated this patient. My supervising physician was available for consultation. Triage CHIEF COMPLAINT       Chief Complaint   Patient presents with    Shoulder Injury     Right shoulder injury. Occurred at work while trying to transfer a pt         HISTORY OF PRESENT ILLNESS      Chief Complaint: Right shoulder, neck, and back pain    Emi Jenkins is a 38 y.o. female who presents who presents for evaluation of right shoulder pain as well as neck and mid to lower back pain. 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 Patient has a history of cervical disease and had spinal surgery earlier this year. She reports an increase in neck pain as well as mid to low back pain. She denies any radicular symptoms in her upper or lower extremities, numbness or paresthesias. She has not been any shortness of breath. She did not take any medication prior to arrival.    Nursing Notes were all reviewed and agreed with or any disagreements were addressed in the HPI.     REVIEW OF SYSTEMS     Cardiovascular:   Denies chest pain  Respiratory:  Denies cough or shortness of breath    GI:   Denies abdominal pain, nausea, vomiting, or diarrhea  Musculoskeletal: See HPI  Skin:  + Ecchymosis right shoulder  Neurologic: Denies sensory changes upper or lower extremities  PAST MEDICAL HISTORY     Past Medical History:   Diagnosis Date    Anxiety     Headache(784.0)     Hypertension     Migraine     UTI (urinary tract infection)        SURGICAL HISTORY     Past Surgical History:   Procedure Laterality Date    BACK SURGERY      TUBAL LIGATION         CURRENTMEDICATIONS       Current Discharge Medication List        CONTINUE these medications which have NOT CHANGED    Details   ibuprofen (ADVIL;MOTRIN) 600 MG tablet Take 1 tablet by mouth 3 times daily as needed for Pain  Qty: 30 tablet, Refills: 0      ondansetron (ZOFRAN) 4 MG tablet Take 1 tablet by mouth every 8 hours as needed for Nausea  Qty: 10 tablet, Refills: 0      Dextromethorphan-guaiFENesin 5-100 MG/5ML LIQD Take 5 mLs by mouth every 8 hours as needed (congestion, cough)  Qty: 1 Bottle, Refills: 0      methylPREDNISolone (MEDROL, HARI,) 4 MG tablet Take by mouth. Qty: 1 kit, Refills: 0      dextran 70-hypromellose (ARTIFICIAL TEARS) 0.1-0.3 % SOLN opthalmic solution Place 1 drop into both eyes every 4 hours as needed (eye irritation)  Qty: 1 Bottle, Refills: 2      metoprolol succinate (TOPROL XL) 25 MG extended release tablet Take 1 tablet by mouth daily  Qty: 30 tablet, Refills: 0             ALLERGIES     Phenergan [promethazine hcl] and Toradol [ketorolac tromethamine]    FAMILYHISTORY     History reviewed. No pertinent family history. SOCIAL HISTORY       Social History     Socioeconomic History    Marital status:      Spouse name: None    Number of children: None    Years of education: None    Highest education level: None   Tobacco Use    Smoking status: Never    Smokeless tobacco: Never   Substance and Sexual Activity    Alcohol use: Not Currently     Comment: occasionally     Drug use: No       SCREENINGS           PHYSICAL EXAM       ED Triage Vitals [01/02/23 1254]   BP Temp Temp Source Heart Rate Resp SpO2 Height Weight   119/87 98.5 °F (36.9 °C) Oral 78 16 100 % 5' (1.524 m) 192 lb (87.1 kg)      Constitutional:  Well developed, Well nourished. Moderate painful distress  HENT:  Normocephalic, Atraumatic  Cardiovascular:   RRR,  no murmurs/rubs/gallops.   Distal cap refill and pulses intact bilateral upper and lower extremities. no peripheral edema. Respiratory:   Nonlabored breathing. Normal breath sounds, No wheezing  Musculoskeletal: Right anterior shoulder with large area of ecchymosis and mild edema. There is tenderness near the right [facility] joint as well as over the proximal humerus. Range of motion is reduced in the shoulder in all directions secondary to pain. No cervical midline tenderness. There is paracervical tenderness on the right extending into the trapezius and parascapular area. No midline thoracic or lumbar spinal tenderness. There is increased paravertebral tenderness along the lower thoracic as well as into the lumbar area. Negative straight leg raise bilaterally. Normal strength bilateral upper and lower extremities. Integument:   Warm, Dry, No rashes. Neurologic: Sensation intact bilateral upper and lower extremities. Psychiatric:  Affect normal, Mood normal.     DIAGNOSTIC RESULTS   LABS:    Labs Reviewed - No data to display    When ordered, only abnormal lab results are displayed. All other labs were within normal range or not returned as of this dictation. EKG: When ordered, EKG's are interpreted by the Emergency Department Physician in the absence of a cardiologist.  Please see their note for interpretation of EKG. RADIOLOGY:   Non-plain film images such as CT, Ultrasound and MRI are read by the radiologist. Plain radiographic images are visualized and preliminarily interpreted by the  ED Provider with the below findings:    Interpretation perthe Radiologist below, if available at the time of this note:    CT LUMBAR SPINE WO CONTRAST   Final Result   No acute lumbar spine abnormality      Multilevel degenerative changes         CT THORACIC SPINE WO CONTRAST   Final Result   No acute osseous abnormality. CT CERVICAL SPINE WO CONTRAST   Final Result   No acute abnormality of the cervical spine. XR SHOULDER RIGHT (MIN 2 VIEWS)   Final Result   No acute abnormality. XR SHOULDER RIGHT (MIN 2 VIEWS)    Result Date: 1/2/2023  EXAMINATION: 3 XRAY VIEWS OF THE RIGHT SHOULDER 1/2/2023 2:23 pm COMPARISON: None. HISTORY: ORDERING SYSTEM PROVIDED HISTORY: work injury, 400 pound resident fell on patient. TECHNOLOGIST PROVIDED HISTORY: Reason for exam:->work injury, 400 pound resident fell on patient. Reason for Exam: work injury, 400 lb resident fell on patient Additional signs and symptoms: na Relevant Medical/Surgical History: na FINDINGS: Glenohumeral joint is normally aligned. No evidence of acute fracture or dislocation. No abnormal periarticular calcifications. The [facility] joint is unremarkable in appearance. Visualized lung is unremarkable. No acute abnormality. CT CERVICAL SPINE WO CONTRAST    Result Date: 1/2/2023  EXAMINATION: CT OF THE CERVICAL SPINE WITHOUT CONTRAST 1/2/2023 3:05 pm TECHNIQUE: CT of the cervical spine was performed without the administration of intravenous contrast. Multiplanar reformatted images are provided for review. Automated exposure control, iterative reconstruction, and/or weight based adjustment of the mA/kV was utilized to reduce the radiation dose to as low as reasonably achievable. COMPARISON: None. HISTORY: ORDERING SYSTEM PROVIDED HISTORY: work injury, 400 pound resident fell on patient. TECHNOLOGIST PROVIDED HISTORY: Reason for exam:->work injury, 400 pound resident fell on patient. Decision Support Exception - unselect if not a suspected or confirmed emergency medical condition->Emergency Medical Condition (MA) Is the patient pregnant?->No Reason for Exam: work injury, 400 pound resident fell on patient. FINDINGS: BONES/ALIGNMENT: There is no acute fracture or traumatic malalignment. Previous disc space fusion at C4-5 and C5-6. DEGENERATIVE CHANGES: Multilevel degenerative changes. SOFT TISSUES: There is no prevertebral soft tissue swelling. No acute abnormality of the cervical spine.      CT THORACIC SPINE WO CONTRAST    Result Date: 1/2/2023  EXAMINATION: CT OF THE THORACIC SPINE WITHOUT CONTRAST  1/2/2023 3:05 pm: TECHNIQUE: CT of the thoracic spine was performed without the administration of intravenous contrast. Multiplanar reformatted images are provided for review. Automated exposure control, iterative reconstruction, and/or weight based adjustment of the mA/kV was utilized to reduce the radiation dose to as low as reasonably achievable. COMPARISON: 12/05/2009, 10/14/2019 the HISTORY: ORDERING SYSTEM PROVIDED HISTORY: work injury, 400 pound resident fell on patient. TECHNOLOGIST PROVIDED HISTORY: Reason for exam:->work injury, 400 pound resident fell on patient. Is the patient pregnant?->No Reason for Exam: work injury, 400 pound resident fell on patient. FINDINGS: BONES/ALIGNMENT: There is normal alignment of the spine. The vertebral body heights are maintained. No osseous destructive lesion is seen. DEGENERATIVE CHANGES: Mild degenerative change. SOFT TISSUES: No paraspinal mass is seen. No acute osseous abnormality. CT LUMBAR SPINE WO CONTRAST    Result Date: 1/2/2023  EXAMINATION: CT OF THE LUMBAR SPINE WITHOUT CONTRAST  1/2/2023 TECHNIQUE: CT of the lumbar spine was performed without the administration of intravenous contrast. Multiplanar reformatted images are provided for review. Adjustment of mA and/or kV according to patient size was utilized. Automated exposure control, iterative reconstruction, and/or weight based adjustment of the mA/kV was utilized to reduce the radiation dose to as low as reasonably achievable. COMPARISON: 03/13/2019 HISTORY: ORDERING SYSTEM PROVIDED HISTORY: work injury, 400 pound resident fell on patient. TECHNOLOGIST PROVIDED HISTORY: Reason for exam:->work injury, 400 pound resident fell on patient.  Decision Support Exception - unselect if not a suspected or confirmed emergency medical condition->Emergency Medical Condition (MA) Is the patient pregnant?->No Reason for Exam: work injury, 400 pound resident fell on patient. FINDINGS: BONES/ALIGNMENT: There is normal alignment of the spine. The vertebral body heights are maintained. No osseous destructive lesion is seen. DEGENERATIVE CHANGES: Multilevel degenerative disc disease and facet joint arthropathy. SOFT TISSUES/RETROPERITONEUM: No paraspinal mass is seen. No acute lumbar spine abnormality Multilevel degenerative changes         PROCEDURES   Unless otherwise noted below, none         CRITICAL CARE   CRITICAL CARE NOTE:   N/A    CONSULTS:  None      EMERGENCY DEPARTMENT COURSE and MDM:   Vitals:    Vitals:    01/02/23 1254   BP: 119/87   Pulse: 78   Resp: 16   Temp: 98.5 °F (36.9 °C)   TempSrc: Oral   SpO2: 100%   Weight: 192 lb (87.1 kg)   Height: 5' (1.524 m)       Patient was given thefollowing medications:  Medications   oxyCODONE-acetaminophen (PERCOCET) 5-325 MG per tablet 1 tablet (1 tablet Oral Given 1/2/23 1409)   cyclobenzaprine (FLEXERIL) tablet 10 mg (10 mg Oral Given 1/2/23 1409)           Sepsis Consideration    Exclusion criteria - the patient is NOT to be included for SEP-1 Core Measure due to: Infection is not suspected      MDM:  Patient presents as above. Emergent etiologies considered. Patient seen and examined. Work-up initiated secondary to presentation, physical exam findings, vital signs and medical chart review. Juvencio Ervin CNP, am the primary clinician of record. In brief, patient presents for evaluation of right shoulder pain as well as neck and mid to lower back pain after work-related injury. On exam, the patient has a contusion over the anterior right shoulder as well as tenderness over the [facility] joint and the proximal humerus. There is no midline cervical, thoracic, or lumbar spinal tenderness but there is diffuse paravertebral tenderness along the right side. Neurologically she is intact. There is no evidence of cauda equina.   an x-ray of the right shoulder revealed no acute osseous abnormality. A CT scan of the C-spine, T-spine, and L-spine showed no acute osseous abnormalities. Discussed results with patient. She was given a dose of Norco here for pain relief. Will discharge home on anti-inflammatories as well as muscle relaxers and Norco.  Encouraged her to follow-up with orthopedics if her shoulder pain has not improved in the next 3 to 4 days to rule out ligamentous injury. Work excuse provided to the rest of the week. Patient verbalized understanding and agreement and feels comfortable discharge at this time. CLINICAL IMPRESSION      1. Contusion of right shoulder, initial encounter    2. Sprain of right shoulder, unspecified shoulder sprain type, initial encounter    3. Strain of neck muscle, initial encounter    4. Strain of lumbar region, initial encounter          DISPOSITION/PLAN   DISPOSITION Decision To Discharge 01/02/2023 03:55:37 PM      PATIENT REFERREDTO:  Dawood Del Angel MD  [facility] [address]  [id number]  [phone]      2-3 days    Adria Fajardo MD  [address] [facility] [address] [facility]  [phone]      Call if symptoms not improving in 4-5 days      DISCHARGE MEDICATIONS:  Current Discharge Medication List        START taking these medications    Details   cyclobenzaprine (FLEXERIL) 10 MG tablet Take 1 tablet by mouth 3 times daily as needed for Muscle spasms  Qty: 21 tablet, Refills: 0      HYDROcodone-acetaminophen (NORCO) 5-325 MG per tablet Take 1 tablet by mouth every 4 hours as needed for Pain for up to 3 days. Intended supply: 3 days.  Take lowest dose possible to manage pain Max Daily Amount: 6 tablets  Qty: 18 tablet, Refills: 0    Associated Diagnoses: Contusion of right shoulder, initial encounter; Sprain of right shoulder, unspecified shoulder sprain type, initial encounter; Strain of neck muscle, initial encounter; Strain of lumbar region, initial encounter      naproxen (NAPROSYN) 500 MG tablet Take 1 tablet by mouth 2 times daily  Qty: 60 tablet, Refills: 0             DISCONTINUED MEDICATIONS:  Current Discharge Medication List                 (Please note that portions ofthis note were completed with a voice recognition program.  Efforts were made to edit the dictations but occasionally words are mis-transcribed.)    ALONSO Fajardo CNP (electronically signed)             ALONSO Hawk CNP  01/02/23 5807

## 2023-01-13 ENCOUNTER — TELEPHONE (OUTPATIENT)
Dept: ORTHOPEDIC SURGERY | Age: 43
End: 2023-01-13

## 2023-01-13 ENCOUNTER — OFFICE VISIT (OUTPATIENT)
Dept: ORTHOPEDIC SURGERY | Age: 43
End: 2023-01-13

## 2023-01-13 VITALS
WEIGHT: 192.3 LBS | HEART RATE: 95 BPM | OXYGEN SATURATION: 98 % | RESPIRATION RATE: 16 BRPM | HEIGHT: 60 IN | BODY MASS INDEX: 37.76 KG/M2

## 2023-01-13 DIAGNOSIS — S40.011A CONTUSION OF RIGHT SHOULDER, INITIAL ENCOUNTER: Primary | ICD-10-CM

## 2023-01-13 NOTE — PATIENT INSTRUCTIONS
Recommend off work until MRI completed. Sling given  Wear sling for the next 2 weeks but remove periodically for range of motion of the elbow and the wrist.  Avoid any range of motion of the shoulder above your head. Ice and elevate as needed  Follow-up in 2 weeks for reevaluation and if at that time no better will consider MRI. We are committed to providing you the best care possible. If you receive a survey after visiting one of our offices, please take time to share your experience concerning your physician office visit. These surveys are confidential and no health information about you is shared. We are eager to improve for you and we are counting on your feedback to help make that happen.

## 2023-01-13 NOTE — PROGRESS NOTES
[facility]      HPI:  Omar Mast is a 38 y.o. female that presents the office today for evaluation of a right shoulder injury that she sustained while at her workplace as an [employer]. 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 She has significant pain over the top part of her shoulder worse with any range of motion of the shoulder. She has had limited range of motion of the right shoulder and has had pain since the injury. She did have it evaluated in the emergency department. She rates her pain today at a 9/10 with attempted motion. She is right-hand dominant. She has not been wearing a sling. I reviewed and agree with the portions of the HPI, review of systems, vital documentation and plan performed by my staff and have added/addended where appropriate. Past Medical History:   Diagnosis Date    Anxiety     Headache(784.0)     Hypertension     Migraine     UTI (urinary tract infection)        Past Surgical History:   Procedure Laterality Date    BACK SURGERY      TUBAL LIGATION         No family history on file.     Social History     Socioeconomic History    Marital status:      Spouse name: None    Number of children: None    Years of education: None    Highest education level: None   Tobacco Use    Smoking status: Never    Smokeless tobacco: Never   Substance and Sexual Activity    Alcohol use: Not Currently     Comment: occasionally     Drug use: No       Current Outpatient Medications   Medication Sig Dispense Refill    naproxen (NAPROSYN) 500 MG tablet Take 1 tablet by mouth 2 times daily 60 tablet 0    ibuprofen (ADVIL;MOTRIN) 600 MG tablet Take 1 tablet by mouth 3 times daily as needed for Pain 30 tablet 0    ondansetron (ZOFRAN) 4 MG tablet Take 1 tablet by mouth every 8 hours as needed for Nausea 10 tablet 0    Dextromethorphan-guaiFENesin 5-100 MG/5ML LIQD Take 5 mLs by mouth every 8 hours as needed (congestion, cough) 1 Bottle 0    methylPREDNISolone (MEDROL, HARI,) 4 MG tablet Take by mouth. 1 kit 0    dextran 70-hypromellose (ARTIFICIAL TEARS) 0.1-0.3 % SOLN opthalmic solution Place 1 drop into both eyes every 4 hours as needed (eye irritation) 1 Bottle 2    metoprolol succinate (TOPROL XL) 25 MG extended release tablet Take 1 tablet by mouth daily 30 tablet 0     No current facility-administered medications for this visit. Allergies   Allergen Reactions    Phenergan [Promethazine Hcl] Other (See Comments)     \"everything happens, I get anxious\"    Toradol [Ketorolac Tromethamine] Other (See Comments)     \"makes my vagina burn\"       Vitals:    01/13/23 0813   Pulse: 95   Resp: 16   SpO2: 98%   Weight: 192 lb 4.8 oz (87.2 kg)   Height: 5' (1.524 m)         Review of Systems:   Review of Systems   Constitutional: Negative. HENT: Negative. Eyes: Negative. Respiratory: Negative. Cardiovascular: Negative. Gastrointestinal: Negative. Genitourinary: Negative. Musculoskeletal:  Positive for arthralgias. Skin: Negative. Neurological: Negative. Psychiatric/Behavioral: Negative. Physical Exam:   Gen/Psych:Examination reveals a pleasant individual in no acute distress. The patient is oriented to time, place and person. The patient's mood and affect are appropriate. Patient appears well nourished. Body habitus is overweight    HEENT: Head is atraumatic normocephalic, ears are symmetric, eyes show equal pupils bilaterally, extraocular muscles intact. Hearing is intact to normal voice at 5 feet. Nares are patent bilaterally, no epistaxis, no rhinorrhea. Lymph: No obvious lymphedema in bilateral upper extremities     Skin: Intact in bilateral upper extremities with no ulcerations, lesions, rash, erythema. Vascular:  There are no varicosities in bilateral upper extremities, sensation intact to light touch over bilateral upper extremities. Musculoskeletal:  Right shoulder exam:  Skin:  Clear with no erythema, there is no significant joint effusion  Deformity:  none  Atrophy:  none  Tenderness: Distal clavicle, acromion, AC joint  Active ROM:   FE:50    IR side: sacrum           ER side: 10   Ad/Abduction: 40    Passive range of motion of the right shoulder shows she has significant pain with forward elevation of the shoulder although she can get 150 degrees of forward elevation. Strength is 4/5    Cervical Spine tenderness: no  Imaging: X-rays reviewed which showed no obvious fractures or dislocations. There does appear to be a small ossific density just inferior to the acromion at the [facility] joint which is of undetermined significance. Assessment:    Diagnosis Orders   1. Contusion of right shoulder, initial encounter      Possible AC joint sprain            Plan:   Patient Instructions   Recommend off work until MRI completed. Sling given  Wear sling for the next 2 weeks but remove periodically for range of motion of the elbow and the wrist.  Avoid any range of motion of the shoulder above your head. Ice and elevate as needed  Follow-up in 2 weeks for reevaluation and if at that time no better will consider MRI. We are committed to providing you the best care possible. If you receive a survey after visiting one of our offices, please take time to share your experience concerning your physician office visit. These surveys are confidential and no health information about you is shared. We are eager to improve for you and we are counting on your feedback to help make that happen. *Please note this report has been partially produced using speech recognition Dragon software and may contain errors related to that system including errors in grammar, punctuation, and spelling, as well as words and phrases that may be inappropriate.  If there are any questions or concerns please feel free to contact the dictating provider for clarification              Impression   No acute abnormality.

## 2023-01-15 ASSESSMENT — ENCOUNTER SYMPTOMS
EYES NEGATIVE: 1
RESPIRATORY NEGATIVE: 1
GASTROINTESTINAL NEGATIVE: 1

## 2023-01-19 NOTE — TELEPHONE ENCOUNTER
[address] completed and faxed with supporting office note to [facility]. MRI will be considered at next visit pending patient symptoms.

## 2023-01-30 ENCOUNTER — OFFICE VISIT (OUTPATIENT)
Dept: ORTHOPEDIC SURGERY | Age: 43
End: 2023-01-30
Payer: COMMERCIAL

## 2023-01-30 VITALS
HEIGHT: 60 IN | BODY MASS INDEX: 42.09 KG/M2 | HEART RATE: 85 BPM | OXYGEN SATURATION: 97 % | WEIGHT: 214.4 LBS | RESPIRATION RATE: 16 BRPM

## 2023-01-30 DIAGNOSIS — S40.011A CONTUSION OF RIGHT SHOULDER, INITIAL ENCOUNTER: Primary | ICD-10-CM

## 2023-01-30 PROCEDURE — G8417 CALC BMI ABV UP PARAM F/U: HCPCS | Performed by: PHYSICIAN ASSISTANT

## 2023-01-30 PROCEDURE — 99212 OFFICE O/P EST SF 10 MIN: CPT | Performed by: PHYSICIAN ASSISTANT

## 2023-01-30 PROCEDURE — G8427 DOCREV CUR MEDS BY ELIG CLIN: HCPCS | Performed by: PHYSICIAN ASSISTANT

## 2023-01-30 PROCEDURE — 1036F TOBACCO NON-USER: CPT | Performed by: PHYSICIAN ASSISTANT

## 2023-01-30 PROCEDURE — G8484 FLU IMMUNIZE NO ADMIN: HCPCS | Performed by: PHYSICIAN ASSISTANT

## 2023-01-30 ASSESSMENT — ENCOUNTER SYMPTOMS
RESPIRATORY NEGATIVE: 1
EYES NEGATIVE: 1
GASTROINTESTINAL NEGATIVE: 1

## 2023-01-30 NOTE — PATIENT INSTRUCTIONS
We will send for approval for physical therapy through [facility]. May work on passive range of motion of the right arm for now but no lifting pushing or pulling with the right arm greater than the weight of the arm. Follow-up in 4-5 weeks to evaluate response to treatment. If no relief then at that point then an MRI will be ordered.

## 2023-01-30 NOTE — PROGRESS NOTES
[facility]      HPI:  Gary Maharaj is a 38 y.o. female that presents to the office today complaining of continued pain in the right shoulder which has mildly improved but still causes significant deficits to motion. Pain is still over the superior portion of the shoulder and she states it is like a burning pain that will radiate down that shoulder a little bit. She has use the sling as instructed. Pain is still 8/10 today. I reviewed and agree with the portions of the HPI, review of systems, vital documentation and plan performed by my staff and have added/addended where appropriate. Past Medical History:   Diagnosis Date    Anxiety     Headache(784.0)     Hypertension     Migraine     UTI (urinary tract infection)        Past Surgical History:   Procedure Laterality Date    BACK SURGERY      TUBAL LIGATION         No family history on file. Social History     Socioeconomic History    Marital status:    Tobacco Use    Smoking status: Never    Smokeless tobacco: Never   Substance and Sexual Activity    Alcohol use: Not Currently     Comment: occasionally     Drug use: No       Current Outpatient Medications   Medication Sig Dispense Refill    naproxen (NAPROSYN) 500 MG tablet Take 1 tablet by mouth 2 times daily 60 tablet 0    ibuprofen (ADVIL;MOTRIN) 600 MG tablet Take 1 tablet by mouth 3 times daily as needed for Pain 30 tablet 0    ondansetron (ZOFRAN) 4 MG tablet Take 1 tablet by mouth every 8 hours as needed for Nausea 10 tablet 0    Dextromethorphan-guaiFENesin 5-100 MG/5ML LIQD Take 5 mLs by mouth every 8 hours as needed (congestion, cough) 1 Bottle 0    methylPREDNISolone (MEDROL, HARI,) 4 MG tablet Take by mouth.  1 kit 0    dextran 70-hypromellose (ARTIFICIAL TEARS) 0.1-0.3 % SOLN opthalmic solution Place 1 drop into both eyes every 4 hours as needed (eye irritation) 1 Bottle 2    metoprolol succinate (TOPROL XL) 25 MG extended release tablet Take 1 tablet by mouth daily 30 tablet 0     No current facility-administered medications for this visit. Allergies   Allergen Reactions    Phenergan [Promethazine Hcl] Other (See Comments)     \"everything happens, I get anxious\"    Toradol [Ketorolac Tromethamine] Other (See Comments)     \"makes my vagina burn\"       Vitals:    01/30/23 0823   Pulse: 85   Resp: 16   SpO2: 97%   Weight: 214 lb 6.4 oz (97.3 kg)   Height: 5' (1.524 m)         Review of Systems:   Review of Systems   Constitutional: Negative. HENT: Negative. Eyes: Negative. Respiratory: Negative. Cardiovascular: Negative. Gastrointestinal: Negative. Genitourinary: Negative. Musculoskeletal:  Positive for arthralgias. Skin: Negative. Neurological: Negative. Psychiatric/Behavioral: Negative. Physical Exam:   Gen/Psych:Examination reveals a pleasant individual in no acute distress. The patient is oriented to time, place and person. The patient's mood and affect are appropriate. Patient appears well nourished. Body habitus is overweight    HEENT: Head is atraumatic normocephalic, ears are symmetric, eyes show equal pupils bilaterally, extraocular muscles intact. Hearing is intact to normal voice at 5 feet. Nares are patent bilaterally, no epistaxis, no rhinorrhea. Lymph: No obvious lymphedema in bilateral upper extremities     Skin: Intact in bilateral upper extremities with no ulcerations, lesions, rash, erythema. Vascular: There are no varicosities in bilateral upper  extremities, sensation intact to light touch over bilateral upper extremities.     Musculoskeletal:  Right shoulder exam:  Skin:  Clear with no erythema, there is no significant joint effusion  Deformity:  none  Atrophy:  none  Tenderness: Distal clavicle, acromion, AC joint  Active ROM:   FE:90    IR side: sacrum           ER side: 10   Ad/Abduction: 40    Passive range of motion of the right shoulder shows she has significant pain with forward elevation of the shoulder although she can get 150 degrees of forward elevation.  Strength is 4/5    Cervical Spine tenderness: no    Imaging: X-rays reviewed which showed no obvious fractures or dislocations.  There does appear to be a small ossific density just inferior to the acromion at the AC joint which is of undetermined significance.    Assessment:     ICD-10-CM    1. Contusion of right shoulder, initial encounter  S40.011A     AC joint sprain possibly                Plan:   Patient Instructions   We will send for approval for physical therapy through [facility].  May work on passive range of motion of the right arm for now but no lifting pushing or pulling with the right arm greater than the weight of the arm.  Follow-up in 4-5 weeks to evaluate response to treatment.  If no relief then at that point then an MRI will be ordered.          *Please note this report has been partially produced using speech recognition Dragon software and may contain errors related to that system including errors in grammar, punctuation, and spelling, as well as words and phrases that may be inappropriate. If there are any questions or concerns please feel free to contact the dictating provider for clarification              Impression   No acute abnormality.

## 2023-01-30 NOTE — PROGRESS NOTES
Christi Cleaning returns to the office for continued care of her right shoulder injury. She states that she has been utilizing the sling provided. She states that she still feels weakness and loss of ROM in the shoulder. She states that she has been treating the pain with the medication provided but is out of the muscle relaxer.  Pt rates the pain 8/10

## 2023-02-08 ENCOUNTER — OFFICE VISIT (OUTPATIENT)
Dept: ORTHOPEDIC SURGERY | Age: 43
End: 2023-02-08
Payer: COMMERCIAL

## 2023-02-08 VITALS
OXYGEN SATURATION: 97 % | HEIGHT: 60 IN | BODY MASS INDEX: 41.62 KG/M2 | RESPIRATION RATE: 16 BRPM | WEIGHT: 212 LBS | HEART RATE: 115 BPM

## 2023-02-08 DIAGNOSIS — S40.011A CONTUSION OF RIGHT SHOULDER, INITIAL ENCOUNTER: Primary | ICD-10-CM

## 2023-02-08 PROCEDURE — 1036F TOBACCO NON-USER: CPT | Performed by: PHYSICIAN ASSISTANT

## 2023-02-08 PROCEDURE — G8484 FLU IMMUNIZE NO ADMIN: HCPCS | Performed by: PHYSICIAN ASSISTANT

## 2023-02-08 PROCEDURE — 99212 OFFICE O/P EST SF 10 MIN: CPT | Performed by: PHYSICIAN ASSISTANT

## 2023-02-08 PROCEDURE — G8417 CALC BMI ABV UP PARAM F/U: HCPCS | Performed by: PHYSICIAN ASSISTANT

## 2023-02-08 PROCEDURE — G8427 DOCREV CUR MEDS BY ELIG CLIN: HCPCS | Performed by: PHYSICIAN ASSISTANT

## 2023-02-08 ASSESSMENT — ENCOUNTER SYMPTOMS
GASTROINTESTINAL NEGATIVE: 1
RESPIRATORY NEGATIVE: 1
EYES NEGATIVE: 1

## 2023-02-08 NOTE — PATIENT INSTRUCTIONS
Central Scheduling # [phone]  11 [facility] [phone]    MRI of Right Shoulder  Call office once MRI is completed or approved through [facility] as tolerated  Over-the-counter medication as needed for pain    We are committed to providing you the best care possible. If you receive a survey after visiting one of our offices, please take time to share your experience concerning your physician office visit. These surveys are confidential and no health information about you is shared. We are eager to improve for you and we are counting on your feedback to help make that happen.

## 2023-02-08 NOTE — PROGRESS NOTES
[facility]      HPI:  Davis Abdullahi is a 38 y.o. female that returns 2 weeks after I last saw her because of severe pain persistent in the right shoulder and weakness in the right arm. She states that even though she has not started physical therapy she was trying to do some things for herself just basic things for hygiene and she just continue to have severe pain. At this point she would like to see if we can get an MRI approved because she feels like something is seriously wrong with the shoulder. She is having to take muscle relaxer and Norco.  I reviewed and agree with the portions of the HPI, review of systems, vital documentation and plan performed by my staff and have added/addended where appropriate. Past Medical History:   Diagnosis Date    Anxiety     Headache(784.0)     Hypertension     Migraine     UTI (urinary tract infection)        Past Surgical History:   Procedure Laterality Date    BACK SURGERY      TUBAL LIGATION         No family history on file. Social History     Socioeconomic History    Marital status:      Spouse name: None    Number of children: None    Years of education: None    Highest education level: None   Tobacco Use    Smoking status: Never    Smokeless tobacco: Never   Substance and Sexual Activity    Alcohol use: Not Currently     Comment: occasionally     Drug use: No       Current Outpatient Medications   Medication Sig Dispense Refill    naproxen (NAPROSYN) 500 MG tablet Take 1 tablet by mouth 2 times daily 60 tablet 0    ibuprofen (ADVIL;MOTRIN) 600 MG tablet Take 1 tablet by mouth 3 times daily as needed for Pain 30 tablet 0    ondansetron (ZOFRAN) 4 MG tablet Take 1 tablet by mouth every 8 hours as needed for Nausea 10 tablet 0    Dextromethorphan-guaiFENesin 5-100 MG/5ML LIQD Take 5 mLs by mouth every 8 hours as needed (congestion, cough) 1 Bottle 0    methylPREDNISolone (MEDROL, HARI,) 4 MG tablet Take by mouth.  1 kit 0 dextran 70-hypromellose (ARTIFICIAL TEARS) 0.1-0.3 % SOLN opthalmic solution Place 1 drop into both eyes every 4 hours as needed (eye irritation) 1 Bottle 2    metoprolol succinate (TOPROL XL) 25 MG extended release tablet Take 1 tablet by mouth daily 30 tablet 0     No current facility-administered medications for this visit. Allergies   Allergen Reactions    Phenergan [Promethazine Hcl] Other (See Comments)     \"everything happens, I get anxious\"    Toradol [Ketorolac Tromethamine] Other (See Comments)     \"makes my vagina burn\"       Vitals:    02/08/23 1319   Pulse: (!) 115   Resp: 16   SpO2: 97%   Weight: 212 lb (96.2 kg)   Height: 5' (1.524 m)         Review of Systems:   Review of Systems   Constitutional: Negative. HENT: Negative. Eyes: Negative. Respiratory: Negative. Cardiovascular: Negative. Gastrointestinal: Negative. Genitourinary: Negative. Musculoskeletal:  Positive for arthralgias. Skin: Negative. Neurological: Negative. Psychiatric/Behavioral: Negative. Physical Exam:   Gen/Psych:Examination reveals a pleasant individual in no acute distress. The patient is oriented to time, place and person. The patient's mood and affect are appropriate. Patient appears well nourished. Body habitus is overweight    HEENT: Head is atraumatic normocephalic, ears are symmetric, eyes show equal pupils bilaterally, extraocular muscles intact. Hearing is intact to normal voice at 5 feet. Nares are patent bilaterally, no epistaxis, no rhinorrhea. Lymph: No obvious lymphedema in bilateral upper extremities     Skin: Intact in bilateral upper extremities with no ulcerations, lesions, rash, erythema. Vascular: There are no varicosities in bilateral upper  extremities, sensation intact to light touch over bilateral upper extremities.     Musculoskeletal:  Right shoulder exam:  Skin:  Clear with no erythema, there is no significant joint effusion  Deformity: none  Atrophy:  none  Tenderness: Distal clavicle, acromion, AC joint  Active ROM:   FE:90    IR side: sacrum           ER side: 10   Ad/Abduction: 40    Passive range of motion of the right shoulder shows she has significant pain with forward elevation of the shoulder although she can get 150 degrees of forward elevation. Strength is 4/5    Cervical Spine tenderness: no    Imaging: X-rays reviewed which showed no obvious fractures or dislocations. There does appear to be a small ossific density just inferior to the acromion at the [facility] joint which is of undetermined significance. Assessment:     ICD-10-CM    1. Contusion of right shoulder, initial encounter  S40.011A MRI SHOULDER RIGHT WO CONTRAST                Plan:   Patient Instructions   Central Scheduling # [phone]  11 [facility] [phone]    MRI of Right Shoulder  Call office once MRI is completed or approved through [facility] as tolerated  Over-the-counter medication as needed for pain    We are committed to providing you the best care possible. If you receive a survey after visiting one of our offices, please take time to share your experience concerning your physician office visit. These surveys are confidential and no health information about you is shared. We are eager to improve for you and we are counting on your feedback to help make that happen. *Please note this report has been partially produced using speech recognition Dragon software and may contain errors related to that system including errors in grammar, punctuation, and spelling, as well as words and phrases that may be inappropriate.  If there are any questions or concerns please feel free to contact the dictating provider for clarification

## 2023-02-23 ENCOUNTER — HOSPITAL ENCOUNTER (OUTPATIENT)
Dept: MRI IMAGING | Age: 43
Discharge: HOME | End: 2023-02-23
Payer: COMMERCIAL

## 2023-02-23 DIAGNOSIS — S40.011A CONTUSION OF RIGHT SHOULDER, INITIAL ENCOUNTER: ICD-10-CM

## 2023-02-23 PROCEDURE — 73221 MRI JOINT UPR EXTREM W/O DYE: CPT

## 2023-03-06 ENCOUNTER — OFFICE VISIT (OUTPATIENT)
Dept: ORTHOPEDIC SURGERY | Age: 43
End: 2023-03-06

## 2023-03-06 ENCOUNTER — TELEPHONE (OUTPATIENT)
Dept: ORTHOPEDIC SURGERY | Age: 43
End: 2023-03-06

## 2023-03-06 VITALS
HEIGHT: 60 IN | RESPIRATION RATE: 16 BRPM | HEART RATE: 94 BPM | OXYGEN SATURATION: 98 % | WEIGHT: 214 LBS | BODY MASS INDEX: 42.01 KG/M2

## 2023-03-06 DIAGNOSIS — S43.431A SUPERIOR LABRUM ANTERIOR-TO-POSTERIOR (SLAP) TEAR OF RIGHT SHOULDER: Primary | ICD-10-CM

## 2023-03-06 DIAGNOSIS — S43.401A SPRAIN OF RIGHT SHOULDER, INITIAL ENCOUNTER: ICD-10-CM

## 2023-03-06 DIAGNOSIS — S43.431A SUPERIOR GLENOID LABRUM LESION OF RIGHT SHOULDER, INITIAL ENCOUNTER: ICD-10-CM

## 2023-03-06 DIAGNOSIS — S40.011A CONTUSION OF RIGHT SHOULDER, INITIAL ENCOUNTER: Primary | ICD-10-CM

## 2023-03-06 ASSESSMENT — ENCOUNTER SYMPTOMS
EYES NEGATIVE: 1
RESPIRATORY NEGATIVE: 1
GASTROINTESTINAL NEGATIVE: 1

## 2023-03-06 NOTE — LETTER
March 6, 2023       [place] [employer] YOB: 1980   [address] [facility] Date of Visit:  3/6/2023       To Whom It May Concern: It is my medical opinion that Michael Wilkins may return to work on 3/20/2023 only if she is able to maintain the following restriction: No right arm work duty. No lifting, pushing, pulling with the right arm. If restriction cannot be accommodated then the patient is to be off work until reevaluated in 6-8 weeks. If you have any questions or concerns, please don't hesitate to call.     Sincerely,        Jonatan Head PA-C

## 2023-03-06 NOTE — PATIENT INSTRUCTIONS
Physical Therapy and Shot approved through [facility]  Follow up in 6-8 weeks  No use of the right arm  No heavy lifting, pushing, or pulling until next follow up    We are committed to providing you the best care possible. If you receive a survey after visiting one of our offices, please take time to share your experience concerning your physician office visit. These surveys are confidential and no health information about you is shared. We are eager to improve for you and we are counting on your feedback to help make that happen.

## 2023-03-06 NOTE — PROGRESS NOTES
[facility]      HPI:  Benny Diaz is a 38 y.o. female that presents back to the office today to go over MRI results of the right shoulder. She states she continues to have 7/10 pain in the shoulder and sometimes the shoulder pain radiates up into her neck. Pain is still more superior with some radiation down into the biceps. There are days where she states the pain is not too bad and she can move the arm around with no significant difficulty but mom states she still has moderate pain in the arm. She has not done any physical therapy or had a steroid injection. Past Medical History:   Diagnosis Date    Anxiety     Headache(784.0)     Hypertension     Migraine     UTI (urinary tract infection)        Past Surgical History:   Procedure Laterality Date    BACK SURGERY      TUBAL LIGATION         No family history on file. Social History     Socioeconomic History    Marital status:      Spouse name: None    Number of children: None    Years of education: None    Highest education level: None   Tobacco Use    Smoking status: Never    Smokeless tobacco: Never   Substance and Sexual Activity    Alcohol use: Not Currently     Comment: occasionally     Drug use: No       Current Outpatient Medications   Medication Sig Dispense Refill    naproxen (NAPROSYN) 500 MG tablet Take 1 tablet by mouth 2 times daily 60 tablet 0    ibuprofen (ADVIL;MOTRIN) 600 MG tablet Take 1 tablet by mouth 3 times daily as needed for Pain 30 tablet 0    ondansetron (ZOFRAN) 4 MG tablet Take 1 tablet by mouth every 8 hours as needed for Nausea 10 tablet 0    Dextromethorphan-guaiFENesin 5-100 MG/5ML LIQD Take 5 mLs by mouth every 8 hours as needed (congestion, cough) 1 Bottle 0    methylPREDNISolone (MEDROL, HARI,) 4 MG tablet Take by mouth.  1 kit 0    dextran 70-hypromellose (ARTIFICIAL TEARS) 0.1-0.3 % SOLN opthalmic solution Place 1 drop into both eyes every 4 hours as needed (eye irritation) 1 Bottle 2    metoprolol succinate (TOPROL XL) 25 MG extended release tablet Take 1 tablet by mouth daily 30 tablet 0     No current facility-administered medications for this visit. Allergies   Allergen Reactions    Phenergan [Promethazine Hcl] Other (See Comments)     \"everything happens, I get anxious\"    Toradol [Ketorolac Tromethamine] Other (See Comments)     \"makes my vagina burn\"       Vitals:    03/06/23 1003   Pulse: 94   Resp: 16   SpO2: 98%   Weight: 214 lb (97.1 kg)   Height: 5' (1.524 m)         Review of Systems:   Review of Systems   Constitutional: Negative. HENT: Negative. Eyes: Negative. Respiratory: Negative. Cardiovascular: Negative. Gastrointestinal: Negative. Genitourinary: Negative. Musculoskeletal:  Positive for arthralgias. Skin: Negative. Neurological: Negative. Psychiatric/Behavioral: Negative. Physical Exam:   Gen/Psych:Examination reveals a pleasant individual in no acute distress. The patient is oriented to time, place and person. The patient's mood and affect are appropriate. Patient appears well nourished. Body habitus is overweight    HEENT: Head is atraumatic normocephalic, ears are symmetric, eyes show equal pupils bilaterally, extraocular muscles intact. Hearing is intact to normal voice at 5 feet. Nares are patent bilaterally, no epistaxis, no rhinorrhea. Lymph: No obvious lymphedema in bilateral upper extremities     Skin: Intact in bilateral upper extremities with no ulcerations, lesions, rash, erythema. Vascular: There are no varicosities in bilateral upper  extremities, sensation intact to light touch over bilateral upper extremities. Musculoskeletal:  Right shoulder exam:  Skin:  Clear with no erythema, there is no significant joint effusion  Deformity:  none  Atrophy:  none  Tenderness: AC joint, biceps, anterior and posterior glenoid.   Active ROM:   FE:130 degrees    IR side: sacrum           ER side: 10   Ad/Abduction: 40     Cervical Spine tenderness: no    Imaging:   mpression   Mild to moderate supraspinatus tendinosis.  No rotator cuff tear is   identified.       SLAP tear.  This extends to the mid posterior labrum.       Mild AC joint osteoarthrosis.       No acute bony abnormality is seen.         Assessment:     ICD-10-CM    1. Contusion of right shoulder, initial encounter  S40.011A       2. Sprain of right shoulder, initial encounter  S43.401A       3. Superior glenoid labrum lesion of right shoulder, initial encounter  S43.431A                 Plan:     Patient Instructions   Physical Therapy and Shot approved through [facility]  Follow up in 6-8 weeks  No use of the right arm  No heavy lifting, pushing, or pulling until next follow up    We are committed to providing you the best care possible.     If you receive a survey after visiting one of our offices, please take time to share your experience concerning your physician office visit.  These surveys are confidential and no health information about you is shared.     We are eager to improve for you and we are counting on your feedback to help make that happen.          *Please note this report has been partially produced using speech recognition Dragon software and may contain errors related to that system including errors in grammar, punctuation, and spelling, as well as words and phrases that may be inappropriate. If there are any questions or concerns please feel free to contact the dictating provider for clarification

## 2023-03-24 ENCOUNTER — TELEPHONE (OUTPATIENT)
Dept: ORTHOPEDIC SURGERY | Age: 43
End: 2023-03-24

## 2023-03-24 ENCOUNTER — OFFICE VISIT (OUTPATIENT)
Dept: ORTHOPEDIC SURGERY | Age: 43
End: 2023-03-24

## 2023-03-24 VITALS — SYSTOLIC BLOOD PRESSURE: 136 MMHG | OXYGEN SATURATION: 98 % | HEART RATE: 88 BPM | DIASTOLIC BLOOD PRESSURE: 96 MMHG

## 2023-03-24 DIAGNOSIS — S43.401A SPRAIN OF RIGHT SHOULDER, INITIAL ENCOUNTER: ICD-10-CM

## 2023-03-24 DIAGNOSIS — S43.431A SUPERIOR LABRUM ANTERIOR-TO-POSTERIOR (SLAP) TEAR OF RIGHT SHOULDER: Primary | ICD-10-CM

## 2023-03-24 NOTE — PROGRESS NOTES
[place]  and Sports Medicine      HPI:  Sabine Walker is a 38 y.o. female that returns to the office today for steroid injection in the right shoulder. She continues to have the same pain and sometimes has pain when she does simple things such as burping her grandchild. Pain radiates down the biceps. Past Medical History:   Diagnosis Date    Anxiety     Headache(784.0)     Hypertension     Migraine     UTI (urinary tract infection)        Past Surgical History:   Procedure Laterality Date    BACK SURGERY      TUBAL LIGATION         No family history on file. Social History     Socioeconomic History    Marital status:    Tobacco Use    Smoking status: Never    Smokeless tobacco: Never   Substance and Sexual Activity    Alcohol use: Not Currently     Comment: occasionally     Drug use: No       Current Outpatient Medications   Medication Sig Dispense Refill    naproxen (NAPROSYN) 500 MG tablet Take 1 tablet by mouth 2 times daily 60 tablet 0    ibuprofen (ADVIL;MOTRIN) 600 MG tablet Take 1 tablet by mouth 3 times daily as needed for Pain 30 tablet 0    ondansetron (ZOFRAN) 4 MG tablet Take 1 tablet by mouth every 8 hours as needed for Nausea 10 tablet 0    Dextromethorphan-guaiFENesin 5-100 MG/5ML LIQD Take 5 mLs by mouth every 8 hours as needed (congestion, cough) 1 Bottle 0    methylPREDNISolone (MEDROL, HARI,) 4 MG tablet Take by mouth. 1 kit 0    dextran 70-hypromellose (ARTIFICIAL TEARS) 0.1-0.3 % SOLN opthalmic solution Place 1 drop into both eyes every 4 hours as needed (eye irritation) 1 Bottle 2    metoprolol succinate (TOPROL XL) 25 MG extended release tablet Take 1 tablet by mouth daily 30 tablet 0     No current facility-administered medications for this visit.        Allergies   Allergen Reactions    Phenergan [Promethazine Hcl] Other (See Comments)     \"everything happens, I get anxious\"    Toradol [Ketorolac Tromethamine] Other (See Comments)     \"makes my vagina burn\"

## 2023-03-24 NOTE — PATIENT INSTRUCTIONS
Would like patient to start physical therapy and then see her back after about 4 weeks and see how she is doing. No right upper extremity work. May do seated or sedentary work duty only if available. Injection given in right shoulder today. May work on lower body exercises at the gym if you would like in order to promote weight loss but do not do any upper body work. May do things such as elliptical with no using the arms or treadmill walking or also recumbent bike. If you are going to do any weight lifting make sure it is lower body only.

## 2023-03-27 ASSESSMENT — ENCOUNTER SYMPTOMS
RESPIRATORY NEGATIVE: 1
GASTROINTESTINAL NEGATIVE: 1
EYES NEGATIVE: 1

## 2023-04-02 ENCOUNTER — APPOINTMENT (OUTPATIENT)
Dept: GENERAL RADIOLOGY | Age: 43
End: 2023-04-02
Payer: COMMERCIAL

## 2023-04-02 ENCOUNTER — HOSPITAL ENCOUNTER (EMERGENCY)
Age: 43
Discharge: HOME | End: 2023-04-02
Attending: EMERGENCY MEDICINE
Payer: COMMERCIAL

## 2023-04-02 ENCOUNTER — APPOINTMENT (OUTPATIENT)
Dept: CT IMAGING | Age: 43
End: 2023-04-02
Payer: COMMERCIAL

## 2023-04-02 VITALS
OXYGEN SATURATION: 100 % | TEMPERATURE: 98.6 F | HEART RATE: 97 BPM | DIASTOLIC BLOOD PRESSURE: 90 MMHG | WEIGHT: 190 LBS | RESPIRATION RATE: 14 BRPM | BODY MASS INDEX: 30.53 KG/M2 | HEIGHT: 66 IN | SYSTOLIC BLOOD PRESSURE: 131 MMHG

## 2023-04-02 DIAGNOSIS — U07.1 COVID-19: Primary | ICD-10-CM

## 2023-04-02 LAB
ALBUMIN SERPL-MCNC: 3.9 GM/DL (ref 3.4–5)
ALP BLD-CCNC: 144 IU/L (ref 40–129)
ALT SERPL-CCNC: 26 U/L (ref 10–40)
ANION GAP SERPL CALCULATED.3IONS-SCNC: 10 MMOL/L (ref 4–16)
AST SERPL-CCNC: 25 IU/L (ref 15–37)
BASOPHILS ABSOLUTE: 0.1 K/CU MM
BASOPHILS ABSOLUTE: NORMAL K/CU MM
BASOPHILS RELATIVE PERCENT: 0.5 % (ref 0–1)
BASOPHILS RELATIVE PERCENT: NORMAL % (ref 0–1)
BILIRUB SERPL-MCNC: 0.4 MG/DL (ref 0–1)
BUN SERPL-MCNC: 14 MG/DL (ref 6–23)
CALCIUM SERPL-MCNC: 8.8 MG/DL (ref 8.3–10.6)
CHLORIDE BLD-SCNC: 100 MMOL/L (ref 99–110)
CO2: 22 MMOL/L (ref 21–32)
CREAT SERPL-MCNC: 0.8 MG/DL (ref 0.6–1.1)
D DIMER: NORMAL NG/ML(DDU)
DIFFERENTIAL TYPE: ABNORMAL
DIFFERENTIAL TYPE: NORMAL
EKG ATRIAL RATE: 106 BPM
EKG DIAGNOSIS: NORMAL
EKG P AXIS: 50 DEGREES
EKG P-R INTERVAL: 136 MS
EKG Q-T INTERVAL: 326 MS
EKG QRS DURATION: 82 MS
EKG QTC CALCULATION (BAZETT): 433 MS
EKG R AXIS: 5 DEGREES
EKG T AXIS: 13 DEGREES
EKG VENTRICULAR RATE: 106 BPM
EOSINOPHILS ABSOLUTE: 0.1 K/CU MM
EOSINOPHILS ABSOLUTE: NORMAL K/CU MM
EOSINOPHILS RELATIVE PERCENT: 1.1 % (ref 0–3)
EOSINOPHILS RELATIVE PERCENT: NORMAL % (ref 0–3)
GFR SERPL CREATININE-BSD FRML MDRD: >60 ML/MIN/1.73M2
GLUCOSE SERPL-MCNC: 92 MG/DL (ref 70–99)
HCT VFR BLD CALC: 39.7 % (ref 37–47)
HCT VFR BLD CALC: NORMAL % (ref 37–47)
HEMOGLOBIN: 12.9 GM/DL (ref 12.5–16)
HEMOGLOBIN: NORMAL GM/DL (ref 12.5–16)
IMMATURE NEUTROPHIL %: 0.4 % (ref 0–0.43)
IMMATURE NEUTROPHIL %: NORMAL % (ref 0–0.43)
LYMPHOCYTES ABSOLUTE: 1.1 K/CU MM
LYMPHOCYTES ABSOLUTE: NORMAL K/CU MM
LYMPHOCYTES RELATIVE PERCENT: 10.3 % (ref 24–44)
LYMPHOCYTES RELATIVE PERCENT: NORMAL % (ref 24–44)
MCH RBC QN AUTO: 28.8 PG (ref 27–31)
MCH RBC QN AUTO: NORMAL PG (ref 27–31)
MCHC RBC AUTO-ENTMCNC: 32.5 % (ref 32–36)
MCHC RBC AUTO-ENTMCNC: NORMAL % (ref 32–36)
MCV RBC AUTO: 88.6 FL (ref 78–100)
MCV RBC AUTO: NORMAL FL (ref 78–100)
MONOCYTES ABSOLUTE: 0.9 K/CU MM
MONOCYTES ABSOLUTE: NORMAL K/CU MM
MONOCYTES RELATIVE PERCENT: 7.7 % (ref 0–4)
MONOCYTES RELATIVE PERCENT: NORMAL % (ref 0–4)
NUCLEATED RBC %: 0 %
NUCLEATED RBC %: NORMAL %
PDW BLD-RTO: 13.5 % (ref 11.7–14.9)
PDW BLD-RTO: NORMAL % (ref 11.7–14.9)
PLATELET # BLD: 279 K/CU MM (ref 140–440)
PLATELET # BLD: NORMAL K/CU MM (ref 140–440)
PMV BLD AUTO: 9.2 FL (ref 7.5–11.1)
PMV BLD AUTO: NORMAL FL (ref 7.5–11.1)
POTASSIUM SERPL-SCNC: 4.5 MMOL/L (ref 3.5–5.1)
RAPID INFLUENZA  B AGN: NEGATIVE
RAPID INFLUENZA A AGN: NEGATIVE
RBC # BLD: 4.48 M/CU MM (ref 4.2–5.4)
RBC # BLD: NORMAL M/CU MM (ref 4.2–5.4)
REASON FOR REJECTION: NORMAL
REASON FOR REJECTION: NORMAL
REJECTED TEST: NORMAL
REJECTED TEST: NORMAL
SARS-COV-2 RDRP RESP QL NAA+PROBE: DETECTED
SEGMENTED NEUTROPHILS ABSOLUTE COUNT: 8.9 K/CU MM
SEGMENTED NEUTROPHILS ABSOLUTE COUNT: NORMAL K/CU MM
SEGMENTED NEUTROPHILS RELATIVE PERCENT: 80 % (ref 36–66)
SEGMENTED NEUTROPHILS RELATIVE PERCENT: NORMAL % (ref 36–66)
SODIUM BLD-SCNC: 132 MMOL/L (ref 135–145)
SOURCE: ABNORMAL
TOTAL IMMATURE NEUTOROPHIL: 0.04 K/CU MM
TOTAL IMMATURE NEUTOROPHIL: NORMAL K/CU MM
TOTAL NUCLEATED RBC: 0 K/CU MM
TOTAL NUCLEATED RBC: NORMAL K/CU MM
TOTAL PROTEIN: 7.8 GM/DL (ref 6.4–8.2)
TROPONIN T: <0.01 NG/ML
WBC # BLD: 11.1 K/CU MM (ref 4–10.5)
WBC # BLD: NORMAL K/CU MM (ref 4–10.5)

## 2023-04-02 PROCEDURE — 6360000004 HC RX CONTRAST MEDICATION: Performed by: EMERGENCY MEDICINE

## 2023-04-02 PROCEDURE — 93010 ELECTROCARDIOGRAM REPORT: CPT | Performed by: INTERNAL MEDICINE

## 2023-04-02 PROCEDURE — 84484 ASSAY OF TROPONIN QUANT: CPT

## 2023-04-02 PROCEDURE — 80053 COMPREHEN METABOLIC PANEL: CPT

## 2023-04-02 PROCEDURE — 87804 INFLUENZA ASSAY W/OPTIC: CPT

## 2023-04-02 PROCEDURE — 6370000000 HC RX 637 (ALT 250 FOR IP): Performed by: EMERGENCY MEDICINE

## 2023-04-02 PROCEDURE — 96360 HYDRATION IV INFUSION INIT: CPT

## 2023-04-02 PROCEDURE — 85379 FIBRIN DEGRADATION QUANT: CPT

## 2023-04-02 PROCEDURE — 96361 HYDRATE IV INFUSION ADD-ON: CPT

## 2023-04-02 PROCEDURE — 93005 ELECTROCARDIOGRAM TRACING: CPT | Performed by: EMERGENCY MEDICINE

## 2023-04-02 PROCEDURE — 87635 SARS-COV-2 COVID-19 AMP PRB: CPT

## 2023-04-02 PROCEDURE — 85025 COMPLETE CBC W/AUTO DIFF WBC: CPT

## 2023-04-02 PROCEDURE — 36415 COLL VENOUS BLD VENIPUNCTURE: CPT

## 2023-04-02 PROCEDURE — 71275 CT ANGIOGRAPHY CHEST: CPT

## 2023-04-02 PROCEDURE — 71046 X-RAY EXAM CHEST 2 VIEWS: CPT

## 2023-04-02 PROCEDURE — 99285 EMERGENCY DEPT VISIT HI MDM: CPT

## 2023-04-02 PROCEDURE — 2580000003 HC RX 258: Performed by: EMERGENCY MEDICINE

## 2023-04-02 RX ORDER — BENZONATATE 100 MG/1
100 CAPSULE ORAL 3 TIMES DAILY PRN
Qty: 20 CAPSULE | Refills: 0 | Status: SHIPPED | OUTPATIENT
Start: 2023-04-02 | End: 2023-04-09

## 2023-04-02 RX ORDER — 0.9 % SODIUM CHLORIDE 0.9 %
500 INTRAVENOUS SOLUTION INTRAVENOUS ONCE
Status: COMPLETED | OUTPATIENT
Start: 2023-04-02 | End: 2023-04-02

## 2023-04-02 RX ORDER — GUAIFENESIN 600 MG/1
600 TABLET, EXTENDED RELEASE ORAL 2 TIMES DAILY
Qty: 20 TABLET | Refills: 0 | Status: SHIPPED | OUTPATIENT
Start: 2023-04-02 | End: 2023-04-17

## 2023-04-02 RX ORDER — ONDANSETRON 4 MG/1
4 TABLET, FILM COATED ORAL EVERY 8 HOURS PRN
Qty: 10 TABLET | Refills: 0 | Status: SHIPPED | OUTPATIENT
Start: 2023-04-02

## 2023-04-02 RX ORDER — HYDROCODONE BITARTRATE AND ACETAMINOPHEN 5; 325 MG/1; MG/1
1 TABLET ORAL ONCE
Status: COMPLETED | OUTPATIENT
Start: 2023-04-02 | End: 2023-04-02

## 2023-04-02 RX ADMIN — IOPAMIDOL 80 ML: 755 INJECTION, SOLUTION INTRAVENOUS at 10:49

## 2023-04-02 RX ADMIN — HYDROCODONE BITARTRATE AND ACETAMINOPHEN 1 TABLET: 5; 325 TABLET ORAL at 11:32

## 2023-04-02 RX ADMIN — SODIUM CHLORIDE 500 ML: 9 INJECTION, SOLUTION INTRAVENOUS at 09:19

## 2023-04-02 ASSESSMENT — PAIN DESCRIPTION - DESCRIPTORS: DESCRIPTORS: ACHING

## 2023-04-02 ASSESSMENT — LIFESTYLE VARIABLES: HOW OFTEN DO YOU HAVE A DRINK CONTAINING ALCOHOL: NEVER

## 2023-04-02 ASSESSMENT — PAIN SCALES - GENERAL: PAINLEVEL_OUTOF10: 10

## 2023-04-02 NOTE — ED NOTES
Bedside report received from [facility] 72.; care assumed at this time.       Daly Zaman RN  04/02/23 7271

## 2023-04-02 NOTE — ED PROVIDER NOTES
Lungs/pleura: The lungs are without acute process. No focal consolidation or pulmonary edema. No evidence of pleural effusion or pneumothorax. There are scattered areas of atelectasis. Upper Abdomen: Limited images of the upper abdomen are unremarkable. Note is made of a small hiatal hernia. Soft Tissues/Bones: No acute bone or soft tissue abnormality. 1.No evidence of pulmonary embolism or acute pulmonary abnormality. CC/HPI Summary, DDx, ED Course, and Reassessment:   Patient has been given IV fluids as well as pain medication. She appears to a little more comfortable on exam but continues to have an intermittent cough. Sats now appropriate on room air. She is able to ambulate without significant desaturation below 94% for increased work of breathing. Labs reviewed. Patient is found to be COVID-positive. Mild leukocytosis without lactic acid elevation. Low suspicion for superimposed bacterial infection. Chest x-ray is clear. Chemistries reassuring without significant electrolyte derangement or renal dysfunction. EKG and troponin are nonacute. As the patient's D-dimer is elevated, patient is sent for CTA chest.  This is reassuring with no infiltrate, PE or other acute process per radiology. Patient was given the following medications:  Medications   0.9 % sodium chloride bolus (0 mLs IntraVENous Stopped 4/2/23 1120)   iopamidol (ISOVUE-370) 76 % injection 80 mL (80 mLs IntraVENous Given 4/2/23 1049)   HYDROcodone-acetaminophen (NORCO) 5-325 MG per tablet 1 tablet (1 tablet Oral Given 4/2/23 1132)       EKG (if obtained): (All EKG's are interpreted by myself in the absence of a cardiologist) sinus tachycardia 106. Normal axis with good R wave progression. No ST elevation or depression. No acute change from prior tracing.     Disposition Considerations (tests considered but not done, Shared Decision Making, Pt Expectation of Test or Tx.):    Given patient's reassuring work-up, I

## 2023-04-02 NOTE — ED NOTES
Gave bedside hand off report to [facility].       Chula Lorenzo RN  04/02/23 0380

## 2023-04-02 NOTE — ED NOTES
Discharge instructions reviewed with pt. All questions answered at this time. Pt verbalized understanding.        Shellie Ramos RN  04/02/23 7651

## 2023-04-02 NOTE — ED NOTES
Ambulated patient. Patient's oxygen dropped to 94%.       [facility]  23 1120

## 2023-04-02 NOTE — ED TRIAGE NOTES
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

## 2023-04-18 ENCOUNTER — HOSPITAL ENCOUNTER (OUTPATIENT)
Dept: PHYSICAL THERAPY | Age: 43
Setting detail: THERAPIES SERIES
Discharge: HOME | End: 2023-04-18
Payer: COMMERCIAL

## 2023-04-18 PROCEDURE — 97110 THERAPEUTIC EXERCISES: CPT

## 2023-04-18 NOTE — FLOWSHEET NOTE
Outpatient Physical Therapy  [place]           [x] Phone: [phone]   Fax: [phone]  Jas[facility]           [] Phone: [phone]   Fax: [phone]        Physical Therapy Daily Treatment Note  Date:  2023    Patient Name:  Mark Nix \"Neal\"  :  1980  MRN: [id number]  Restrictions/Precautions: Restrictions/Precautions: None; limit coupled motion into ext/abd/ER   Diagnosis:    Diagnosis: SLAP tear of R shoulder  Date of Injury/Surgery:   Treatment Diagnosis:  R shoulder pain, decreased ROM and strength in R shoulder  Insurance/Certification information: , 12 visits by 23  Referring Physician:   Gordo Del Rosario PA-C   Next Doctor Visit:    Plan of care signed (Y/N): Y  Outcome Measure: QuickDASH: 56.8%  Visit# / total visits:   Pain level: 7/10            Goals:     Patient goals: decrease pain  Short term goals  Time Frame for Short term goals:    See LTG  Long Term Goals  Time Frame for Long Term Goals: 12 visits  Pt will decrease Quick DASH score to 46% or lower to indicate subjective improvement. Pt able to perform Marysol's test for 30 seconds with no increase in symptoms to show improvement in neural symptoms. Pt will be able to lift 25# from waist to chest during a stand pivot transfer to indicate progress towards safe return to work. Pt able to improve shoulder flexion to 160 degs to aid in [place] lifting for work. Pt will improve R shoulder strength to 4/5 to complete work related duties without difficulty. Summary of Evaluation:  Assessment: Pt is a 82-year-old female who presents to PT with R shoulder pain secondary to SLAP tear from an accident at work. Upon assessment, pt has decreased AROM and strength in R shoulder and increased pain in R UE which is limiting pts ability to complete ADLs indep. Pt is currently unable to reach [place], lift using R UE, and has pain with all movements which is not allowing pt to be able to work.  Objectively, pt also presents

## 2023-04-20 ENCOUNTER — HOSPITAL ENCOUNTER (OUTPATIENT)
Dept: PHYSICAL THERAPY | Age: 43
Discharge: HOME | End: 2023-04-20

## 2023-04-20 NOTE — FLOWSHEET NOTE
Patient went to [facility]) [facility] and was not able to come in time for this appt. She rescheduled for tomorrow.

## 2023-04-20 NOTE — FLOWSHEET NOTE
Physical Therapy  Cancellation/No-show Note  Patient Name:  Leonid Figueroa  :  1980   Date:  2023  Cancelled visits to date: 1  No-shows to date: 0    For today's appointment patient:  [x]  Cancelled  []  Rescheduled appointment  []  No-show     Reason given by patient:  []  Patient ill  []  Conflicting appointment  []  No transportation    []  Conflict with work  []  No reason given  []  Other:     Comments:  Patient went to [facility] and was not able to come in time for this appt.  She rescheduled for tomorrow    Electronically signed by:  Josef Goldman, RYAN      2023,2:51 PM

## 2023-04-21 ENCOUNTER — HOSPITAL ENCOUNTER (OUTPATIENT)
Dept: PHYSICAL THERAPY | Age: 43
Setting detail: THERAPIES SERIES
Discharge: HOME | End: 2023-04-21
Payer: COMMERCIAL

## 2023-04-21 PROCEDURE — 97110 THERAPEUTIC EXERCISES: CPT

## 2023-04-21 NOTE — FLOWSHEET NOTE
with positive Marysols and [place]s test on R. Based off these tests, positive median nerve tension test, and pain presentation PT suspects possible brachial plexus injury specifically into the median distribution. If pt does not improve with therapy, EMG might be warranted to further investigate symptoms. Pt would benefit from skilled PT to address current impairments, work towards meeting goals to return pt to work, while preventing future injury or further decline. Subjective:  Pt arrives to tx session reporting 6/10 pain in R shoulder. Any changes in Ambulatory Summary Sheet? None    Objective:   COVID screening questions were asked and patient attested that there had been no contact or symptoms     Pt required VC's for avoiding UT guarding during exercise. VC's for correcting posture as well. Exercises: (No more than 4 columns)   Exercise/Equipment 4/13/23 #1 4/18/23 #2 Date           WARM UP      UBE  1' - too painful           TABLE      Table slides  Flex x10, abd x10 Flex 2x10, scap 2x10, ER x10 Flex 2x10, scap 2x10, ER x10   Scap retraction 1x10 2x10 2x10   Rows       Bicep curl   1# x17 with rest breaks as needed  1# x20 with rest breaks as needed             STANDING                                                     PROPRIOCEPTION                                    MODALITIES      Hot pack  10' R shoulder in supine  10' R shoulder in supine              Other Therapeutic Activities/Education: , nerve pain vs muscle pain    Home Exercise Program:  table slides flex/abd, scapular retraction    Manual Treatments: none     Modalities: hot pack to R shoulder in supine x10'. Skin intact before and after modality. Communication with other providers:  POC sent    Assessment:  Pt tolerated today's session fair secondary to nerve pain. Required frequent rest breaks due to increased pain and N/T sensations when performing exercises.  Pt would benefit from continued skilled PT to address current

## 2023-04-24 ENCOUNTER — HOSPITAL ENCOUNTER (OUTPATIENT)
Dept: PHYSICAL THERAPY | Age: 43
Setting detail: THERAPIES SERIES
Discharge: HOME | End: 2023-04-24
Payer: COMMERCIAL

## 2023-04-24 PROCEDURE — 97110 THERAPEUTIC EXERCISES: CPT

## 2023-04-24 NOTE — FLOWSHEET NOTE
with positive Marysols and [place]s test on R. Based off these tests, positive median nerve tension test, and pain presentation PT suspects possible brachial plexus injury specifically into the median distribution. If pt does not improve with therapy, EMG might be warranted to further investigate symptoms. Pt would benefit from skilled PT to address current impairments, work towards meeting goals to return pt to work, while preventing future injury or further decline. Subjective:  Pt states she is feeling about the same today, rating it a 7/10. Does not feel like nerve pain is getting any better. Pt states she has been using heat on her R shoulder at home and it is helping reduce muscle tension significantly. Any changes in Ambulatory Summary Sheet? None    Objective:   COVID screening questions were asked and patient attested that there had been no contact or symptoms     Increase N/T to digits 2-4 with cane ER, flexion    Exercises: (No more than 4 columns)   Exercise/Equipment 4/18/23 #2 4/21/23 #3 4/24/23 #4           WARM UP      UBE - too painful at this time 1' - too painful            TABLE      Table slides  Flex 2x10, scap 2x10, ER x10 Flex 2x10, scap 2x10, ER x10 scap 2x10   Scap retraction 2x10 2x10 2x10   Cane AAROM   Flexion 2x10, ER 1x10   Bicep curl  1# x17 with rest breaks as needed  1# x20 with rest breaks as needed  1# x20 with rest breaks as needed     AAROM with ball   Standing with RSB; flexion x20; scaption x20         STANDING                                                     PROPRIOCEPTION                                    MODALITIES      Hot pack 10' R shoulder in supine  10' R shoulder in supine  10' R shoulder in supine              Other Therapeutic Activities/Education:  HEP,   Home Exercise Program:  table slides flex/abd, scapular retraction    Manual Treatments: none     Modalities: hot pack to R shoulder in supine x10'. Skin intact before and after modality.

## 2023-04-27 ENCOUNTER — HOSPITAL ENCOUNTER (OUTPATIENT)
Dept: PHYSICAL THERAPY | Age: 43
Discharge: HOME | End: 2023-04-27

## 2023-04-27 NOTE — FLOWSHEET NOTE
Physical Therapy  Cancellation/No-show Note  Patient Name:  Brenda Arauz  :  1980   Date:  2023  Cancelled visits to date: 1  No-shows to date: 1    For today's appointment patient:  []  Cancelled  []  Rescheduled appointment  [x]  No-show     Reason given by patient:  []  Patient ill  []  Conflicting appointment  []  No transportation    []  Conflict with work  []  No reason given  [x]  Other:     Comments: thought her appt was tomorrow.  Rescheduled appt     Electronically signed by:  LINDA Hopper 2023,3:04 PM

## 2023-04-28 ENCOUNTER — HOSPITAL ENCOUNTER (OUTPATIENT)
Dept: PHYSICAL THERAPY | Age: 43
Discharge: HOME | End: 2023-04-28

## 2023-05-01 ENCOUNTER — TELEPHONE (OUTPATIENT)
Dept: ORTHOPEDIC SURGERY | Age: 43
End: 2023-05-01

## 2023-05-01 ENCOUNTER — OFFICE VISIT (OUTPATIENT)
Dept: ORTHOPEDIC SURGERY | Age: 43
End: 2023-05-01
Payer: COMMERCIAL

## 2023-05-01 VITALS
RESPIRATION RATE: 16 BRPM | BODY MASS INDEX: 35 KG/M2 | WEIGHT: 217.8 LBS | HEIGHT: 66 IN | SYSTOLIC BLOOD PRESSURE: 130 MMHG | DIASTOLIC BLOOD PRESSURE: 80 MMHG | TEMPERATURE: 97 F

## 2023-05-01 DIAGNOSIS — S43.431A SUPERIOR LABRUM ANTERIOR-TO-POSTERIOR (SLAP) TEAR OF RIGHT SHOULDER: ICD-10-CM

## 2023-05-01 DIAGNOSIS — S43.401A SPRAIN OF RIGHT SHOULDER, INITIAL ENCOUNTER: Primary | ICD-10-CM

## 2023-05-01 PROCEDURE — G8427 DOCREV CUR MEDS BY ELIG CLIN: HCPCS | Performed by: PHYSICIAN ASSISTANT

## 2023-05-01 PROCEDURE — 99212 OFFICE O/P EST SF 10 MIN: CPT | Performed by: PHYSICIAN ASSISTANT

## 2023-05-01 PROCEDURE — 1036F TOBACCO NON-USER: CPT | Performed by: PHYSICIAN ASSISTANT

## 2023-05-01 PROCEDURE — G8417 CALC BMI ABV UP PARAM F/U: HCPCS | Performed by: PHYSICIAN ASSISTANT

## 2023-05-01 RX ORDER — HYDROCHLOROTHIAZIDE 25 MG/1
TABLET ORAL
COMMUNITY
Start: 2023-03-09

## 2023-05-01 RX ORDER — CYCLOBENZAPRINE HCL 10 MG
10 TABLET ORAL NIGHTLY
COMMUNITY
Start: 2023-03-09

## 2023-05-01 RX ORDER — HYDROXYZINE PAMOATE 25 MG/1
25 CAPSULE ORAL 3 TIMES DAILY PRN
COMMUNITY

## 2023-05-01 ASSESSMENT — ENCOUNTER SYMPTOMS
RESPIRATORY NEGATIVE: 1
EYES NEGATIVE: 1
GASTROINTESTINAL NEGATIVE: 1

## 2023-05-01 NOTE — PROGRESS NOTES
[facility]      HPI:  Jesus Gill is a 38 y.o. female returning back to the office after physical therapy and injection of the shoulder. She states that the injection only helped for maybe a couple days and therapy has offered minimal relief. She continues to have pain within the anterior and posterior shoulder into the proximal biceps. She states that at this point she just wants to get it fixed if possible because she has already rested it, done the injection and done physical therapy and reports very little improvement. MRI of the shoulder appear to show a labral tear. Past Medical History:   Diagnosis Date    Anxiety     Headache(784.0)     Hypertension     Migraine     UTI (urinary tract infection)        Past Surgical History:   Procedure Laterality Date    BACK SURGERY      TUBAL LIGATION         No family history on file.     Social History     Socioeconomic History    Marital status:      Spouse name: None    Number of children: None    Years of education: None    Highest education level: None   Tobacco Use    Smoking status: Never    Smokeless tobacco: Never   Substance and Sexual Activity    Alcohol use: Not Currently     Comment: occasionally     Drug use: No       Current Outpatient Medications   Medication Sig Dispense Refill    cyclobenzaprine (FLEXERIL) 10 MG tablet Take 1 tablet by mouth nightly      hydroCHLOROthiazide (HYDRODIURIL) 25 MG tablet take 1 tablet by oral route every day      hydrOXYzine pamoate (VISTARIL) 25 MG capsule Take 1 capsule by mouth 3 times daily as needed for Itching      ibuprofen (ADVIL;MOTRIN) 600 MG tablet Take 1 tablet by mouth 3 times daily as needed for Pain 30 tablet 0    metoprolol succinate (TOPROL XL) 25 MG extended release tablet Take 1 tablet by mouth daily 30 tablet 0    ondansetron (ZOFRAN) 4 MG tablet Take 1 tablet by mouth every 8 hours as needed for Nausea (Patient not taking: Reported on 5/1/2023) 10 tablet

## 2023-05-01 NOTE — PATIENT INSTRUCTIONS
Will submit claim to [facility] for surgery  Office will contact you once approval is received  Follow up with Dr. Reid Timmons once [facility] approval is obtained

## 2023-05-01 NOTE — PROGRESS NOTES
Patient presents to the office for [facility] follow up on her right shoulder with c/o of continued right shoulder and neck pain. Pain is rated at a 8/10 with injection only providing relief for 2 days and therapy providing mild relief. Patient wishes to discuss surgery. Denies new injury.

## 2023-05-05 ENCOUNTER — HOSPITAL ENCOUNTER (OUTPATIENT)
Dept: PHYSICAL THERAPY | Age: 43
Setting detail: THERAPIES SERIES
Discharge: HOME | End: 2023-05-05
Payer: COMMERCIAL

## 2023-05-05 PROCEDURE — 97110 THERAPEUTIC EXERCISES: CPT

## 2023-05-05 PROCEDURE — 97140 MANUAL THERAPY 1/> REGIONS: CPT

## 2023-05-05 NOTE — FLOWSHEET NOTE
Outpatient Physical Therapy  [place]           [x] Phone: [phone]   Fax: [phone]  [facility]           [] Phone: [phone]   Fax: [phone]        Physical Therapy Daily Treatment Note  Date:  2023    Patient Name:  Francisco Hall, \"Neal\"  :  1980  MRN: [id number]  Restrictions/Precautions: Restrictions/Precautions: None; limit coupled motion into ext/abd/ER   Diagnosis:    Diagnosis: SLAP tear of R shoulder  Date of Injury/Surgery:   Treatment Diagnosis:  R shoulder pain, decreased ROM and strength in R shoulder  Insurance/Certification information: , 12 visits by  23  Referring Physician:   Regina Cardoza PA-C   Next Doctor Visit:    Plan of care signed (Y/N): Y  Outcome Measure: QuickDASH: 56.8%  Visit# / total visits:   Pain level: 7/10            Goals:     Patient goals: decrease pain  Short term goals  Time Frame for Short term goals:    See LTG  Long Term Goals  Time Frame for Long Term Goals: 12 visits  Pt will decrease Quick DASH score to 46% or lower to indicate subjective improvement. Pt able to perform Marysol's test for 30 seconds with no increase in symptoms to show improvement in neural symptoms. Pt will be able to lift 25# from waist to chest during a stand pivot transfer to indicate progress towards safe return to work. Pt able to improve shoulder flexion to 160 degs to aid in [facility] lifting for work. Pt will improve R shoulder strength to 4/5 to complete work related duties without difficulty. Summary of Evaluation:  Assessment: Pt is a 46-year-old female who presents to PT with R shoulder pain secondary to SLAP tear from an accident at work. Upon assessment, pt has decreased AROM and strength in R shoulder and increased pain in R UE which is limiting pts ability to complete ADLs indep. Pt is currently unable to reach [facility], lift using R UE, and has pain with all movements which is not allowing pt to be able to work.  Objectively, pt also presents with

## 2023-05-09 ENCOUNTER — HOSPITAL ENCOUNTER (OUTPATIENT)
Dept: PHYSICAL THERAPY | Age: 43
Setting detail: THERAPIES SERIES
Discharge: HOME | End: 2023-05-09
Payer: COMMERCIAL

## 2023-05-09 PROCEDURE — 97110 THERAPEUTIC EXERCISES: CPT

## 2023-05-09 PROCEDURE — 97140 MANUAL THERAPY 1/> REGIONS: CPT

## 2023-05-09 NOTE — FLOWSHEET NOTE
Outpatient Physical Therapy  [place]           [x] Phone: [phone]   Fax: [phone]  Aashish Mendiola           [] Phone: [phone]   Fax: [phone]        Physical Therapy Daily Treatment Note  Date:  2023    Patient Name:  Blanche Johnston, \"Neal\"  :  1980  MRN: [id number]  Restrictions/Precautions: Restrictions/Precautions: None; limit coupled motion into ext/abd/ER   Diagnosis:    Diagnosis: SLAP tear of R shoulder  Date of Injury/Surgery:   Treatment Diagnosis:  R shoulder pain, decreased ROM and strength in R shoulder  Insurance/Certification information: , 12 visits by  23  Referring Physician:   Joon Burton PA-C   Next Doctor Visit:    Plan of care signed (Y/N): Y  Outcome Measure: QuickDASH: 56.8%  Visit# / total visits:   Pain level: 810            Goals:     Patient goals: decrease pain  Short term goals  Time Frame for Short term goals:    See LTG  Long Term Goals  Time Frame for Long Term Goals: 12 visits  Pt will decrease Quick DASH score to 46% or lower to indicate subjective improvement. Pt able to perform Marysol's test for 30 seconds with no increase in symptoms to show improvement in neural symptoms. Pt will be able to lift 25# from waist to chest during a stand pivot transfer to indicate progress towards safe return to work. Pt able to improve shoulder flexion to 160 degs to aid in [place] lifting for work. Pt will improve R shoulder strength to 4/5 to complete work related duties without difficulty. Summary of Evaluation:  Assessment: Pt is a 62-year-old female who presents to PT with R shoulder pain secondary to SLAP tear from an accident at work. Upon assessment, pt has decreased AROM and strength in R shoulder and increased pain in R UE which is limiting pts ability to complete ADLs indep. Pt is currently unable to reach [place], lift using R UE, and has pain with all movements which is not allowing pt to be able to work.  Objectively, pt also presents with

## 2023-05-11 ENCOUNTER — HOSPITAL ENCOUNTER (OUTPATIENT)
Dept: PHYSICAL THERAPY | Age: 43
Discharge: HOME | End: 2023-05-11

## 2023-05-11 NOTE — FLOWSHEET NOTE
Physical Therapy  Cancellation/No-show Note  Patient Name:  Yohan Ashton  :  1980   Date:  2023  Cancelled visits to date: 3  No-shows to date: 2    For today's appointment patient:  [x]  Cancelled  []  Rescheduled appointment  []  No-show     Reason given by patient:  []  Patient ill  []  Conflicting appointment  []  No transportation    []  Conflict with work  []  No reason given  []  Other:     Comments:      Electronically signed by:  Delroy Khan PTA,    2023,4:30 PM

## 2023-05-12 ENCOUNTER — TELEPHONE (OUTPATIENT)
Dept: ORTHOPEDIC SURGERY | Age: 43
End: 2023-05-12

## 2023-05-12 NOTE — TELEPHONE ENCOUNTER
Patient called states that her [facility] rep manager states she has not received her forms for estimated RTW etc. Informed the patient that we have the forms and fax confirmation that it went through to them, however I will refax today to make sure. Patient states understanding.  Lilli Brannon

## 2023-05-16 ENCOUNTER — HOSPITAL ENCOUNTER (OUTPATIENT)
Dept: PHYSICAL THERAPY | Age: 43
Setting detail: THERAPIES SERIES
Discharge: HOME | End: 2023-05-16
Payer: COMMERCIAL

## 2023-05-16 PROCEDURE — 97110 THERAPEUTIC EXERCISES: CPT

## 2023-05-16 NOTE — FLOWSHEET NOTE
pts ability to complete ADLs indep. Pt is currently unable to reach [place], lift using R UE, and has pain with all movements which is not allowing pt to be able to work. Objectively, pt also presents with positive Marysols and [place]s test on R. Based off these tests, positive median nerve tension test, and pain presentation PT suspects possible brachial plexus injury specifically into the median distribution. If pt does not improve with therapy, EMG might be warranted to further investigate symptoms. Pt would benefit from skilled PT to address current impairments, work towards meeting goals to return pt to work, while preventing future injury or further decline. Subjective:  Segun Hiss arrives to therapy stating that the pain today is worse than it has ever been, she woke up this way. She states that the hand and fingers are tingling. Unsure why the pain is so bad today. Any changes in Ambulatory Summary Sheet?   None      Objective: COVID screening questions were asked and patient attested that there had been no contact or symptoms     AROM   Flexion 110° increased pain       Exercises: (No more than 4 columns)   Exercise/Equipment 5/5/23 #5 5/9/23 #6 5/16/2023 #7           WARM UP      UBE - too painful at this time 2' fwd 2' fwd --         TABLE      Table slides  scap 2x10 scap 2x10 --   Scap retraction 2x10 2x10 Too painful    Cane AAROM Flexion 2x10, ER 1x10 Flexion 2x10, ER 1x10    Bicep curl  1# x20 with rest breaks as needed   1# x20 with rest breaks as needed   0# 2*10   AAROM with ball Standing with RSB; flexion x20; scaption x20 Standing with RSB; flexion x20; scaption x20 Standing with RSB, L hand on top of R hand 10* ea  Flex/CW/CCW    [place]dy gripping    [place] 1'          STANDING                                                     PROPRIOCEPTION                                    MODALITIES      Hot pack 10' R shoulder in supine  10' R shoulder seated  10'              Other Therapeutic

## 2023-05-22 ENCOUNTER — OFFICE VISIT (OUTPATIENT)
Dept: ORTHOPEDIC SURGERY | Age: 43
End: 2023-05-22

## 2023-05-22 VITALS
WEIGHT: 215.4 LBS | SYSTOLIC BLOOD PRESSURE: 124 MMHG | HEIGHT: 66 IN | DIASTOLIC BLOOD PRESSURE: 90 MMHG | HEART RATE: 86 BPM | BODY MASS INDEX: 34.62 KG/M2

## 2023-05-22 DIAGNOSIS — M54.12 CERVICAL RADICULOPATHY: ICD-10-CM

## 2023-05-22 DIAGNOSIS — S13.4XXA SPRAIN OF LIGAMENTS OF CERVICAL SPINE, INITIAL ENCOUNTER: Primary | ICD-10-CM

## 2023-05-22 RX ORDER — GABAPENTIN 100 MG/1
100 CAPSULE ORAL 2 TIMES DAILY
Qty: 60 CAPSULE | Refills: 1 | Status: SHIPPED | OUTPATIENT
Start: 2023-05-22 | End: 2023-06-21

## 2023-05-22 ASSESSMENT — ENCOUNTER SYMPTOMS
SHORTNESS OF BREATH: 0
ABDOMINAL PAIN: 0
FACIAL SWELLING: 0
BACK PAIN: 0
STRIDOR: 0
COUGH: 0
EYE REDNESS: 0
VOMITING: 0
WHEEZING: 0
EYE PAIN: 0
PHOTOPHOBIA: 0
NAUSEA: 0
COLOR CHANGE: 0

## 2023-05-22 NOTE — PATIENT INSTRUCTIONS
Gabapentin sent to your pharmacy. Please call and schedule a follow up for your shoulder once you have seen your spine surgeon. [phone].

## 2023-05-22 NOTE — PROGRESS NOTES
Patient is a 38y.o. year old female. Patient is in the office today with right shoulder SLAP tear. Patient states that she injured themselves while transferring a patient with a salomón lift and the lift and the patient fell on her. Patient states that the injury happened 1/2/23. She has previously been seen by Davon Ortega. MRI was completed on 2/23/23. She is currently in PT and states that it is not helping. Pain scale  7-8/10. Her PT is concerned for brachial plexus injury also. Patient reports previous cervical spine surgery. Occupation: Nursing assistant  Dominant Hand: Right    Right shoulder MRI IMPRESSION:  Mild to moderate supraspinatus tendinosis. No rotator cuff tear is  identified. SLAP tear. This extends to the mid posterior labrum. Mild AC joint osteoarthrosis. No acute bony abnormality is seen.

## 2023-05-22 NOTE — PROGRESS NOTES
5/22/2023   Chief Complaint   Patient presents with    Follow-up     Right shoulder pain        History of Present Illness:                             Antoine Peters is a 38 y.o. female right handed patient referred by therapy department for evaluation and treatment right shoulder pain. Patient has a complicated history involving the right shoulder and neck. Patient has a known SLAP tear of the right shoulder as she was seen in our office by LANNY Flores. This was confirmed by MRI of the shoulder in February. She has been in physical therapy working on this issue and states that has not been helping. She has been having significant pain down the right upper extremity in a cervical radiculopathy type pattern as well as pain throughout the proximal right upper extremity and neck. She does have a history of prior cervical spine surgery several years prior by an outside physician. She did reach out to them to try to get in the office for evaluation but is not been able to thus far. The most recent injury occurred in January 2023 when she had a patient fall on her while utilizing a [place] lift at her work. Since then she has had significant pain throughout the right upper extremity. This is my first time seeing the patient. Patient works as a nursing assistant    The pain occurs every day and when active. Location of pain is diffusely throughout shoulder as well as down throughout the upper extremity and into the hand. No history of dislocation. Symptoms are aggravated by ADL's, repetitive use, work at or above shoulder height, difficulty sleeping on affected side. Symptoms are diminished by rest, avoiding the painful activities. Limited activities include: lifting, repetitive use, work at or above shoulder height, difficulty sleeping, difficulty with ADLs. No stiffness is reported. Patient admits to numbness, tingling, altered sensation in the extremity.     Related history: Positive for

## 2023-05-23 ENCOUNTER — TELEPHONE (OUTPATIENT)
Dept: ORTHOPEDIC SURGERY | Age: 43
End: 2023-05-23

## 2023-05-23 DIAGNOSIS — M54.12 CERVICAL RADICULOPATHY: Primary | ICD-10-CM

## 2023-05-23 NOTE — TELEPHONE ENCOUNTER
Patient stated that at her visit. On 05-22-23, Dr. Rhonda Wilkins stated that he would put orders in for her neck if she was unable to get her neurologist to do so. She stated she called her neurologist office, and he will be out of the office for two weeks. She is requesting the orders for her testing be put in by Dr. Rhonda Wilkins, so she is able to get them done sooner. Please advise.  [phone]

## 2023-05-23 NOTE — TELEPHONE ENCOUNTER
Talked with patient to inform her that the x-rays and EMG have been ordered. She voiced her understanding.

## 2023-07-06 ENCOUNTER — TELEPHONE (OUTPATIENT)
Dept: ORTHOPEDIC SURGERY | Age: 43
End: 2023-07-06

## 2023-07-06 NOTE — TELEPHONE ENCOUNTER
Pt called back stating that she did not take the Gabapentin prescribed 5/22/23 and never picked in up due to her being uncomfortable with this medication. She wants something else prescribed. I informed her that we can not prescribe any type of pain medication for this issue. The EMG needs to be completed to know if her symptoms are from her neck vs. Shoulder. She states that she can not return to the spine doctor due multiple missed appts with their practice. I informed her to call her PCP to get a new referral to a new spine doctor under her private insurance since her neck is a non allowed condition under her [facility] claim. I recommended that she call the pharmacy regarding the Gabapentin Rx which was recommended by Dr Batsheva Cedillo. She does not want the Gabapentin. She states that she will call back Monday.

## 2023-07-06 NOTE — TELEPHONE ENCOUNTER
I called [facility] and confirmed that Gabapentin is still on pt's file and available for  if pt desires.

## 2023-07-06 NOTE — TELEPHONE ENCOUNTER
Patient called asking for pain medication. I informed the patient that at her last appointment Dr Gaviota Taylor told her to see a spine doctor and have her EMG completed. Per the patient the EMG is scheduled for 07/28 with Dr Hanny Caldwell and she is not able to return to her Spine doctor. I informed the patient that I could give her a few spine physicians name and numbers and help her get a new one established. Patient wanted pain medication now. I informed the patient that we have not seen her since 05/ 22/2023 and we are unable to give her pain medication at this time, she needs to complete what the provider has instructed her to do. Patient states that Dr Gaviota Taylor can treat her for everything and she does not need another physician. I informed the patient that Dr Gaviota Taylor is unable to help her, this is outside of his scope. Patient became angry and states she needs pain meds and only see Dr Gaviota Taylor. I told her that this is outside of his scope and he has referred her to another provider that can help her, the patient would not listen. The patient hung up.  Lalit Saucedo

## 2023-07-19 ENCOUNTER — HOSPITAL ENCOUNTER (OUTPATIENT)
Age: 43
Discharge: HOME | End: 2023-07-19
Payer: COMMERCIAL

## 2023-07-19 ENCOUNTER — HOSPITAL ENCOUNTER (OUTPATIENT)
Dept: GENERAL RADIOLOGY | Age: 43
Discharge: HOME | End: 2023-07-19
Payer: COMMERCIAL

## 2023-07-19 DIAGNOSIS — S13.4XXA WHIPLASH INJURY TO NECK, INITIAL ENCOUNTER: ICD-10-CM

## 2023-07-19 DIAGNOSIS — S33.5XXA COMPLETE TEAR OF ILIOLUMBAR LIGAMENT: ICD-10-CM

## 2023-07-19 DIAGNOSIS — S23.3XXA SPRAIN OF LIGAMENTS OF THORACIC SPINE, INITIAL ENCOUNTER: ICD-10-CM

## 2023-07-19 DIAGNOSIS — S43.401A SPRAIN OF RIGHT SHOULDER, UNSPECIFIED SHOULDER SPRAIN TYPE, INITIAL ENCOUNTER: ICD-10-CM

## 2023-07-19 DIAGNOSIS — S40.011A CONTUSION OF RIGHT SCAPULA, INITIAL ENCOUNTER: ICD-10-CM

## 2023-07-19 PROCEDURE — 72040 X-RAY EXAM NECK SPINE 2-3 VW: CPT

## 2023-07-19 PROCEDURE — 72050 X-RAY EXAM NECK SPINE 4/5VWS: CPT

## 2023-07-28 ENCOUNTER — PROCEDURE VISIT (OUTPATIENT)
Dept: PHYSICAL MEDICINE AND REHAB | Age: 43
End: 2023-07-28

## 2023-07-28 DIAGNOSIS — M54.2 NECK PAIN ON RIGHT SIDE: ICD-10-CM

## 2023-07-28 DIAGNOSIS — M79.602 PARESTHESIA AND PAIN OF BOTH UPPER EXTREMITIES: ICD-10-CM

## 2023-07-28 DIAGNOSIS — S43.401A SPRAIN OF RIGHT SHOULDER, UNSPECIFIED SHOULDER SPRAIN TYPE, INITIAL ENCOUNTER: Primary | ICD-10-CM

## 2023-07-28 DIAGNOSIS — M79.601 PARESTHESIA AND PAIN OF BOTH UPPER EXTREMITIES: ICD-10-CM

## 2023-07-28 DIAGNOSIS — R20.2 PARESTHESIA AND PAIN OF BOTH UPPER EXTREMITIES: ICD-10-CM
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
## 2023-08-25 ENCOUNTER — OFFICE VISIT (OUTPATIENT)
Dept: ORTHOPEDIC SURGERY | Age: 43
End: 2023-08-25

## 2023-08-25 ENCOUNTER — TELEPHONE (OUTPATIENT)
Dept: ORTHOPEDIC SURGERY | Age: 43
End: 2023-08-25

## 2023-08-25 VITALS
DIASTOLIC BLOOD PRESSURE: 88 MMHG | RESPIRATION RATE: 16 BRPM | HEIGHT: 66 IN | HEART RATE: 88 BPM | SYSTOLIC BLOOD PRESSURE: 128 MMHG | BODY MASS INDEX: 34.77 KG/M2

## 2023-08-25 DIAGNOSIS — S43.431A SUPERIOR LABRUM ANTERIOR-TO-POSTERIOR (SLAP) TEAR OF RIGHT SHOULDER: Primary | ICD-10-CM

## 2023-08-25 ASSESSMENT — ENCOUNTER SYMPTOMS
BACK PAIN: 0
COLOR CHANGE: 0
ABDOMINAL PAIN: 0
EYE PAIN: 0
VOMITING: 0
COUGH: 0
WHEEZING: 0
EYE REDNESS: 0
NAUSEA: 0
PHOTOPHOBIA: 0
FACIAL SWELLING: 0
STRIDOR: 0
SHORTNESS OF BREATH: 0

## 2023-08-25 NOTE — PROGRESS NOTES
Patient seen in office today for [facility] RT shoulder EMG 7/28/23 and x-rays completed 7/19/23. 7-8/10 pain level today. No new concerns or injuries. IMPRESSION:                  1.  Normal electrodiagnostic study of the areas described above. 2.  No evidence of a focal cervical spinal nerve root injury (radiculopathy), plexopathy, isolated peripheral nerve entrapment syndrome, generalized neuropathy or primary muscle disease. IMPRESSION:  1. Mild degenerative changes mid and lower cervical spine. 2. Status post C4-5 and C5-6 discectomy and prosthetic disc placement, no  hardware failure or loosening evident. Unchanged appearance from prior CT. 3. No cervical fracture or listhesis. IMPRESSION:  No evidence of dynamic instability.

## 2023-08-25 NOTE — PATIENT INSTRUCTIONS
Avoid extreme range of motions and high impact activities. Ice and elevate as needed. Tylenol or Motrin for pain. Medication prescribed. We will get approval for surgery through [facility], once we receive approval we will call you to get you scheduled. We will schedule surgery at soonest convenience. If you have any questions regarding your surgery please call our office and ask to speak with Anju Becker [phone]     We are committed to providing you the best care possible. If you receive a survey after visiting one of our offices, please take time to share your experience concerning your physician office visit. These surveys are confidential and no health information about you is shared. We are eager to improve for you and we are counting on your feedback to help make that happen.
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
## 2023-09-19 ENCOUNTER — TELEPHONE (OUTPATIENT)
Dept: ORTHOPEDIC SURGERY | Age: 43
End: 2023-09-19

## 2023-09-19 NOTE — TELEPHONE ENCOUNTER
Scheduled patient for:    Right Shoulder Arthroscopy with Biceps Tenodesis and Debridement  CPT: 21668; 26058  ICD 10: D80.152Q  Surgery Date: 10/3/2023  Surgeon: Radha Sorensen: [facility]  Anesthesia: General Adie Forge Block    Clearances sent to:  PCP: Neeta    This case is [facility], they have only approved CPT codes:   07799 Biceps Tenodesis  35275 Debridement    They are not allowing codes: 65192; 32011

## 2023-09-25 RX ORDER — M-VIT,TX,IRON,MINS/CALC/FOLIC 27MG-0.4MG
1 TABLET ORAL DAILY
COMMUNITY

## 2023-09-25 RX ORDER — NICOTINE 14MG/24HR
PATCH, TRANSDERMAL 24 HOURS TRANSDERMAL
COMMUNITY

## 2023-09-29 ENCOUNTER — TELEPHONE (OUTPATIENT)
Dept: ORTHOPEDIC SURGERY | Age: 43
End: 2023-09-29

## 2023-09-29 NOTE — TELEPHONE ENCOUNTER
Pt called stating that she is in severe pain and attempted to return back to work with restrictions of desk work. She states that she has been doing activities with residents and turning the bingo machine has made her symptoms worse. Pain level 10/10. She does not feel that she is able to work with restrictions since she can not take Vicodin and flexeril while at work.

## 2023-10-01 ENCOUNTER — ANESTHESIA EVENT (OUTPATIENT)
Dept: OPERATING ROOM | Age: 43
End: 2023-10-01
Payer: COMMERCIAL

## 2023-10-01 NOTE — ANESTHESIA PRE PROCEDURE
Department of Anesthesiology  Preprocedure Note       Name:  Jovanna Coulter   Age:  38 y.o.  :  1980                                          MRN:  [id number]         Date:  10/1/2023      Surgeon: Kim Negron):  Antonietta Crump DO    Procedure: Procedure(s):  RIGHT SHOULDER ARTHROSCOPY WITH DEBRIDEMENT  RIGHT BICEPS TENODESIS    Medications prior to admission:   Prior to Admission medications    Medication Sig Start Date End Date Taking? Authorizing Provider   Saccharomyces boulardii (PROBIOTIC) 250 MG CAPS Take by mouth   Yes Historical Provider, MD   Multiple Vitamins-Minerals (THERAPEUTIC MULTIVITAMIN-MINERALS) tablet Take 1 tablet by mouth daily   Yes Historical Provider, MD   gabapentin (NEURONTIN) 100 MG capsule Take 1 capsule by mouth 2 times daily for 30 days. 23  Antonietta Crump DO   cyclobenzaprine (FLEXERIL) 10 MG tablet Take 1 tablet by mouth nightly 3/9/23   Historical Provider, MD   hydroCHLOROthiazide (HYDRODIURIL) 25 MG tablet take 1 tablet by oral route every day 3/9/23   Historical Provider, MD   hydrOXYzine pamoate (VISTARIL) 25 MG capsule Take 1 capsule by mouth 3 times daily as needed for Itching    Historical Provider, MD   ondansetron (ZOFRAN) 4 MG tablet Take 1 tablet by mouth every 8 hours as needed for Nausea  Patient not taking: Reported on 2023   Moshe Linton DO   naproxen (NAPROSYN) 500 MG tablet Take 1 tablet by mouth 2 times daily  Patient not taking: Reported on 2023   ALONSO Carrion - CNP   ibuprofen (ADVIL;MOTRIN) 600 MG tablet Take 1 tablet by mouth 3 times daily as needed for Pain 10/7/22   Juan Phipps MD   Dextromethorphan-guaiFENesin 5-100 MG/5ML LIQD Take 5 mLs by mouth every 8 hours as needed (congestion, cough)  Patient not taking: Reported on 2023   Radha Waterman DO   methylPREDNISolone (MEDROL, HARI,) 4 MG tablet Take by mouth.   Patient not taking: Reported on 2023   Alec Baer

## 2023-10-02 NOTE — TELEPHONE ENCOUNTER
I called pt and informed her that Dr Amebr Santos took her off of work until surgery. New [employer] needed starting 10/2/23. Form will be faxed with op note once complete.

## 2023-10-02 NOTE — PROGRESS NOTES
Patient notified of surgery time at [facility] 10/3/23 1045 arrival 0845, NPO instructions and DOS meds reviewed

## 2023-10-03 ENCOUNTER — ANESTHESIA (OUTPATIENT)
Dept: OPERATING ROOM | Age: 43
End: 2023-10-03
Payer: COMMERCIAL

## 2023-10-03 ENCOUNTER — HOSPITAL ENCOUNTER (OUTPATIENT)
Age: 43
Setting detail: OUTPATIENT SURGERY
Discharge: HOME | End: 2023-10-03
Attending: STUDENT IN AN ORGANIZED HEALTH CARE EDUCATION/TRAINING PROGRAM | Admitting: STUDENT IN AN ORGANIZED HEALTH CARE EDUCATION/TRAINING PROGRAM
Payer: COMMERCIAL

## 2023-10-03 VITALS
WEIGHT: 212 LBS | DIASTOLIC BLOOD PRESSURE: 82 MMHG | RESPIRATION RATE: 18 BRPM | HEART RATE: 100 BPM | BODY MASS INDEX: 34.07 KG/M2 | TEMPERATURE: 97 F | HEIGHT: 66 IN | SYSTOLIC BLOOD PRESSURE: 138 MMHG | OXYGEN SATURATION: 96 %

## 2023-10-03 DIAGNOSIS — S43.431A SUPERIOR LABRUM ANTERIOR-TO-POSTERIOR (SLAP) TEAR OF RIGHT SHOULDER: Primary | ICD-10-CM

## 2023-10-03 LAB — PREGNANCY TEST URINE, POC: NEGATIVE

## 2023-10-03 PROCEDURE — 2720000010 HC SURG SUPPLY STERILE: Performed by: STUDENT IN AN ORGANIZED HEALTH CARE EDUCATION/TRAINING PROGRAM

## 2023-10-03 PROCEDURE — 6370000000 HC RX 637 (ALT 250 FOR IP): Performed by: STUDENT IN AN ORGANIZED HEALTH CARE EDUCATION/TRAINING PROGRAM

## 2023-10-03 PROCEDURE — 6360000002 HC RX W HCPCS: Performed by: ANESTHESIOLOGY

## 2023-10-03 PROCEDURE — 3700000001 HC ADD 15 MINUTES (ANESTHESIA): Performed by: STUDENT IN AN ORGANIZED HEALTH CARE EDUCATION/TRAINING PROGRAM

## 2023-10-03 PROCEDURE — 2580000003 HC RX 258: Performed by: STUDENT IN AN ORGANIZED HEALTH CARE EDUCATION/TRAINING PROGRAM

## 2023-10-03 PROCEDURE — 6360000002 HC RX W HCPCS

## 2023-10-03 PROCEDURE — 6370000000 HC RX 637 (ALT 250 FOR IP): Performed by: ANESTHESIOLOGY

## 2023-10-03 PROCEDURE — 6360000002 HC RX W HCPCS: Performed by: STUDENT IN AN ORGANIZED HEALTH CARE EDUCATION/TRAINING PROGRAM

## 2023-10-03 PROCEDURE — 3600000013 HC SURGERY LEVEL 3 ADDTL 15MIN: Performed by: STUDENT IN AN ORGANIZED HEALTH CARE EDUCATION/TRAINING PROGRAM

## 2023-10-03 PROCEDURE — 3600000003 HC SURGERY LEVEL 3 BASE: Performed by: STUDENT IN AN ORGANIZED HEALTH CARE EDUCATION/TRAINING PROGRAM

## 2023-10-03 PROCEDURE — 2500000003 HC RX 250 WO HCPCS

## 2023-10-03 PROCEDURE — 81025 URINE PREGNANCY TEST: CPT

## 2023-10-03 PROCEDURE — 2709999900 HC NON-CHARGEABLE SUPPLY: Performed by: STUDENT IN AN ORGANIZED HEALTH CARE EDUCATION/TRAINING PROGRAM

## 2023-10-03 PROCEDURE — 7100000001 HC PACU RECOVERY - ADDTL 15 MIN: Performed by: STUDENT IN AN ORGANIZED HEALTH CARE EDUCATION/TRAINING PROGRAM

## 2023-10-03 PROCEDURE — 2580000003 HC RX 258: Performed by: ANESTHESIOLOGY

## 2023-10-03 PROCEDURE — 7100000010 HC PHASE II RECOVERY - FIRST 15 MIN: Performed by: STUDENT IN AN ORGANIZED HEALTH CARE EDUCATION/TRAINING PROGRAM

## 2023-10-03 PROCEDURE — C1713 ANCHOR/SCREW BN/BN,TIS/BN: HCPCS | Performed by: STUDENT IN AN ORGANIZED HEALTH CARE EDUCATION/TRAINING PROGRAM

## 2023-10-03 PROCEDURE — 3700000000 HC ANESTHESIA ATTENDED CARE: Performed by: STUDENT IN AN ORGANIZED HEALTH CARE EDUCATION/TRAINING PROGRAM

## 2023-10-03 PROCEDURE — 7100000000 HC PACU RECOVERY - FIRST 15 MIN: Performed by: STUDENT IN AN ORGANIZED HEALTH CARE EDUCATION/TRAINING PROGRAM

## 2023-10-03 PROCEDURE — 64415 NJX AA&/STRD BRCH PLXS IMG: CPT | Performed by: ANESTHESIOLOGY

## 2023-10-03 PROCEDURE — 7100000011 HC PHASE II RECOVERY - ADDTL 15 MIN: Performed by: STUDENT IN AN ORGANIZED HEALTH CARE EDUCATION/TRAINING PROGRAM

## 2023-10-03 DEVICE — ANCHOR SUTURE BIOCOMP 4.75X19.1 MM SWIVELOCK C: Type: IMPLANTABLE DEVICE | Site: SHOULDER | Status: FUNCTIONAL

## 2023-10-03 RX ORDER — BUPIVACAINE HYDROCHLORIDE 5 MG/ML
INJECTION, SOLUTION EPIDURAL; INTRACAUDAL
Status: COMPLETED | OUTPATIENT
Start: 2023-10-03 | End: 2023-10-03

## 2023-10-03 RX ORDER — FENTANYL CITRATE 50 UG/ML
25 INJECTION, SOLUTION INTRAMUSCULAR; INTRAVENOUS EVERY 5 MIN PRN
Status: DISCONTINUED | OUTPATIENT
Start: 2023-10-03 | End: 2023-10-03 | Stop reason: HOSPADM

## 2023-10-03 RX ORDER — ESMOLOL HYDROCHLORIDE 10 MG/ML
INJECTION INTRAVENOUS PRN
Status: DISCONTINUED | OUTPATIENT
Start: 2023-10-03 | End: 2023-10-03 | Stop reason: SDUPTHER

## 2023-10-03 RX ORDER — SODIUM CHLORIDE, SODIUM LACTATE, POTASSIUM CHLORIDE, CALCIUM CHLORIDE 600; 310; 30; 20 MG/100ML; MG/100ML; MG/100ML; MG/100ML
INJECTION, SOLUTION INTRAVENOUS CONTINUOUS
Status: CANCELLED | OUTPATIENT
Start: 2023-10-03

## 2023-10-03 RX ORDER — OXYCODONE HYDROCHLORIDE 5 MG/1
5 TABLET ORAL
Status: COMPLETED | OUTPATIENT
Start: 2023-10-03 | End: 2023-10-03

## 2023-10-03 RX ORDER — SODIUM CHLORIDE 0.9 % (FLUSH) 0.9 %
5-40 SYRINGE (ML) INJECTION PRN
Status: DISCONTINUED | OUTPATIENT
Start: 2023-10-03 | End: 2023-10-03 | Stop reason: HOSPADM

## 2023-10-03 RX ORDER — ONDANSETRON 2 MG/ML
INJECTION INTRAMUSCULAR; INTRAVENOUS PRN
Status: DISCONTINUED | OUTPATIENT
Start: 2023-10-03 | End: 2023-10-03 | Stop reason: SDUPTHER

## 2023-10-03 RX ORDER — SODIUM CHLORIDE 0.9 % (FLUSH) 0.9 %
5-40 SYRINGE (ML) INJECTION EVERY 12 HOURS SCHEDULED
Status: CANCELLED | OUTPATIENT
Start: 2023-10-03

## 2023-10-03 RX ORDER — ACETAMINOPHEN 500 MG
1000 TABLET ORAL ONCE
Status: COMPLETED | OUTPATIENT
Start: 2023-10-03 | End: 2023-10-03

## 2023-10-03 RX ORDER — ONDANSETRON 2 MG/ML
4 INJECTION INTRAMUSCULAR; INTRAVENOUS EVERY 6 HOURS PRN
Status: CANCELLED | OUTPATIENT
Start: 2023-10-03

## 2023-10-03 RX ORDER — MIDAZOLAM HYDROCHLORIDE 1 MG/ML
INJECTION INTRAMUSCULAR; INTRAVENOUS PRN
Status: DISCONTINUED | OUTPATIENT
Start: 2023-10-03 | End: 2023-10-03 | Stop reason: SDUPTHER

## 2023-10-03 RX ORDER — ONDANSETRON 4 MG/1
4 TABLET, ORALLY DISINTEGRATING ORAL EVERY 8 HOURS PRN
Status: CANCELLED | OUTPATIENT
Start: 2023-10-03

## 2023-10-03 RX ORDER — DROPERIDOL 2.5 MG/ML
0.62 INJECTION, SOLUTION INTRAMUSCULAR; INTRAVENOUS EVERY 10 MIN PRN
Status: DISCONTINUED | OUTPATIENT
Start: 2023-10-03 | End: 2023-10-03 | Stop reason: HOSPADM

## 2023-10-03 RX ORDER — MIDAZOLAM HYDROCHLORIDE 1 MG/ML
INJECTION INTRAMUSCULAR; INTRAVENOUS
Status: COMPLETED
Start: 2023-10-03 | End: 2023-10-03

## 2023-10-03 RX ORDER — DEXAMETHASONE SODIUM PHOSPHATE 4 MG/ML
INJECTION, SOLUTION INTRA-ARTICULAR; INTRALESIONAL; INTRAMUSCULAR; INTRAVENOUS; SOFT TISSUE PRN
Status: DISCONTINUED | OUTPATIENT
Start: 2023-10-03 | End: 2023-10-03 | Stop reason: SDUPTHER

## 2023-10-03 RX ORDER — ONDANSETRON 4 MG/1
4 TABLET, ORALLY DISINTEGRATING ORAL 3 TIMES DAILY PRN
Qty: 21 TABLET | Refills: 0 | Status: SHIPPED | OUTPATIENT
Start: 2023-10-03

## 2023-10-03 RX ORDER — KETOROLAC TROMETHAMINE 30 MG/ML
30 INJECTION, SOLUTION INTRAMUSCULAR; INTRAVENOUS EVERY 6 HOURS
Status: CANCELLED | OUTPATIENT
Start: 2023-10-03 | End: 2023-10-06

## 2023-10-03 RX ORDER — ROCURONIUM BROMIDE 10 MG/ML
INJECTION, SOLUTION INTRAVENOUS PRN
Status: DISCONTINUED | OUTPATIENT
Start: 2023-10-03 | End: 2023-10-03 | Stop reason: SDUPTHER

## 2023-10-03 RX ORDER — SODIUM CHLORIDE 0.9 % (FLUSH) 0.9 %
5-40 SYRINGE (ML) INJECTION EVERY 12 HOURS SCHEDULED
Status: DISCONTINUED | OUTPATIENT
Start: 2023-10-03 | End: 2023-10-03 | Stop reason: HOSPADM

## 2023-10-03 RX ORDER — ONDANSETRON 2 MG/ML
4 INJECTION INTRAMUSCULAR; INTRAVENOUS
Status: DISCONTINUED | OUTPATIENT
Start: 2023-10-03 | End: 2023-10-03 | Stop reason: HOSPADM

## 2023-10-03 RX ORDER — DIPHENHYDRAMINE HYDROCHLORIDE 50 MG/ML
12.5 INJECTION INTRAMUSCULAR; INTRAVENOUS
Status: DISCONTINUED | OUTPATIENT
Start: 2023-10-03 | End: 2023-10-03 | Stop reason: HOSPADM

## 2023-10-03 RX ORDER — SODIUM CHLORIDE 0.9 % (FLUSH) 0.9 %
5-40 SYRINGE (ML) INJECTION PRN
Status: CANCELLED | OUTPATIENT
Start: 2023-10-03

## 2023-10-03 RX ORDER — SODIUM CHLORIDE 9 MG/ML
INJECTION, SOLUTION INTRAVENOUS PRN
Status: DISCONTINUED | OUTPATIENT
Start: 2023-10-03 | End: 2023-10-03 | Stop reason: HOSPADM

## 2023-10-03 RX ORDER — SODIUM CHLORIDE, SODIUM LACTATE, POTASSIUM CHLORIDE, CALCIUM CHLORIDE 600; 310; 30; 20 MG/100ML; MG/100ML; MG/100ML; MG/100ML
INJECTION, SOLUTION INTRAVENOUS CONTINUOUS
Status: DISCONTINUED | OUTPATIENT
Start: 2023-10-03 | End: 2023-10-03 | Stop reason: HOSPADM

## 2023-10-03 RX ORDER — SCOLOPAMINE TRANSDERMAL SYSTEM 1 MG/1
1 PATCH, EXTENDED RELEASE TRANSDERMAL
Status: DISCONTINUED | OUTPATIENT
Start: 2023-10-03 | End: 2023-10-03 | Stop reason: HOSPADM

## 2023-10-03 RX ORDER — SODIUM CHLORIDE 9 MG/ML
INJECTION, SOLUTION INTRAVENOUS PRN
Status: CANCELLED | OUTPATIENT
Start: 2023-10-03

## 2023-10-03 RX ORDER — LABETALOL HYDROCHLORIDE 5 MG/ML
10 INJECTION, SOLUTION INTRAVENOUS
Status: DISCONTINUED | OUTPATIENT
Start: 2023-10-03 | End: 2023-10-03 | Stop reason: HOSPADM

## 2023-10-03 RX ORDER — OXYCODONE HYDROCHLORIDE AND ACETAMINOPHEN 5; 325 MG/1; MG/1
1 TABLET ORAL EVERY 6 HOURS PRN
Qty: 28 TABLET | Refills: 0 | Status: SHIPPED | OUTPATIENT
Start: 2023-10-03 | End: 2023-10-10

## 2023-10-03 RX ORDER — PROPOFOL 10 MG/ML
INJECTION, EMULSION INTRAVENOUS PRN
Status: DISCONTINUED | OUTPATIENT
Start: 2023-10-03 | End: 2023-10-03 | Stop reason: SDUPTHER

## 2023-10-03 RX ORDER — BUPIVACAINE HYDROCHLORIDE 5 MG/ML
INJECTION, SOLUTION EPIDURAL; INTRACAUDAL
Status: COMPLETED
Start: 2023-10-03 | End: 2023-10-03

## 2023-10-03 RX ORDER — HYDRALAZINE HYDROCHLORIDE 20 MG/ML
10 INJECTION INTRAMUSCULAR; INTRAVENOUS
Status: DISCONTINUED | OUTPATIENT
Start: 2023-10-03 | End: 2023-10-03 | Stop reason: HOSPADM

## 2023-10-03 RX ORDER — LIDOCAINE HYDROCHLORIDE 20 MG/ML
INJECTION, SOLUTION INTRAVENOUS PRN
Status: DISCONTINUED | OUTPATIENT
Start: 2023-10-03 | End: 2023-10-03 | Stop reason: SDUPTHER

## 2023-10-03 RX ORDER — OXYCODONE HYDROCHLORIDE 5 MG/1
10 TABLET ORAL EVERY 4 HOURS PRN
Status: CANCELLED | OUTPATIENT
Start: 2023-10-03

## 2023-10-03 RX ORDER — OXYCODONE HYDROCHLORIDE 5 MG/1
5 TABLET ORAL EVERY 4 HOURS PRN
Status: CANCELLED | OUTPATIENT
Start: 2023-10-03

## 2023-10-03 RX ADMIN — CEFAZOLIN 2000 MG: 2 INJECTION, POWDER, FOR SOLUTION INTRAMUSCULAR; INTRAVENOUS at 10:43

## 2023-10-03 RX ADMIN — ACETAMINOPHEN 1000 MG: 500 TABLET ORAL at 09:52

## 2023-10-03 RX ADMIN — DEXAMETHASONE SODIUM PHOSPHATE 4 MG: 4 INJECTION, SOLUTION INTRAMUSCULAR; INTRAVENOUS at 11:07

## 2023-10-03 RX ADMIN — OXYCODONE 5 MG: 5 TABLET ORAL at 13:57

## 2023-10-03 RX ADMIN — SCOLOPAMINE TRANSDERMAL SYSTEM 1 PATCH: 1 PATCH, EXTENDED RELEASE TRANSDERMAL at 10:35

## 2023-10-03 RX ADMIN — ONDANSETRON 4 MG: 2 INJECTION INTRAMUSCULAR; INTRAVENOUS at 11:07

## 2023-10-03 RX ADMIN — BUPIVACAINE HYDROCHLORIDE 14 ML: 5 INJECTION EPIDURAL; INTRACAUDAL; PERINEURAL at 10:00

## 2023-10-03 RX ADMIN — ROCURONIUM BROMIDE 50 MG: 10 INJECTION, SOLUTION INTRAVENOUS at 10:35

## 2023-10-03 RX ADMIN — ESMOLOL HYDROCHLORIDE 50 MG: 10 INJECTION, SOLUTION INTRAVENOUS at 11:34

## 2023-10-03 RX ADMIN — SODIUM CHLORIDE, POTASSIUM CHLORIDE, SODIUM LACTATE AND CALCIUM CHLORIDE: 600; 310; 30; 20 INJECTION, SOLUTION INTRAVENOUS at 09:47

## 2023-10-03 RX ADMIN — FENTANYL CITRATE 25 MCG: 50 INJECTION INTRAMUSCULAR; INTRAVENOUS at 13:19

## 2023-10-03 RX ADMIN — PROPOFOL 150 MG: 10 INJECTION, EMULSION INTRAVENOUS at 10:35

## 2023-10-03 RX ADMIN — LIDOCAINE HYDROCHLORIDE 50 MG: 20 INJECTION, SOLUTION INTRAVENOUS at 10:35

## 2023-10-03 RX ADMIN — SUGAMMADEX 200 MG: 100 INJECTION, SOLUTION INTRAVENOUS at 12:21

## 2023-10-03 RX ADMIN — ROCURONIUM BROMIDE 10 MG: 10 INJECTION, SOLUTION INTRAVENOUS at 11:45

## 2023-10-03 RX ADMIN — MIDAZOLAM 2 MG: 1 INJECTION INTRAMUSCULAR; INTRAVENOUS at 10:00

## 2023-10-03 ASSESSMENT — PAIN DESCRIPTION - FREQUENCY: FREQUENCY: INTERMITTENT

## 2023-10-03 ASSESSMENT — ENCOUNTER SYMPTOMS
FACIAL SWELLING: 0
NAUSEA: 0
ABDOMINAL PAIN: 0
COLOR CHANGE: 0
VOMITING: 0
SHORTNESS OF BREATH: 0
WHEEZING: 0
COUGH: 0
PHOTOPHOBIA: 0
STRIDOR: 0
BACK PAIN: 0
EYE PAIN: 0
EYE REDNESS: 0

## 2023-10-03 ASSESSMENT — PAIN SCALES - GENERAL
PAINLEVEL_OUTOF10: 8
PAINLEVEL_OUTOF10: 4
PAINLEVEL_OUTOF10: 8
PAINLEVEL_OUTOF10: 6
PAINLEVEL_OUTOF10: 0

## 2023-10-03 ASSESSMENT — PAIN DESCRIPTION - LOCATION
LOCATION: ABDOMEN
LOCATION: BACK
LOCATION: ABDOMEN

## 2023-10-03 ASSESSMENT — PAIN DESCRIPTION - ORIENTATION
ORIENTATION: RIGHT

## 2023-10-03 ASSESSMENT — PAIN - FUNCTIONAL ASSESSMENT
PAIN_FUNCTIONAL_ASSESSMENT: PREVENTS OR INTERFERES SOME ACTIVE ACTIVITIES AND ADLS
PAIN_FUNCTIONAL_ASSESSMENT: 0-10
PAIN_FUNCTIONAL_ASSESSMENT: PREVENTS OR INTERFERES SOME ACTIVE ACTIVITIES AND ADLS

## 2023-10-03 ASSESSMENT — PAIN DESCRIPTION - DESCRIPTORS
DESCRIPTORS: SHARP
DESCRIPTORS: ACHING
DESCRIPTORS: ACHING

## 2023-10-03 NOTE — H&P
10/3/2023  No chief complaint on file. Updated HPI: Patient presents today to undergo right shoulder surgery as previously discussed in office. She has no questions about today's planned procedure. She denies any new complaints or injuries. Denies any changes in her health history. Denies any constitutional symptoms today. Previous HPI (8/25/2023): Patient returns today for reevaluation of her right shoulder. She continues to have cervical spine pain but also pain into the shoulder at the glenohumeral joint, around the SLAP region where she has a known injury. No new injuries or complaints since last being seen. She continues to have issues with shoulder range of motion and activities such as putting on her bra or weightbearing. Again no new complaints. No instability events. Patient has attempted a subacromial injection previously by an outside physician which she had not mention prior. Physical therapy had also been attempted previously with no improvement. No change in her symptoms but she does state that her numbness is improved. Previous HPI (5/22/2023):                             Puneet Ortiz is a 38 y.o. female right handed patient referred by therapy department for evaluation and treatment right shoulder pain. Patient has a complicated history involving the right shoulder and neck. Patient has a known SLAP tear of the right shoulder as she was seen in our office by LANNY Whitney. This was confirmed by MRI of the shoulder in February. She has been in physical therapy working on this issue and states that has not been helping. She has been having significant pain down the right upper extremity in a cervical radiculopathy type pattern as well as pain throughout the proximal right upper extremity and neck. She does have a history of prior cervical spine surgery several years prior by an outside physician.   She did reach out to them to try to get in the office for

## 2023-10-03 NOTE — OP NOTE
Operative Note      Patient: Meir Guzman  YOB: 1980  MRN: [id number]    Date of Procedure: 10/3/2023    Pre-Op Diagnosis Codes:     * Superior glenoid labrum lesion of right shoulder, initial encounter [S43.431A]    Post-Op Diagnosis: Right shoulder SLAP lesion with biceps tendinitis, AC joint osteoarthritis, subacromial bursitis, loose body, labral degeneration       Procedure(s):  RIGHT SHOULDER ARTHROSCOPY WITH DEBRIDEMENT, SUBACROMIAL DECOMPRESSION, DISTAL CLAVICLE EXCISION, LOOSE BODY REMOVAL  RIGHT BICEPS TENODESIS    Surgeon(s):  Karsten Gomez DO    Assistant:   * No surgical staff found *    Anesthesia: General with peripheral nerve block    Estimated Blood Loss (mL): Minimal    Complications: None    Specimens:   * No specimens in log *    Implants:  Implant Name Type Inv. Item Serial No.  Lot No. LRB No. Used Action   ANCHOR SUTURE BIOCOMP 4.75X19.1 MM Sharlet Patches [id number]  [facility] SUTURE BIOCOMP 4.75X19.1 MM [employer] [id number] Right 1 Implanted         Drains: * No LDAs found *    Findings: Please see dictated op report        Detailed Description of Procedure:     PREOPERATIVE DIAGNOSES:     Right side    1. Shoulder rotator cuff tendinitis    2. Shoulder biceps tendonitis    3. Shoulder synovitis    4. Shoulder SLAP    5. Shoulder impingement, bursitis    6. Acromioclavicular joint osteoarthritis          POSTOPERATIVE DIAGNOSES:     Right side    1. Shoulder rotator cuff tendinitis    2. Shoulder biceps tendonitis    3. Shoulder synovitis    4. Shoulder SLAP    5. Shoulder impingement, bursitis    6. Acromioclavicular joint osteoarthritis    7. Loose body         OPERATION:     Right side    1. Shoulder biceps tenodesis - arthroscopic    2. Shoulder arthroscopic distal clavicle excision    3. Shoulder arthroscopic subacromial decompression, bursectomy     4.  Shoulder arthroscopic extensive debridement (anterior, posterior glenohumeral

## 2023-10-03 NOTE — PROGRESS NOTES
1238- pt.arrived to pacu via cart from OR. Pt. Attached to monitor and alarms are on. Received report from Cristino portillo CRNA and Holly Harris, [address] Pt. Is drowsy from anesthesia but responds to verbal stimuli and able to follow commands. Pt. Denies pain or n/v. Pt. Had a nerve block prior to surgery. Pt. Is able to wiggle fingers on right hand. Pt. Radial pulse is +3. Pt. Dressing noted to have drainage and shadowing to right shoulder. No breakthrough noted. Pt. Is wearing a simple mask at 6L. 1240- pt. Moving and twisting in bed. Education and encouragement provided to pt. About safety. 1309- pt. Stating she is nauseas. Pt. Educated on interventions available. Pt. Verbalized understanding. This RN was about to give zofran. Pt. Stopped this RN prior to administration and state does not want the zofran. Pt. States this medicine is too much like phenergan and cant take it. Educated pt. On zofran. Pt. Verbalized understanding but still refuses. Pt. States does not want anything  1316- pt. Stating having pain in right side/back. Pt. States shoulder is numb. Pt. Educated on pain intervention available. Pt. Changes her mind about pain medication and will say she wants something but then say never mind. Pt. Educated on pain medication side effects. Pt. Ricky Russell understanding and states does not want to take anything that makes her sleepy. Pt. Educated on pain medication available and she stated would try PRN fentanyl for pain.    1320- gave report to Allen Peña

## 2023-10-03 NOTE — PROGRESS NOTES
Dressing to rt shoulder saturated with serosang drainage, Dr Janay Jameson informed  [phone] dressing removed, dressing replaced with dry 4x4 sterile gauze and ABD pads. Pt instructed to leave in place for 2-3 days, if dressing becomes completely saturated, notify Dr Janay Jameson. [phone] assisted pt to get dressed, and then up to wheelchair for discharge  1550 pt transported to main entrance via wheelchair for discharge with family.

## 2023-10-03 NOTE — ANESTHESIA POSTPROCEDURE EVALUATION
Department of Anesthesiology  Postprocedure Note    Patient: Harish Miller  MRN: [id number]  YOB: 1980  Date of evaluation: 10/3/2023      Procedure Summary     Date: 10/03/23 Room / Location: [address] [place] [address]    Anesthesia Start: 1028 Anesthesia Stop: 9453    Procedures:       RIGHT SHOULDER ARTHROSCOPY WITH DEBRIDEMENT, SUBACROMIAL DECOMPRESSION, DISTAL CLAVICLE EXCISION, LOOSE BODY REMOVAL (Right: Shoulder)      RIGHT BICEPS TENODESIS (Right: Shoulder) Diagnosis:       Superior glenoid labrum lesion of right shoulder, initial encounter      (Superior glenoid labrum lesion of right shoulder, initial encounter [Z25.218J])    Surgeons: Edgar Guzman DO Responsible Provider: Yanet Yost II, DO    Anesthesia Type: General ASA Status: 3          Anesthesia Type: General    Demetri Phase I: Demetri Score: 10    Demetri Phase II: Demetri Score: 10      Anesthesia Post Evaluation    Patient location during evaluation: bedside  Patient participation: complete - patient participated  Level of consciousness: awake  Pain score: 3  Airway patency: patent  Nausea & Vomiting: no vomiting and no nausea  Complications: no  Cardiovascular status: hemodynamically stable  Respiratory status: acceptable, airway suctioned, spontaneous ventilation and room air  Hydration status: stable  Pain management: adequate

## 2023-10-03 NOTE — ANESTHESIA PROCEDURE NOTES
Peripheral Block    Patient location during procedure: pre-op  Reason for block: post-op pain management and at surgeon's request  Start time: 10/3/2023 10:00 AM  End time: 10/3/2023 10:10 AM  Staffing  Performed: anesthesiologist   Anesthesiologist: Scott Gottron, DO  Resident/CRNA: ALONSO Mccord CRNA  Performed by: ALONSO Mccord - CRNA  Authorized by: Adam Mckeon II, DO    Preanesthetic Checklist  Completed: patient identified, IV checked, site marked, risks and benefits discussed, surgical/procedural consents, equipment checked, pre-op evaluation, timeout performed, anesthesia consent given, oxygen available, monitors applied/VS acknowledged, fire risk safety assessment completed and verbalized and blood product R/B/A discussed and consented  Peripheral Block   Patient position: sitting  Prep: ChloraPrep  Provider prep: mask and sterile gloves  Patient monitoring: cardiac monitor, continuous pulse ox, frequent blood pressure checks, IV access, oxygen and responsive to questions  Block type: Brachial plexus  Interscalene  Laterality: right  Injection technique: single-shot  Guidance: ultrasound guided    Needle   Needle type: insulated echogenic nerve stimulator needle   Needle localization: ultrasound guidance and anatomical landmarks  Assessment   Injection assessment: negative aspiration for heme, no paresthesia on injection, local visualized surrounding nerve on ultrasound and no intravascular symptoms  Slow fractionated injection: yes  Hemodynamics: stable  Real-time US image taken/store: yes  Outcomes: uncomplicated and patient tolerated procedure well    Medications Administered  bupivacaine (MARCAINE) PF injection 0.5% - Perineural   14 mL - 10/3/2023 10:00:00 AM

## 2023-10-03 NOTE — DISCHARGE INSTRUCTIONS
[facility]  [phone]    Do not drive, work around [address] or use equipment. Do not drink any alcoholic beverages. Do not smoke while alone. Avoid making important decisions. Plan to spend a quiet, relaxed evening @ home. Resume normal activities as you begin to feel better. Eat lightly for your first meal, then gradually increase your diet to what is normal for you. In case of nausea, avoid food and drink only clear liquids. Resume food as nausea ceases. Notify your surgeon if you experience fever, chills, large amount of bleeding, difficulty breathing, persistent nausea and vomiting or any other disturbing problem. Call for a follow-up appointment with your surgeon. [facility]  [phone]    Do not drive, work around [address] or use equipment. Do not drink any alcoholic beverages. Do not smoke while alone. Avoid making important decisions. Plan to spend a quiet, relaxed evening @ home. Resume normal activities as you begin to feel better. Eat lightly for your first meal, then gradually increase your diet to what is normal for you. In case of nausea, avoid food and drink only clear liquids. Resume food as nausea ceases. Notify your surgeon if you experience fever, chills, large amount of bleeding, difficulty breathing, persistent nausea and vomiting or any other disturbing problem. Call for a follow-up appointment with your surgeon.

## 2023-10-16 ENCOUNTER — OFFICE VISIT (OUTPATIENT)
Dept: ORTHOPEDIC SURGERY | Age: 43
End: 2023-10-16

## 2023-10-16 VITALS — OXYGEN SATURATION: 97 % | SYSTOLIC BLOOD PRESSURE: 148 MMHG | DIASTOLIC BLOOD PRESSURE: 90 MMHG | HEART RATE: 68 BPM

## 2023-10-16 DIAGNOSIS — Z09 POSTOP CHECK: Primary | ICD-10-CM

## 2023-10-16 DIAGNOSIS — S43.431A SUPERIOR LABRUM ANTERIOR-TO-POSTERIOR (SLAP) TEAR OF RIGHT SHOULDER: ICD-10-CM

## 2023-10-16 RX ORDER — OXYCODONE HYDROCHLORIDE AND ACETAMINOPHEN 5; 325 MG/1; MG/1
1 TABLET ORAL EVERY 6 HOURS PRN
Qty: 28 TABLET | Refills: 0 | Status: SHIPPED | OUTPATIENT
Start: 2023-10-16 | End: 2023-10-23

## 2023-10-16 NOTE — PROGRESS NOTES
Date of surgery: 10-3-2023     Procedure:  Right side  1. Shoulder biceps tenodesis - arthroscopic  2. Shoulder arthroscopic distal clavicle excision  3. Shoulder arthroscopic subacromial decompression, bursectomy   4. Shoulder arthroscopic extensive debridement (anterior, posterior glenohumeral joint, subacromial space) (CPT 08836)  5. Shoulder arthroscopic labral debridement (CPT 79106)  6. Shoulder arthroscopic lysis of adhesions (CPT 97964)  7. Loose body removal      History:  Patient is here in follow up regarding their 2-week postop appointment for above procedure    Patient is doing well. They have improving 3/10 pain. They deny chest pain, SOB, calf pain,fever,wound drainage. No other issues. Patient denies any constitutional symptoms. Patient states they have been compliant with restrictions. Patient states they have been using their sling as ordered. Physical:   Patient demonstrates appropriate mood and affect. Right upper extremity exam:   The incisions are well approximated and are clean, dry, intact, and nontender with no erythema. They have no edema, the Arm compartments are soft . There are No cords or calf tenderness. No significant calf/ankle edema. They are neurovascularly intact distally. Sling removed for examination    Sutures removed without issue    Range of motion of the right shoulder not assessed at this time     No areas of tenderness to palpation    Negative Spencer's    Imaging:   No new orthopedic imaging     Impression: Status post above, doing well        Plan:   -Intraoperative arthroscopic imaging reviewed with patient. I provided reassurance today that the patient is doing well and there are no concerning findings.  -Patient placed back into simple sling  -Formal physical therapy order placed today  -Continue sling at this time but will likely be out of the sling by the next time she follows up in 4 weeks.   Range of motion and lifting restrictions per the

## 2023-10-27 ENCOUNTER — APPOINTMENT (OUTPATIENT)
Dept: GENERAL RADIOLOGY | Age: 43
End: 2023-10-27
Payer: COMMERCIAL

## 2023-10-27 ENCOUNTER — HOSPITAL ENCOUNTER (EMERGENCY)
Age: 43
Discharge: HOME | End: 2023-10-27
Attending: EMERGENCY MEDICINE
Payer: COMMERCIAL

## 2023-10-27 VITALS
TEMPERATURE: 98 F | OXYGEN SATURATION: 98 % | SYSTOLIC BLOOD PRESSURE: 122 MMHG | BODY MASS INDEX: 37.3 KG/M2 | RESPIRATION RATE: 17 BRPM | DIASTOLIC BLOOD PRESSURE: 96 MMHG | HEIGHT: 60 IN | WEIGHT: 190 LBS | HEART RATE: 87 BPM

## 2023-10-27 DIAGNOSIS — S40.011A CONTUSION OF RIGHT SHOULDER, INITIAL ENCOUNTER: Primary | ICD-10-CM

## 2023-10-27 PROCEDURE — 99283 EMERGENCY DEPT VISIT LOW MDM: CPT

## 2023-10-27 PROCEDURE — 6370000000 HC RX 637 (ALT 250 FOR IP): Performed by: EMERGENCY MEDICINE

## 2023-10-27 PROCEDURE — 73030 X-RAY EXAM OF SHOULDER: CPT

## 2023-10-27 RX ORDER — HYDROCODONE BITARTRATE AND ACETAMINOPHEN 5; 325 MG/1; MG/1
2 TABLET ORAL ONCE
Status: DISCONTINUED | OUTPATIENT
Start: 2023-10-27 | End: 2023-10-27

## 2023-10-27 RX ORDER — IBUPROFEN 400 MG/1
800 TABLET ORAL ONCE
Status: COMPLETED | OUTPATIENT
Start: 2023-10-27 | End: 2023-10-27

## 2023-10-27 RX ORDER — CYCLOBENZAPRINE HCL 10 MG
10 TABLET ORAL ONCE
Status: COMPLETED | OUTPATIENT
Start: 2023-10-27 | End: 2023-10-27

## 2023-10-27 RX ADMIN — IBUPROFEN 800 MG: 400 TABLET, FILM COATED ORAL at 21:29

## 2023-10-27 RX ADMIN — CYCLOBENZAPRINE 10 MG: 10 TABLET, FILM COATED ORAL at 21:10

## 2023-10-27 ASSESSMENT — PAIN SCALES - GENERAL: PAINLEVEL_OUTOF10: 8

## 2023-10-27 ASSESSMENT — PAIN DESCRIPTION - ORIENTATION: ORIENTATION: RIGHT

## 2023-10-27 ASSESSMENT — PAIN DESCRIPTION - DESCRIPTORS: DESCRIPTORS: ACHING

## 2023-10-27 ASSESSMENT — PAIN DESCRIPTION - LOCATION: LOCATION: SHOULDER

## 2023-10-27 ASSESSMENT — PAIN - FUNCTIONAL ASSESSMENT: PAIN_FUNCTIONAL_ASSESSMENT: ACTIVITIES ARE NOT PREVENTED

## 2023-10-28 NOTE — ACP (ADVANCE CARE PLANNING)
Patient does not have any ACP documents/Medical Power of . LSW notes hospital will follow [place] Next of Kin hierarchy in the following descending order for priority:    Guardian  Spouse  Majority of adult Children  Parents  Majority of adult Siblings  Nearest Relative not described above    Per [place] Next of Kin hierarchy: Patients' adult child will be [address]

## 2023-10-28 NOTE — ED NOTES
Discharge instructions reviewed with patient and all questions addressed. Patient alert and oriented x4 at time of discharge. Patient ambulatory and steady gait.        Felecia Naqvi RN  10/27/23 2170
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
## 2023-11-06 ENCOUNTER — HOSPITAL ENCOUNTER (OUTPATIENT)
Dept: PHYSICAL THERAPY | Age: 43
Setting detail: THERAPIES SERIES
Discharge: HOME | End: 2023-11-06
Payer: COMMERCIAL

## 2023-11-06 PROCEDURE — 97016 VASOPNEUMATIC DEVICE THERAPY: CPT

## 2023-11-06 PROCEDURE — 97110 THERAPEUTIC EXERCISES: CPT

## 2023-11-06 PROCEDURE — 97161 PT EVAL LOW COMPLEX 20 MIN: CPT

## 2023-11-06 NOTE — PROGRESS NOTES
Physical Therapy: Initial Evaluation    Patient: Melina Collet (10 y.o. female)   Examination Date: 6864  Plan of Care Certification Period: 2023 to        :  1980 ;    Confirmed: Y MRN: [id number]  CSN: [id number]   Insurance: Payor: Valery Lynn / Plan: Kalie Rummage / Product Type: *No Product type* /   Insurance ID: [id number] - (Medicaid Managed) Secondary Insurance (if applicable):    Referring Physician: DO Cachorro Menendez DO   PCP: Tesfaye Zavala MD Visits to Date/Visits Approved:   /      No Show/Cancelled Appts:   /       Medical Diagnosis: Superior glenoid labrum lesion of right shoulder, initial encounter [S43.431A] SLAP tear R shoulder  Treatment Diagnosis: RUE weakness, R shoulder pain, impaired R shoulder ROM     PERTINENT MEDICAL HISTORY   Patient Assessed for Rehabilitation Services: Yes       Medical History: Chart Reviewed: Yes   Past Medical History:   Diagnosis Date    Anxiety     Headache(784.0)     Hypertension     Migraine     UTI (urinary tract infection)      Surgical History:   Past Surgical History:   Procedure Laterality Date    BACK SURGERY  08/15/2019    [facility] [address] Ryder Copping: Discectomy/Arthroplasty C4-C6    BICEPS TENDON REPAIR Right 10/3/2023    RIGHT BICEPS TENODESIS performed by Cachorro Mcbride DO at [address] [facility] ARTHROSCOPY Right 10/3/2023    RIGHT SHOULDER ARTHROSCOPY WITH DEBRIDEMENT, SUBACROMIAL DECOMPRESSION, DISTAL CLAVICLE EXCISION, LOOSE BODY REMOVAL performed by Cachorro Mcbride DO at [address] [address]         Medications:   Current Outpatient Medications:     ondansetron (ZOFRAN-ODT) 4 MG disintegrating tablet, Take 1 tablet by mouth 3 times daily as needed for Nausea or Vomiting, Disp: 21 tablet, Rfl: 0    Saccharomyces boulardii (PROBIOTIC) 250 MG CAPS, Take by mouth, Disp: , Rfl:     Multiple Vitamins-Minerals (THERAPEUTIC MULTIVITAMIN-MINERALS) tablet, Take 1 tablet by mouth daily,

## 2023-11-06 NOTE — PLAN OF CARE
Outpatient Physical Therapy           [place]           [x] Phone: [phone]   Fax: [phone]  [place]           [] Phone: [phone]   Fax: [phone]     To:  Alesha Henry DO   From: Jorje Morel, PT, DPT     Patient: Jerline Curling       : 1980  Diagnosis:  Diagnosis: SLAP tear R shoulder  Treatment Diagnosis: RUE weakness, R shoulder pain, impaired R shoulder ROM  Date: 2023    Physical Therapy Certification/Re-Certification Form  Dear Dr. Noemy Monroe,  The following patient has been evaluated for physical therapy services and for therapy to continue, insurance requires physician review of the treatment plan initially and every 90 days. Please review the attached evaluation and/or summary of the patient's plan of care, and verify that you agree therapy should continue by signing the attached document and sending it back to our office. Assessment:    Assessment: Pt is a 46-year-old female who presents to therapy s/p R shoulder biceps tenodesis, distal clavicle excision, SAD, bursectomy, debridement including labrum, lysis of adhesions and loose body removal surgery on 10/3/23. Upon assessment, pt presents to therapy with impaired RUE ROM, RUE weakness, R shoulder and arm pain, and reduced independence with ADLs, IADLs and work tasks. Pt would benefit from skilled therapy interventions to address listed impairments, progress toward goal completion and improve ADL/IADL status. PT also warranted to reduce risk for further injury or decline.     Plan of Care/Treatment to date:  [x] Therapeutic Exercise  [x] Modalities:  [x] Therapeutic Activity     [] Ultrasound  [x] Electrical Stimulation  [] Gait Training      [] Cervical Traction [] Lumbar Traction  [x] Neuromuscular Re-education    [x] Cold/hotpack [] Iontophoresis   [x] Instruction in HEP      [x] Vasopneumatic    [] Dry Needling  [x] Manual Therapy               [] Aquatic Therapy       Other:

## 2023-11-06 NOTE — FLOWSHEET NOTE
ADL/IADL status. PT also warranted to reduce risk for further injury or decline.       Plan for Next Session:   Specific Instructions for Next Treatment: progress per protocol    Time In / Time Out:   9458 - 1108    If [employer] Please Indicate Units for Rx this date and running total for each and overall total for Rx to date:  CPT Code Units today Running Total Units Total approved    TE 03807  1 1    MAN 40839       Gait 98260      NR 55219      TA  48109      Estim Unatt 73179        470[address]      [facility] [phone]      VASO 63043  1 1    Other:    1 1           Total for episode of care   3         Timed Code/Total Treatment Minutes:  28'/36': 8' vaso x1, 8' TE x1, 20' Eval x1      Next Progress Note due:  10th visit      Plan of Care Interventions:  [x] Therapeutic Exercise  [x] Modalities:  [x] Therapeutic Activity     [] Ultrasound  [x] Estim  [] Gait Training      [] Cervical Traction [] Lumbar Traction  [x] Neuromuscular Re-education    [x] Cold/hotpack [] Iontophoresis   [x] Instruction in HEP      [x] Vasopneumatic   [] Dry Needling    [x] Manual Therapy               [] Aquatic Therapy              Electronically signed by:  Stephen Bowie PT, DPT 11/6/2023, 12:43 PM

## 2023-11-13 ENCOUNTER — OFFICE VISIT (OUTPATIENT)
Dept: ORTHOPEDIC SURGERY | Age: 43
End: 2023-11-13

## 2023-11-13 VITALS — BODY MASS INDEX: 41.31 KG/M2 | OXYGEN SATURATION: 97 % | WEIGHT: 210.4 LBS | HEIGHT: 60 IN | HEART RATE: 68 BPM

## 2023-11-13 DIAGNOSIS — Z09 POSTOP CHECK: Primary | ICD-10-CM

## 2023-11-13 PROCEDURE — 99024 POSTOP FOLLOW-UP VISIT: CPT | Performed by: STUDENT IN AN ORGANIZED HEALTH CARE EDUCATION/TRAINING PROGRAM

## 2023-11-13 RX ORDER — OXYCODONE HYDROCHLORIDE AND ACETAMINOPHEN 5; 325 MG/1; MG/1
1 TABLET ORAL EVERY 6 HOURS PRN
Qty: 28 TABLET | Refills: 0 | Status: SHIPPED | OUTPATIENT
Start: 2023-11-13 | End: 2023-11-18
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
## 2023-11-16 ENCOUNTER — HOSPITAL ENCOUNTER (OUTPATIENT)
Dept: PHYSICAL THERAPY | Age: 43
Setting detail: THERAPIES SERIES
Discharge: HOME | End: 2023-11-16
Payer: COMMERCIAL

## 2023-11-16 NOTE — FLOWSHEET NOTE
Physical Therapy  Cancellation/No-show Note  Patient Name:  Fay Rose  :  1980   Date:  2023  Cancelled visits to date: 1  No-shows to date: 1    For today's appointment patient:  [x]  Cancelled  []  Rescheduled appointment  []  No-show     Reason given by patient:  []  Patient ill  []  Conflicting appointment  []  No transportation    []  Conflict with work  []  No reason given  []  Other:     Comments:  had the wrong time    Electronically signed by:  Jenni Varner, PT, DPT

## 2023-11-20 ENCOUNTER — HOSPITAL ENCOUNTER (OUTPATIENT)
Dept: PHYSICAL THERAPY | Age: 43
Setting detail: THERAPIES SERIES
Discharge: HOME | End: 2023-11-20
Payer: COMMERCIAL

## 2023-11-20 PROCEDURE — 97110 THERAPEUTIC EXERCISES: CPT

## 2023-11-20 PROCEDURE — 97140 MANUAL THERAPY 1/> REGIONS: CPT

## 2023-11-20 NOTE — FLOWSHEET NOTE
Outpatient Physical Therapy  [place]           [x] Phone: [phone]   Fax: [phone]  [place]           [] Phone: [phone]   Fax: [phone]        Physical Therapy Daily Treatment Note  Date:  2023    Patient Name:  Joanna Greco    :  1980  MRN: [id number]  Restrictions/Precautions: progress per protocol. No bicep work 6 weeks  Diagnosis:    Diagnosis: SLAP tear R shoulder  Date of Injury/Surgery: 10/3/23  Treatment Diagnosis:  RUE weakness, R shoulder pain, impaired R shoulder ROM  Insurance/Certification information:  [employer] : 80 units/20 visits through 24  Referring Physician:   Ant Carrion DO   Next Doctor Visit:    Plan of care signed (Y/N):  Y  Outcome Measure: Quick Dash: 75%  Visit# / total visits:  2 /10  Pain level:      3/10       Goals:     Patient goals: go back to work  Short term goals  Time Frame for Short term goals: 5 visits  Pt will improve R shoulder flexion AROM to 100 deg or more to aide in ADLs  Pt will improve R shoulder ER AROM to 30 deg or more to aide in ADLs  Long Term Goals  Time Frame for Long Term Goals: 20 visits  Pt will improve R shoulder flexion AROM to 150 deg or more to aide in ADLs  Pt will improve R shoulder abduction AROM to 130 deg or more to aide in progression toward work tasks  Pt will improve R shoulder ER AROM to 50 deg or more to aide in ADLs  Pt will improve RUE strength to 4/5 to aide in work participation  Pt will improve Quick Dash to 50% or less to show [facility] and subjective improvement        Summary of Evaluation:  Assessment: Pt is a 51-year-old female who presents to therapy s/p R shoulder biceps tenodesis, distal clavicle excision, SAD, bursectomy, debridement including labrum, lysis of adhesions and loose body removal surgery on 10/3/23. Upon assessment, pt presents to therapy with impaired RUE ROM, RUE weakness, R shoulder and arm pain, and reduced independence with ADLs, IADLs and work tasks.  Pt would

## 2023-11-27 ENCOUNTER — HOSPITAL ENCOUNTER (OUTPATIENT)
Dept: PHYSICAL THERAPY | Age: 43
Setting detail: THERAPIES SERIES
Discharge: HOME | End: 2023-11-27
Payer: COMMERCIAL

## 2023-11-27 PROCEDURE — 97110 THERAPEUTIC EXERCISES: CPT

## 2023-11-27 NOTE — FLOWSHEET NOTE
Outpatient Physical Therapy  [place]           [x] Phone: [phone]   Fax: [phone]  [facility]           [] Phone: [phone]   Fax: [phone]        Physical Therapy Daily Treatment Note  Date:  2023    Patient Name:  Francoise Joyner    :  1980  MRN: [id number]  Restrictions/Precautions: progress per protocol. No bicep work 6 weeks  Diagnosis:    Diagnosis: SLAP tear R shoulder  Date of Injury/Surgery: 10/3/23  Treatment Diagnosis:  RUE weakness, R shoulder pain, impaired R shoulder ROM  Insurance/Certification information:  [employer] : 80 units/20 visits through 24  Referring Physician:   Christianne Bridges DO   Next Doctor Visit:    Plan of care signed (Y/N):  Y  Outcome Measure: Quick Dash: 75%  Visit# / total visits:  3 /10  Pain level:      0/10    soreness   Goals:     Patient goals: go back to work  Short term goals  Time Frame for Short term goals: 5 visits  Pt will improve R shoulder flexion AROM to 100 deg or more to aide in ADLs  Pt will improve R shoulder ER AROM to 30 deg or more to aide in ADLs  Long Term Goals  Time Frame for Long Term Goals: 20 visits  Pt will improve R shoulder flexion AROM to 150 deg or more to aide in ADLs  Pt will improve R shoulder abduction AROM to 130 deg or more to aide in progression toward work tasks  Pt will improve R shoulder ER AROM to 50 deg or more to aide in ADLs  Pt will improve RUE strength to 4/5 to aide in work participation  Pt will improve Quick Dash to 50% or less to show [facility] and subjective improvement        Summary of Evaluation:  Assessment: Pt is a 79-year-old female who presents to therapy s/p R shoulder biceps tenodesis, distal clavicle excision, SAD, bursectomy, debridement including labrum, lysis of adhesions and loose body removal surgery on 10/3/23. Upon assessment, pt presents to therapy with impaired RUE ROM, RUE weakness, R shoulder and arm pain, and reduced independence with ADLs, IADLs and work tasks.  Pt

## 2023-11-29 ENCOUNTER — TELEPHONE (OUTPATIENT)
Dept: ORTHOPEDIC SURGERY | Age: 43
End: 2023-11-29

## 2023-11-29 NOTE — TELEPHONE ENCOUNTER
Pt would like a letter excusing her from schooling since surgery. 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 Please advise if we are able to provide an excuse.

## 2023-11-30 ENCOUNTER — HOSPITAL ENCOUNTER (OUTPATIENT)
Dept: PHYSICAL THERAPY | Age: 43
Discharge: HOME | End: 2023-11-30

## 2023-11-30 NOTE — FLOWSHEET NOTE
Physical Therapy  Cancellation/No-show Note  Patient Name:  Sandip Barlow  :  1980   Date:  2023  Cancelled visits to date: 2  No-shows to date: 2    For today's appointment patient:  [x]  Cancelled  []  Rescheduled appointment  []  No-show     Reason given by patient:  []  Patient ill  []  Conflicting appointment  []  No transportation    []  Conflict with work  []  No reason given  []  Other:     Comments: darren, is going to ER    Electronically signed by:  Juwan Wilkins, PT, DPT

## 2023-12-05 NOTE — TELEPHONE ENCOUNTER
LMOVM that the letter was approved by Dr Vick Segal and created. A copy could be picked up at the  in the [id number] [address] [place] office.

## 2023-12-07 ENCOUNTER — HOSPITAL ENCOUNTER (OUTPATIENT)
Dept: PHYSICAL THERAPY | Age: 43
Setting detail: THERAPIES SERIES
Discharge: HOME | End: 2023-12-07
Payer: COMMERCIAL

## 2023-12-07 PROCEDURE — 97140 MANUAL THERAPY 1/> REGIONS: CPT

## 2023-12-07 PROCEDURE — 97110 THERAPEUTIC EXERCISES: CPT

## 2023-12-07 NOTE — FLOWSHEET NOTE
benefit from skilled therapy interventions to address listed impairments, progress toward goal completion and improve ADL/IADL status. PT also warranted to reduce risk for further injury or decline. Subjective:   Pt arrives to tx session reporting 2/10 pain in R shoulder. Any changes in Ambulatory Summary Sheet? None      Objective:      PROM flex: ~140 deg    Exercises: (No more than 4 columns)   Exercise/Equipment 11/6/23 #1 11/20/23 #2 11/27/23 #3 12/7/23 #4            WARM UP          pulley  X10 flex X15  x20          TABLE       Table slides Flex x10  Scap x10      ER Table stretch x10      Press up  Cane x10 Cane 2x10 Cane 2x10   Cane ROM  Flex x10  ER x10  Sitting abd x10 Flex 2x10  ER 2x10  Sitting abd 2x10 Flex 2x10  ER 2x10  Sitting abd 2x10    Scap retract  x10 2x10  2x10   ABC   x1 x1          STANDING       Ball rolls   RSB on table x15 R/L, up/down, circles ea way RSB on table x15 R/L, up/down, circles ea way   rows   YTB x10 YTB x10   Wall slides   X10 flex X10 flex                                    PROPRIOCEPTION                                          MODALITIES                         Other Therapeutic Activities/Education:  HEP     Home Exercise Program:  Issued, practiced and pt demo ability to perform 11/6/2023    Manual Treatments:  TPR/MFR shoulder complex, PROM flex, abd, ER     Modalities: none    Communication with other providers:  POC sent    Assessment:  Pt tolerated today's treatment without any adverse reactions or complications this date. Responds well to manual with decreased pain and increased ROM. Pt would continue to benefit from skilled therapy interventions to address remaining impairments, improve mobility and strength and progress toward goal completion while reducing risk for re-injury or further decline.   End pain: 0/10      Plan for Next Session: progress per protocol    Time In / Time Out:  8953 / 1472    If [employer] Please Indicate Units for Rx this date

## 2023-12-14 ENCOUNTER — HOSPITAL ENCOUNTER (OUTPATIENT)
Dept: PHYSICAL THERAPY | Age: 43
Setting detail: THERAPIES SERIES
Discharge: HOME | End: 2023-12-14
Payer: COMMERCIAL
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
## 2023-12-14 NOTE — DISCHARGE SUMMARY
Outpatient Physical Therapy  [place]           [x] Phone: [phone]   Fax: [phone]  [facility]           [] Phone: [phone]   Fax: [phone]        Physical Therapy Daily Discharge Note  Date:  2023    Patient Name:  Meir Guzman    :  1980  MRN: [id number]    Restrictions/Precautions: progress per protocol. No bicep work 6 weeks  Diagnosis:    Diagnosis: SLAP tear R shoulder  Date of Injury/Surgery: 10/3/23  Treatment Diagnosis:  RUE weakness, R shoulder pain, impaired R shoulder ROM  Insurance/Certification information:  [facility]/20 visits through 24  Referring Physician:   Karsten Gomez DO   Next Doctor Visit:    Plan of care signed (Y/N):  Y  Outcome Measure: Quick Dash: 75%  Visit# / total visits:  4 /10  Pain level:      2/10       Goals:     Patient goals: go back to work  Short term goals  Time Frame for Short term goals: 5 visits  Pt will improve R shoulder flexion AROM to 100 deg or more to aide in ADLs  Pt will improve R shoulder ER AROM to 30 deg or more to aide in ADLs  Long Term Goals  Time Frame for Long Term Goals: 20 visits  Pt will improve R shoulder flexion AROM to 150 deg or more to aide in ADLs  Pt will improve R shoulder abduction AROM to 130 deg or more to aide in progression toward work tasks  Pt will improve R shoulder ER AROM to 50 deg or more to aide in ADLs  Pt will improve RUE strength to 4/5 to aide in work participation  Pt will improve Quick Dash to 50% or less to show [facility] and subjective improvement        Summary of Evaluation:  Assessment: Pt is a 27-year-old female who presents to therapy s/p R shoulder biceps tenodesis, distal clavicle excision, SAD, bursectomy, debridement including labrum, lysis of adhesions and loose body removal surgery on 10/3/23. Upon assessment, pt presents to therapy with impaired RUE ROM, RUE weakness, R shoulder and arm pain, and reduced independence with ADLs, IADLs and work tasks.  Pt would

## 2023-12-27 ENCOUNTER — OFFICE VISIT (OUTPATIENT)
Dept: ORTHOPEDIC SURGERY | Age: 43
End: 2023-12-27

## 2023-12-27 VITALS — BODY MASS INDEX: 42.41 KG/M2 | HEIGHT: 60 IN | WEIGHT: 216 LBS | HEART RATE: 81 BPM | OXYGEN SATURATION: 98 %

## 2023-12-27 DIAGNOSIS — Z09 POSTOP CHECK: Primary | ICD-10-CM

## 2023-12-27 PROCEDURE — 99024 POSTOP FOLLOW-UP VISIT: CPT | Performed by: STUDENT IN AN ORGANIZED HEALTH CARE EDUCATION/TRAINING PROGRAM

## 2023-12-27 RX ORDER — ONDANSETRON 4 MG/1
4 TABLET, ORALLY DISINTEGRATING ORAL 3 TIMES DAILY PRN
Qty: 21 TABLET | Refills: 0 | Status: SHIPPED | OUTPATIENT
Start: 2023-12-27

## 2023-12-27 RX ORDER — CYCLOBENZAPRINE HCL 5 MG
5 TABLET ORAL 2 TIMES DAILY PRN
Qty: 30 TABLET | Refills: 0 | Status: SHIPPED | OUTPATIENT
Start: 2023-12-27 | End: 2024-01-06

## 2023-12-27 NOTE — PROGRESS NOTES
Date of surgery: 10-3-2023     Procedure:  Right side  1. Shoulder biceps tenodesis - arthroscopic  2. Shoulder arthroscopic distal clavicle excision  3. Shoulder arthroscopic subacromial decompression, bursectomy   4. Shoulder arthroscopic extensive debridement (anterior, posterior glenohumeral joint, subacromial space) (CPT 31085)  5. Shoulder arthroscopic labral debridement (CPT 59784)  6. Shoulder arthroscopic lysis of adhesions (CPT 79327)  7. Loose body removal      History:  Patient is here in follow up regarding their 12-week postop appointment for above procedure    Patient is doing okay. She states her shoulder is feeling well but she is having some social issues that have been very problematic for her. They have improving 3/10 pain. She states that she stopped going to therapy as she missed several appointments but has been doing exercises on her own and essentially has no issues with range of motion and is gaining strength weekly. No new specific injury to the shoulder. They deny chest pain, SOB, calf pain,fever,wound drainage. No other issues. Patient denies any constitutional symptoms. Patient states they have been compliant with restrictions. Patient is about the sling without issue     Physical:   Patient demonstrates appropriate mood and affect. Right upper extremity exam:   The incisions are well healed and are clean, dry, intact, and nontender with no erythema. They have no edema, the Arm compartments are soft . There are No cords or calf tenderness. No significant calf/ankle edema. They are neurovascularly intact distally. Range of motion of the right shoulder demonstrates forward elevation to 160 degrees actively and 170 passively. She is able to get 170 after gentle manipulation.   External rotation to 60 actively and passively     No areas of tenderness to palpation    Negative Spencer's    Imaging:   No new orthopedic imaging     Impression: Status post above, doing well

## 2023-12-27 NOTE — PROGRESS NOTES
Patient comes in today for a 12 week follow up post right shoulder SLAP repair on 10/3/23. She was last seen in our office on 11/13/23. She rates her pain at 3/10 today. She describes the pain as soreness. She states that she can do most of her ADLs without pain but still has issues with lifting due to weakness. She states that she stopped PT about 2 weeks ago. She also reports that she can not sleep on her right side. She denies any new falls or injuries. She would like more zofran and flexeril.

## 2023-12-27 NOTE — PATIENT INSTRUCTIONS
Medications sent to your pharmacy. May return to work on 1/3/23, no lifting, pushing or pulling more than 25lbs  Follow up in 3 months.

## 2024-01-15 ENCOUNTER — TELEPHONE (OUTPATIENT)
Dept: ORTHOPEDIC SURGERY | Age: 44
End: 2024-01-15

## 2024-01-15 NOTE — TELEPHONE ENCOUNTER
Patient called and stated she is currently working 12 hour days. She would like to have a letter for working stating that she is only to work up 10 hours a day. She is still having pain, and is struggling to get through a 12 hour shift. Please advise. [phone]

## 2024-01-17 DIAGNOSIS — Z09 POSTOP CHECK: ICD-10-CM

## 2024-01-17 RX ORDER — CYCLOBENZAPRINE HCL 10 MG
10 TABLET ORAL NIGHTLY
Qty: 30 TABLET | Refills: 2 | Status: SHIPPED | OUTPATIENT
Start: 2024-01-17

## 2024-01-23 ENCOUNTER — TELEPHONE (OUTPATIENT)
Dept: ORTHOPEDIC SURGERY | Age: 44
End: 2024-01-23

## 2024-01-23 DIAGNOSIS — Z98.890 S/P ARTHROSCOPY OF RIGHT SHOULDER: Primary | ICD-10-CM

## 2024-01-23 NOTE — TELEPHONE ENCOUNTER
Patient called and is requesting pain medication. She stated that since she has started back to work, the she is in a lot of pain.

## 2024-01-24 NOTE — TELEPHONE ENCOUNTER
I talked with patient she reports that she is having pain since back to work. She also states that she has Flexeril and ibuprofen. I discussed a referral to pain management and she would like a referral.

## 2024-03-05 ENCOUNTER — TELEPHONE (OUTPATIENT)
Dept: ORTHOPEDIC SURGERY | Age: 44
End: 2024-03-05

## 2024-03-05 NOTE — TELEPHONE ENCOUNTER
Patient called in with increase in pain in the shoulder. Pt has not seen pain management. Has this been approved with [facility] to see pain management? Patient is taking ibuprofen and flexeril which is not helping. I recommend ice an heat and light stretching. Pt verbally understood

## 2024-03-05 NOTE — TELEPHONE ENCOUNTER
Not sure about the [facility] issue. Can have patient come in for evaluation as needed.

## 2024-03-27 ENCOUNTER — OFFICE VISIT (OUTPATIENT)
Dept: ORTHOPEDIC SURGERY | Age: 44
End: 2024-03-27

## 2024-03-27 VITALS
WEIGHT: 195 LBS | DIASTOLIC BLOOD PRESSURE: 86 MMHG | SYSTOLIC BLOOD PRESSURE: 122 MMHG | HEART RATE: 64 BPM | BODY MASS INDEX: 38.08 KG/M2

## 2024-03-27 DIAGNOSIS — Z98.890 S/P ARTHROSCOPY OF RIGHT SHOULDER: Primary | ICD-10-CM

## 2024-03-27 ASSESSMENT — ENCOUNTER SYMPTOMS
PHOTOPHOBIA: 0
EYE REDNESS: 0
ABDOMINAL PAIN: 0
VOMITING: 0
EYE PAIN: 0
BACK PAIN: 0
STRIDOR: 0
SHORTNESS OF BREATH: 0
FACIAL SWELLING: 0
NAUSEA: 0
COUGH: 0
COLOR CHANGE: 0
WHEEZING: 0

## 2024-03-27 NOTE — PROGRESS NOTES
Patient is here for 6 month post op check on RIGHT SHOULDER ARTHROSCOPY WITH DEBRIDEMENT, SUBACROMIAL DECOMPRESSION, DISTAL CLAVICLE EXCISION, LOOSE BODY REMOVAL. + BICEPS TENODESIS    Has persistent pain in right shoulder since she returned to work. States she was doing okay prior to returning to work on 1/3/24. Pain is persistent 8/10, glenohumeral/AC joint. Some tingling in forearm. Denies new injury to right shoulder.    Established with pain management. Starting Topomax over the next month.    Recall that she WAS working in the time that we had written her off. 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 We were notified of her noncompliance via mail from the [facility]. That letter and the response is saved under media. As such, this note is hidden. She did not talk about the investigation and I did not bring it up.   
DO on 3/27/2024 at 12:23 PM

## 2024-03-29 NOTE — ED TRIAGE NOTES
Reports blurred vision and tingling in bilat feet. Reports hx of ms. Thank you for letting me care for you today - it was nice to meet you and I hope you feel better soon. Please return to the ER if your symptoms don't improve or get worse. And be sure to follow up with your primary care provider within the next week and with [facility] for follow up care. [facility] hours to make an appointment, or you can call [phone] ([phone]) to schedule.     I have prescribed the following medication:  Ciprodex:  4 drops in both ears, 2 times a day for 10 days.  Augmentin: 1 tablet by mouth 2 times a day for 10 days    I have placed a wick in your ears to help get the drops into the canal, the wick may fall out on its own. This is a good sign and signals that the swelling in the canal is getting better. Do not try to remove a wick that does not fall out on its own. If the wick hasn't fallen out in 4-5 days, please make an appointment with your primary care provider or the ear nose and throat doctor for further evaluation.    For pain: Rotate between Tylenol and ibuprofen (Motrin), switching every 3 hours. For example, Tylenol at 8am, ibuprofen at 11am, tylenol at 2pm.  Do not take more than 3000 mg of Tylenol in 1 day or more than 2400 mg of ibuprofen and 1 day.     Our goal at [facility] is to always give you outstanding care and exceptional service. You may receive a survey by mail or email in the next week about your experience in our ED. We would greatly appreciate you completing and returning the survey. Your feedback provides us with a way to recognize our staff who give very good care and it helps us learn how to improve when your experience was below our aspiration of excellence.     All the best,     Thea Carey, MPH, PA-C  Emergency Department Physician Assistant  [facility], [facility]

## 2024-04-24 ENCOUNTER — OFFICE VISIT (OUTPATIENT)
Dept: ORTHOPEDIC SURGERY | Age: 44
End: 2024-04-24

## 2024-04-24 VITALS
WEIGHT: 198.4 LBS | BODY MASS INDEX: 38.95 KG/M2 | HEIGHT: 60 IN | DIASTOLIC BLOOD PRESSURE: 76 MMHG | SYSTOLIC BLOOD PRESSURE: 120 MMHG

## 2024-04-24 DIAGNOSIS — Z98.890 S/P ARTHROSCOPY OF RIGHT SHOULDER: Primary | ICD-10-CM

## 2024-04-24 ASSESSMENT — ENCOUNTER SYMPTOMS
EYE REDNESS: 0
EYE PAIN: 0
COUGH: 0
COLOR CHANGE: 0
BACK PAIN: 0
FACIAL SWELLING: 0
WHEEZING: 0
SHORTNESS OF BREATH: 0
ABDOMINAL PAIN: 0
VOMITING: 0
NAUSEA: 0
STRIDOR: 0
PHOTOPHOBIA: 0

## 2024-04-24 NOTE — PROGRESS NOTES
Pt comes in today for a follow up s/p RT shoulder arthroscopy on 10/3/23. She was last seen in our office on 3/27/24. Pain is 6/10 today. She has an appointment with pain management today. She needs to discuss return to work and needs a work note. No new falls or injuries.   
Normal.      Left wrist: Normal.      Cervical back: Normal range of motion. Spasms present. No swelling, edema, erythema, lacerations, rigidity, tenderness or bony tenderness. Pain with movement present. Normal range of motion.   Skin:     General: Skin is warm and dry.      Capillary Refill: Capillary refill takes less than 2 seconds.   Neurological:      General: No focal deficit present.      Mental Status: She is alert and oriented to person, place, and time.      Sensory: No sensory deficit.      Motor: No weakness.        RIGHT SHOULDER / UPPER EXTREMITY EXAM      OBSERVATION:      Swelling: none   Deformity: none    Discoloration: none   Scapular winging: none    Scars: none    Atrophy: none      TENDERNESS / CREPITUS (T/C):      Clavicle -/-    SUPRAspinatus  -/-     AC Jt. -/-    INFRAspinatus -/-     SC Jt. -/-    Deltoid -/-     G. Tuberosity -/-   LH BICEP groove -/-    Acromion: -/-    Midline Neck -/-    Scapular Spine -/-   Trapezium -/-    Inf Border Scapula -/-   GH jt. line - post -/-     Scapulothoracic -/-   GH jt. line - ant -/-       ROM: (“*” = with pain)  Left shoulder   Right shoulder     AROM (PROM)  AROM (PROM)    FE   170° (175°)    170° (175°)      ER 0°   60° (65°)   60°  (60°)    ER 90° ABD  90°  (90°)   90°  (90°)    IR 90° ABD  40  (40°)    40 (40°)      IR (spine) T10    T10      STRENGTH: (“*” = with pain) Left shoulder   Right shoulder    SCAPTION   5/5    5/5     IR    5/5    5/5    ER    5/5    5/5    Deltoid   5/5    5/5    Biceps   5/5    5/5    Triceps   5/5    5/5          SIGNS:  RUE     Jobes/Empty Can: Neg    NEER: neg    MURILLO: neg     O’EDGARD’S: neg      SPEED’S: neg   Yergason's: neg    Biceps stretch test: neg      EXTREMITY NEURO-VASCULAR EXAM:     Sensation grossly intact to light touch all dermatomal regions.     DTR 2+ Biceps, Triceps, BR and Negative Leandro’s sign    Grossly intact motor function at Elbow, Wrist and Hand    Distal pulses radial and ulnar 2+,

## 2024-05-14 ENCOUNTER — HOSPITAL ENCOUNTER (EMERGENCY)
Age: 44
Discharge: HOME | End: 2024-05-14
Payer: COMMERCIAL

## 2024-05-14 VITALS
DIASTOLIC BLOOD PRESSURE: 57 MMHG | RESPIRATION RATE: 17 BRPM | OXYGEN SATURATION: 100 % | HEART RATE: 87 BPM | TEMPERATURE: 98.1 F | SYSTOLIC BLOOD PRESSURE: 115 MMHG

## 2024-05-14 DIAGNOSIS — S61.551A: Primary | ICD-10-CM
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
## 2024-05-14 DIAGNOSIS — S50.812A ABRASION OF LEFT FOREARM, INITIAL ENCOUNTER: ICD-10-CM

## 2024-05-14 PROCEDURE — 6360000002 HC RX W HCPCS

## 2024-05-14 PROCEDURE — 99284 EMERGENCY DEPT VISIT MOD MDM: CPT

## 2024-05-14 PROCEDURE — 6370000000 HC RX 637 (ALT 250 FOR IP)

## 2024-05-14 PROCEDURE — 90471 IMMUNIZATION ADMIN: CPT

## 2024-05-14 PROCEDURE — 90715 TDAP VACCINE 7 YRS/> IM: CPT

## 2024-05-14 RX ORDER — AMOXICILLIN AND CLAVULANATE POTASSIUM 875; 125 MG/1; MG/1
1 TABLET, FILM COATED ORAL 2 TIMES DAILY
Qty: 14 TABLET | Refills: 0 | Status: SHIPPED | OUTPATIENT
Start: 2024-05-14 | End: 2024-05-21

## 2024-05-14 RX ORDER — GINSENG 100 MG
CAPSULE ORAL ONCE
Status: COMPLETED | OUTPATIENT
Start: 2024-05-14 | End: 2024-05-14

## 2024-05-14 RX ADMIN — TETANUS TOXOID, REDUCED DIPHTHERIA TOXOID AND ACELLULAR PERTUSSIS VACCINE, ADSORBED 0.5 ML: 5; 2.5; 8; 8; 2.5 SUSPENSION INTRAMUSCULAR at 17:21

## 2024-05-14 RX ADMIN — BACITRACIN: 500 OINTMENT TOPICAL at 17:21

## 2024-05-14 ASSESSMENT — PAIN - FUNCTIONAL ASSESSMENT: PAIN_FUNCTIONAL_ASSESSMENT: NONE - DENIES PAIN

## 2024-05-14 ASSESSMENT — PAIN SCALES - GENERAL: PAINLEVEL_OUTOF10: 0
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
## 2024-06-20 ENCOUNTER — APPOINTMENT (OUTPATIENT)
Dept: CT IMAGING | Age: 44
End: 2024-06-20
Payer: COMMERCIAL

## 2024-06-20 ENCOUNTER — APPOINTMENT (OUTPATIENT)
Dept: GENERAL RADIOLOGY | Age: 44
End: 2024-06-20
Payer: COMMERCIAL

## 2024-06-20 ENCOUNTER — HOSPITAL ENCOUNTER (EMERGENCY)
Age: 44
Discharge: HOME | End: 2024-06-20
Payer: COMMERCIAL

## 2024-06-20 VITALS
OXYGEN SATURATION: 99 % | RESPIRATION RATE: 18 BRPM | SYSTOLIC BLOOD PRESSURE: 132 MMHG | TEMPERATURE: 98.2 F | HEART RATE: 84 BPM | DIASTOLIC BLOOD PRESSURE: 98 MMHG | BODY MASS INDEX: 36.52 KG/M2 | WEIGHT: 187 LBS

## 2024-06-20 DIAGNOSIS — J02.9 SORE THROAT: ICD-10-CM

## 2024-06-20 DIAGNOSIS — J18.9 PNEUMONIA DUE TO INFECTIOUS ORGANISM, UNSPECIFIED LATERALITY, UNSPECIFIED PART OF LUNG: Primary | ICD-10-CM

## 2024-06-20 LAB
ALBUMIN SERPL-MCNC: 4.3 GM/DL (ref 3.4–5)
ALP BLD-CCNC: 126 IU/L (ref 40–129)
ALT SERPL-CCNC: 20 U/L (ref 10–40)
ANION GAP SERPL CALCULATED.3IONS-SCNC: 10 MMOL/L (ref 7–16)
AST SERPL-CCNC: 21 IU/L (ref 15–37)
BASOPHILS ABSOLUTE: 0.1 K/CU MM
BASOPHILS RELATIVE PERCENT: 0.7 % (ref 0–1)
BILIRUB SERPL-MCNC: 0.2 MG/DL (ref 0–1)
BUN SERPL-MCNC: 15 MG/DL (ref 6–23)
CALCIUM SERPL-MCNC: 9.4 MG/DL (ref 8.3–10.6)
CHLORIDE BLD-SCNC: 105 MMOL/L (ref 99–110)
CO2: 23 MMOL/L (ref 21–32)
CREAT SERPL-MCNC: 0.8 MG/DL (ref 0.6–1.1)
D-DIMER QUANTITATIVE: 0.36 UG/ML (FEU)
DIFFERENTIAL TYPE: ABNORMAL
EOSINOPHILS ABSOLUTE: 0.3 K/CU MM
EOSINOPHILS RELATIVE PERCENT: 3.3 % (ref 0–3)
GFR, ESTIMATED: >90 ML/MIN/1.73M2
GLUCOSE SERPL-MCNC: 71 MG/DL (ref 70–99)
HCT VFR BLD CALC: 40.7 % (ref 37–47)
HEMOGLOBIN: 13.2 GM/DL (ref 12.5–16)
IMMATURE NEUTROPHIL %: 0.2 % (ref 0–0.43)
LYMPHOCYTES ABSOLUTE: 3.3 K/CU MM
LYMPHOCYTES RELATIVE PERCENT: 38.2 % (ref 24–44)
MCH RBC QN AUTO: 28.2 PG (ref 27–31)
MCHC RBC AUTO-ENTMCNC: 32.4 % (ref 32–36)
MCV RBC AUTO: 87 FL (ref 78–100)
MONOCYTES ABSOLUTE: 0.4 K/CU MM
MONOCYTES RELATIVE PERCENT: 4.7 % (ref 0–4)
NEUTROPHILS ABSOLUTE: 4.5 K/CU MM
NEUTROPHILS RELATIVE PERCENT: 52.9 % (ref 36–66)
NUCLEATED RBC %: 0 %
PDW BLD-RTO: 13.4 % (ref 11.7–14.9)
PLATELET # BLD: 302 K/CU MM (ref 140–440)
PMV BLD AUTO: 10 FL (ref 7.5–11.1)
POTASSIUM SERPL-SCNC: 4.7 MMOL/L (ref 3.5–5.1)
PREGNANCY, SERUM: NEGATIVE
RBC # BLD: 4.68 M/CU MM (ref 4.2–5.4)
SODIUM BLD-SCNC: 138 MMOL/L (ref 135–145)
TOTAL IMMATURE NEUTOROPHIL: 0.02 K/CU MM
TOTAL NUCLEATED RBC: 0 K/CU MM
TOTAL PROTEIN: 8.2 GM/DL (ref 6.4–8.2)
TROPONIN, HIGH SENSITIVITY: <6 NG/L (ref 0–14)
WBC # BLD: 8.5 K/CU MM (ref 4–10.5)

## 2024-06-20 PROCEDURE — 85379 FIBRIN DEGRADATION QUANT: CPT

## 2024-06-20 PROCEDURE — 85025 COMPLETE CBC W/AUTO DIFF WBC: CPT

## 2024-06-20 PROCEDURE — 96374 THER/PROPH/DIAG INJ IV PUSH: CPT

## 2024-06-20 PROCEDURE — 70490 CT SOFT TISSUE NECK W/O DYE: CPT

## 2024-06-20 PROCEDURE — 84484 ASSAY OF TROPONIN QUANT: CPT

## 2024-06-20 PROCEDURE — 84703 CHORIONIC GONADOTROPIN ASSAY: CPT

## 2024-06-20 PROCEDURE — 71045 X-RAY EXAM CHEST 1 VIEW: CPT

## 2024-06-20 PROCEDURE — 6370000000 HC RX 637 (ALT 250 FOR IP): Performed by: PHYSICIAN ASSISTANT

## 2024-06-20 PROCEDURE — 80053 COMPREHEN METABOLIC PANEL: CPT

## 2024-06-20 PROCEDURE — 99285 EMERGENCY DEPT VISIT HI MDM: CPT

## 2024-06-20 PROCEDURE — 6360000002 HC RX W HCPCS: Performed by: PHYSICIAN ASSISTANT

## 2024-06-20 PROCEDURE — 2580000003 HC RX 258: Performed by: PHYSICIAN ASSISTANT

## 2024-06-20 PROCEDURE — 93005 ELECTROCARDIOGRAM TRACING: CPT | Performed by: PHYSICIAN ASSISTANT

## 2024-06-20 PROCEDURE — 71250 CT THORAX DX C-: CPT

## 2024-06-20 RX ORDER — DOXYCYCLINE HYCLATE 100 MG
100 TABLET ORAL 2 TIMES DAILY
Qty: 20 TABLET | Refills: 0 | Status: SHIPPED | OUTPATIENT
Start: 2024-06-20 | End: 2024-06-30

## 2024-06-20 RX ORDER — DEXAMETHASONE SODIUM PHOSPHATE 10 MG/ML
10 INJECTION, SOLUTION INTRAMUSCULAR; INTRAVENOUS ONCE
Status: COMPLETED | OUTPATIENT
Start: 2024-06-20 | End: 2024-06-20

## 2024-06-20 RX ORDER — SODIUM CHLORIDE 0.9 % (FLUSH) 0.9 %
5-40 SYRINGE (ML) INJECTION 2 TIMES DAILY
Status: DISCONTINUED | OUTPATIENT
Start: 2024-06-20 | End: 2024-06-21 | Stop reason: HOSPADM

## 2024-06-20 RX ORDER — 0.9 % SODIUM CHLORIDE 0.9 %
1000 INTRAVENOUS SOLUTION INTRAVENOUS ONCE
Status: COMPLETED | OUTPATIENT
Start: 2024-06-20 | End: 2024-06-20

## 2024-06-20 RX ORDER — ALBUTEROL SULFATE 90 UG/1
2 AEROSOL, METERED RESPIRATORY (INHALATION) EVERY 6 HOURS PRN
Qty: 18 G | Refills: 0 | Status: SHIPPED | OUTPATIENT
Start: 2024-06-20

## 2024-06-20 RX ORDER — DOXYCYCLINE HYCLATE 100 MG
100 TABLET ORAL ONCE
Status: COMPLETED | OUTPATIENT
Start: 2024-06-20 | End: 2024-06-20

## 2024-06-20 RX ADMIN — SODIUM CHLORIDE 1000 ML: 9 INJECTION, SOLUTION INTRAVENOUS at 20:04

## 2024-06-20 RX ADMIN — DOXYCYCLINE HYCLATE 100 MG: 100 TABLET, COATED ORAL at 23:41

## 2024-06-20 RX ADMIN — DEXAMETHASONE SODIUM PHOSPHATE 10 MG: 10 INJECTION, SOLUTION INTRAMUSCULAR; INTRAVENOUS at 20:04

## 2024-06-20 ASSESSMENT — PAIN DESCRIPTION - DESCRIPTORS: DESCRIPTORS: ACHING

## 2024-06-20 ASSESSMENT — PAIN - FUNCTIONAL ASSESSMENT
PAIN_FUNCTIONAL_ASSESSMENT: ACTIVITIES ARE NOT PREVENTED
PAIN_FUNCTIONAL_ASSESSMENT: 0-10
PAIN_FUNCTIONAL_ASSESSMENT: 0-10

## 2024-06-20 ASSESSMENT — PAIN DESCRIPTION - LOCATION
LOCATION: HEAD
LOCATION: THROAT

## 2024-06-20 ASSESSMENT — PAIN DESCRIPTION - ORIENTATION: ORIENTATION: RIGHT

## 2024-06-20 ASSESSMENT — PAIN SCALES - GENERAL
PAINLEVEL_OUTOF10: 7
PAINLEVEL_OUTOF10: 8

## 2024-06-20 NOTE — ED PROVIDER NOTES
EMERGENCY DEPARTMENT ENCOUNTER        Pt Name: Abmibola Morales  MRN: [id number]  Birthdate 1980  Date of evaluation: 6/20/2024  Provider: Shirley Jacques PA-C  PCP: Milagros Santacruz MD    VICK. I have evaluated this patient.        Triage CHIEF COMPLAINT       Chief Complaint   Patient presents with    Generalized Body Aches         HISTORY OF PRESENT ILLNESS      Chief Complaint: Sore throat, chest pain    Abimbola Morales is a 43 y.o. female who presents with a sore throat and chest pain.  She states the sore throat has been going on for several days.  She had a negative strep swab.  She states pain is worse with swallowing, feels swollen.  No difficulties handling her oral secretions.  No fever or chills.  No nasal congestion or cough.  Chest pain began yesterday.  Tightness.  Worse when she was walking down the girard at work today.  She states the nurse took her BP and advised her to come to the ED.  She denies any n/v/d.  She does have generalized body aches.  She states her PCP did take her off her BP medication recently because she had lost weight.        Nursing Notes were all reviewed and agreed with or any disagreements were addressed in the HPI.    REVIEW OF SYSTEMS     CONSTITUTIONAL:  Denies fever.  EYES:  Denies visual changes.  HEAD:  Denies headache.  ENT:  Denies earache, nasal congestion, + sore throat.  NECK:  Denies neck pain.  RESPIRATORY:  Denies any shortness of breath.  CARDIOVASCULAR:  + chest pain.  GI:  Denies nausea or vomiting.    :  Denies urinary symptoms.  MUSCULOSKELETAL:  Denies extremity pain or swelling.  BACK:  Denies back pain.  INTEGUMENT:  Denies skin changes.  LYMPHATIC:  Denies lymphadenopathy.  NEUROLOGIC:  Denies any numbness/tingling.  PSYCHIATRIC:  Denies SI/HI.    PAST MEDICAL HISTORY     Past Medical History:   Diagnosis Date    Anxiety     Headache(784.0)     Hypertension     Migraine     UTI (urinary tract infection)        SURGICAL HISTORY     Past

## 2024-06-21 LAB
EKG ATRIAL RATE: 96 BPM
EKG DIAGNOSIS: NORMAL
EKG P AXIS: 42 DEGREES
EKG P-R INTERVAL: 140 MS
EKG Q-T INTERVAL: 338 MS
EKG QRS DURATION: 78 MS
EKG QTC CALCULATION (BAZETT): 427 MS
EKG R AXIS: 19 DEGREES
EKG T AXIS: 33 DEGREES
EKG VENTRICULAR RATE: 96 BPM

## 2024-06-21 PROCEDURE — 93010 ELECTROCARDIOGRAM REPORT: CPT | Performed by: INTERNAL MEDICINE

## 2024-09-02 ENCOUNTER — HOSPITAL ENCOUNTER (OUTPATIENT)
Age: 44
Setting detail: OBSERVATION
Discharge: HOME | End: 2024-09-03
Attending: STUDENT IN AN ORGANIZED HEALTH CARE EDUCATION/TRAINING PROGRAM | Admitting: STUDENT IN AN ORGANIZED HEALTH CARE EDUCATION/TRAINING PROGRAM
Payer: COMMERCIAL

## 2024-09-02 ENCOUNTER — APPOINTMENT (OUTPATIENT)
Dept: GENERAL RADIOLOGY | Age: 44
End: 2024-09-02
Payer: COMMERCIAL

## 2024-09-02 DIAGNOSIS — R79.89 ELEVATED D-DIMER: Primary | ICD-10-CM

## 2024-09-02 DIAGNOSIS — I82.4Y3 DEEP VEIN THROMBOSIS (DVT) OF PROXIMAL VEIN OF BOTH LOWER EXTREMITIES, UNSPECIFIED CHRONICITY (HCC): ICD-10-CM

## 2024-09-02 DIAGNOSIS — R07.9 CHEST PAIN, UNSPECIFIED TYPE: ICD-10-CM

## 2024-09-02 LAB
ALBUMIN SERPL-MCNC: 4.3 GM/DL (ref 3.4–5)
ALP BLD-CCNC: 144 IU/L (ref 40–129)
ALT SERPL-CCNC: 24 U/L (ref 10–40)
ANION GAP SERPL CALCULATED.3IONS-SCNC: 10 MMOL/L (ref 7–16)
AST SERPL-CCNC: 22 IU/L (ref 15–37)
BASOPHILS ABSOLUTE: 0.1 K/CU MM
BASOPHILS RELATIVE PERCENT: 0.7 % (ref 0–1)
BILIRUB SERPL-MCNC: 0.2 MG/DL (ref 0–1)
BUN SERPL-MCNC: 16 MG/DL (ref 6–23)
CALCIUM SERPL-MCNC: 9 MG/DL (ref 8.3–10.6)
CHLORIDE BLD-SCNC: 104 MMOL/L (ref 99–110)
CO2: 25 MMOL/L (ref 21–32)
CREAT SERPL-MCNC: 0.8 MG/DL (ref 0.6–1.1)
DIFFERENTIAL TYPE: ABNORMAL
EOSINOPHILS ABSOLUTE: 0.2 K/CU MM
EOSINOPHILS RELATIVE PERCENT: 2.7 % (ref 0–3)
GFR, ESTIMATED: >90 ML/MIN/1.73M2
GLUCOSE SERPL-MCNC: 84 MG/DL (ref 70–99)
HCT VFR BLD CALC: 39.2 % (ref 37–47)
HEMOGLOBIN: 13.1 GM/DL (ref 12.5–16)
IMMATURE NEUTROPHIL %: 0.2 % (ref 0–0.43)
LYMPHOCYTES ABSOLUTE: 4 K/CU MM
LYMPHOCYTES RELATIVE PERCENT: 45 % (ref 24–44)
MCH RBC QN AUTO: 29.2 PG (ref 27–31)
MCHC RBC AUTO-ENTMCNC: 33.4 % (ref 32–36)
MCV RBC AUTO: 87.3 FL (ref 78–100)
MONOCYTES ABSOLUTE: 0.6 K/CU MM
MONOCYTES RELATIVE PERCENT: 6.9 % (ref 0–4)
NEUTROPHILS ABSOLUTE: 3.9 K/CU MM
NEUTROPHILS RELATIVE PERCENT: 44.5 % (ref 36–66)
NUCLEATED RBC %: 0 %
PDW BLD-RTO: 13.5 % (ref 11.7–14.9)
PLATELET # BLD: 278 K/CU MM (ref 140–440)
PMV BLD AUTO: 10.2 FL (ref 7.5–11.1)
POTASSIUM SERPL-SCNC: 3.6 MMOL/L (ref 3.5–5.1)
RBC # BLD: 4.49 M/CU MM (ref 4.2–5.4)
SODIUM BLD-SCNC: 139 MMOL/L (ref 135–145)
TOTAL IMMATURE NEUTOROPHIL: 0.02 K/CU MM
TOTAL NUCLEATED RBC: 0 K/CU MM
TOTAL PROTEIN: 8.2 GM/DL (ref 6.4–8.2)
TROPONIN, HIGH SENSITIVITY: <6 NG/L (ref 0–14)
WBC # BLD: 8.8 K/CU MM (ref 4–10.5)

## 2024-09-02 PROCEDURE — 71046 X-RAY EXAM CHEST 2 VIEWS: CPT

## 2024-09-02 PROCEDURE — 93005 ELECTROCARDIOGRAM TRACING: CPT | Performed by: NURSE PRACTITIONER

## 2024-09-02 PROCEDURE — 85025 COMPLETE CBC W/AUTO DIFF WBC: CPT

## 2024-09-02 PROCEDURE — 84484 ASSAY OF TROPONIN QUANT: CPT

## 2024-09-02 PROCEDURE — 99285 EMERGENCY DEPT VISIT HI MDM: CPT

## 2024-09-02 PROCEDURE — 6370000000 HC RX 637 (ALT 250 FOR IP): Performed by: NURSE PRACTITIONER

## 2024-09-02 PROCEDURE — 80053 COMPREHEN METABOLIC PANEL: CPT

## 2024-09-02 RX ORDER — ASPIRIN 81 MG/1
324 TABLET, CHEWABLE ORAL ONCE
Status: COMPLETED | OUTPATIENT
Start: 2024-09-02 | End: 2024-09-02

## 2024-09-02 RX ORDER — NITROGLYCERIN 0.4 MG/1
0.4 TABLET SUBLINGUAL EVERY 5 MIN PRN
Status: DISCONTINUED | OUTPATIENT
Start: 2024-09-02 | End: 2024-09-03 | Stop reason: HOSPADM

## 2024-09-02 RX ORDER — MORPHINE SULFATE 4 MG/ML
4 INJECTION, SOLUTION INTRAMUSCULAR; INTRAVENOUS
Status: DISCONTINUED | OUTPATIENT
Start: 2024-09-02 | End: 2024-09-02

## 2024-09-02 RX ADMIN — NITROGLYCERIN 0.4 MG: 0.4 TABLET SUBLINGUAL at 22:33

## 2024-09-02 RX ADMIN — ASPIRIN 324 MG: 81 TABLET, CHEWABLE ORAL at 22:47

## 2024-09-02 RX ADMIN — NITROGLYCERIN 0.4 MG: 0.4 TABLET SUBLINGUAL at 22:50

## 2024-09-02 ASSESSMENT — PAIN SCALES - GENERAL
PAINLEVEL_OUTOF10: 9
PAINLEVEL_OUTOF10: 6
PAINLEVEL_OUTOF10: 9
PAINLEVEL_OUTOF10: 9

## 2024-09-02 ASSESSMENT — PAIN DESCRIPTION - ORIENTATION: ORIENTATION: LEFT

## 2024-09-02 ASSESSMENT — PAIN DESCRIPTION - PAIN TYPE: TYPE: ACUTE PAIN

## 2024-09-02 ASSESSMENT — HEART SCORE: ECG: NORMAL

## 2024-09-02 ASSESSMENT — PAIN DESCRIPTION - LOCATION
LOCATION: CHEST
LOCATION: CHEST

## 2024-09-02 ASSESSMENT — PAIN DESCRIPTION - DESCRIPTORS
DESCRIPTORS: ACHING
DESCRIPTORS: ACHING

## 2024-09-02 ASSESSMENT — PAIN - FUNCTIONAL ASSESSMENT: PAIN_FUNCTIONAL_ASSESSMENT: 0-10

## 2024-09-02 ASSESSMENT — LIFESTYLE VARIABLES
HOW MANY STANDARD DRINKS CONTAINING ALCOHOL DO YOU HAVE ON A TYPICAL DAY: PATIENT DOES NOT DRINK
HOW OFTEN DO YOU HAVE A DRINK CONTAINING ALCOHOL: NEVER

## 2024-09-03 ENCOUNTER — APPOINTMENT (OUTPATIENT)
Dept: ULTRASOUND IMAGING | Age: 44
End: 2024-09-03
Payer: COMMERCIAL

## 2024-09-03 ENCOUNTER — HOSPITAL ENCOUNTER (OUTPATIENT)
Dept: NON INVASIVE DIAGNOSTICS | Age: 44
Setting detail: OBSERVATION
Discharge: HOME | End: 2024-09-05
Payer: COMMERCIAL

## 2024-09-03 ENCOUNTER — APPOINTMENT (OUTPATIENT)
Dept: NUCLEAR MEDICINE | Age: 44
End: 2024-09-03
Payer: COMMERCIAL

## 2024-09-03 ENCOUNTER — APPOINTMENT (OUTPATIENT)
Dept: CT IMAGING | Age: 44
End: 2024-09-03
Payer: COMMERCIAL

## 2024-09-03 ENCOUNTER — APPOINTMENT (OUTPATIENT)
Dept: NON INVASIVE DIAGNOSTICS | Age: 44
End: 2024-09-03
Attending: INTERNAL MEDICINE
Payer: COMMERCIAL

## 2024-09-03 VITALS
WEIGHT: 201 LBS | OXYGEN SATURATION: 100 % | HEIGHT: 60 IN | DIASTOLIC BLOOD PRESSURE: 93 MMHG | TEMPERATURE: 97.9 F | HEART RATE: 82 BPM | BODY MASS INDEX: 39.46 KG/M2 | RESPIRATION RATE: 16 BRPM | SYSTOLIC BLOOD PRESSURE: 141 MMHG

## 2024-09-03 PROBLEM — R07.9 CHEST PAIN AT REST: Status: ACTIVE | Noted: 2024-09-03

## 2024-09-03 PROBLEM — R79.89 ELEVATED D-DIMER: Status: ACTIVE | Noted: 2024-09-03

## 2024-09-03 LAB
AMPHETAMINES: NEGATIVE
BARBITURATE SCREEN URINE: NEGATIVE
BENZODIAZEPINE SCREEN, URINE: NEGATIVE
CANNABINOID SCREEN URINE: NEGATIVE
CHOLEST SERPL-MCNC: 184 MG/DL
COCAINE METABOLITE: NEGATIVE
D-DIMER QUANTITATIVE: 0.67 UG/ML (FEU)
ECHO AO ROOT DIAM: 2.8 CM
ECHO AO ROOT INDEX: 1.5 CM/M2
ECHO AV AREA PEAK VELOCITY: 2.3 CM2
ECHO AV AREA VTI: 2.3 CM2
ECHO AV AREA/BSA PEAK VELOCITY: 1.2 CM2/M2
ECHO AV AREA/BSA VTI: 1.2 CM2/M2
ECHO AV MEAN GRADIENT: 4 MMHG
ECHO AV MEAN VELOCITY: 1 M/S
ECHO AV PEAK GRADIENT: 6 MMHG
ECHO AV PEAK VELOCITY: 1.2 M/S
ECHO AV VELOCITY RATIO: 0.83
ECHO AV VTI: 27 CM
ECHO BSA: 1.96 M2
ECHO BSA: 1.97 M2
ECHO IVC PROX: 0.8 CM
ECHO LA AREA 4C: 11.9 CM2
ECHO LA DIAMETER INDEX: 1.66 CM/M2
ECHO LA DIAMETER: 3.1 CM
ECHO LA MAJOR AXIS: 5 CM
ECHO LA TO AORTIC ROOT RATIO: 1.11
ECHO LA VOL MOD A4C: 25 ML (ref 22–52)
ECHO LA VOLUME INDEX MOD A4C: 13 ML/M2 (ref 16–34)
ECHO LV E' LATERAL VELOCITY: 17 CM/S
ECHO LV E' SEPTAL VELOCITY: 12 CM/S
ECHO LV EDV A4C: 69 ML
ECHO LV EDV INDEX A4C: 37 ML/M2
ECHO LV EF PHYSICIAN: 55 %
ECHO LV EJECTION FRACTION A4C: 64 %
ECHO LV ESV A4C: 25 ML
ECHO LV ESV INDEX A4C: 13 ML/M2
ECHO LV FRACTIONAL SHORTENING: 27 % (ref 28–44)
ECHO LV INTERNAL DIMENSION DIASTOLE INDEX: 2.19 CM/M2
ECHO LV INTERNAL DIMENSION DIASTOLIC: 4.1 CM (ref 3.9–5.3)
ECHO LV INTERNAL DIMENSION SYSTOLIC INDEX: 1.6 CM/M2
ECHO LV INTERNAL DIMENSION SYSTOLIC: 3 CM
ECHO LV IVSD: 0.8 CM (ref 0.6–0.9)
ECHO LV MASS 2D: 114.1 G (ref 67–162)
ECHO LV MASS INDEX 2D: 61 G/M2 (ref 43–95)
ECHO LV POSTERIOR WALL DIASTOLIC: 1 CM (ref 0.6–0.9)
ECHO LV RELATIVE WALL THICKNESS RATIO: 0.49
ECHO LVOT AREA: 2.8 CM2
ECHO LVOT AV VTI INDEX: 0.79
ECHO LVOT DIAM: 1.9 CM
ECHO LVOT MEAN GRADIENT: 2 MMHG
ECHO LVOT PEAK GRADIENT: 4 MMHG
ECHO LVOT PEAK VELOCITY: 1 M/S
ECHO LVOT STROKE VOLUME INDEX: 32.4 ML/M2
ECHO LVOT SV: 60.6 ML
ECHO LVOT VTI: 21.4 CM
ECHO MV A VELOCITY: 0.47 M/S
ECHO MV E VELOCITY: 0.85 M/S
ECHO MV E/A RATIO: 1.81
ECHO MV E/E' LATERAL: 5
ECHO MV E/E' RATIO (AVERAGED): 6.04
ECHO MV E/E' SEPTAL: 7.08
ECHO RV MID DIMENSION: 3.3 CM
ESTIMATED AVERAGE GLUCOSE: 91 MG/DL
FENTANYL URINE: NEGATIVE
HBA1C MFR BLD: 4.8 % (ref 4.2–6.3)
HCYS SERPL-SCNC: 10.8 UMOL/L (ref 0–10)
HDLC SERPL-MCNC: 50 MG/DL
INTERPRETATION: NORMAL
LDLC SERPL CALC-MCNC: 120 MG/DL
NUC STRESS EJECTION FRACTION: 71 %
OPIATES, URINE: NEGATIVE
OXYCODONE: NEGATIVE
PREGNANCY, URINE: NEGATIVE
STRESS BASELINE DIAS BP: 94 MMHG
STRESS BASELINE HR: 71 BPM
STRESS BASELINE SYS BP: 131 MMHG
STRESS ESTIMATED WORKLOAD: 10 METS
STRESS PEAK DIAS BP: 105 MMHG
STRESS PEAK SYS BP: 187 MMHG
STRESS PERCENT HR ACHIEVED: 85 %
STRESS POST PEAK HR: 153 BPM
STRESS RATE PRESSURE PRODUCT: NORMAL BPM*MMHG
STRESS TARGET HR: 179 BPM
TRIGL SERPL-MCNC: 68 MG/DL
TROPONIN, HIGH SENSITIVITY: <6 NG/L (ref 0–14)
TROPONIN, HIGH SENSITIVITY: <6 NG/L (ref 0–14)
TSH SERPL DL<=0.005 MIU/L-ACNC: 1.54 UIU/ML (ref 0.27–4.2)

## 2024-09-03 PROCEDURE — 6370000000 HC RX 637 (ALT 250 FOR IP): Performed by: STUDENT IN AN ORGANIZED HEALTH CARE EDUCATION/TRAINING PROGRAM

## 2024-09-03 PROCEDURE — G0378 HOSPITAL OBSERVATION PER HR: HCPCS

## 2024-09-03 PROCEDURE — 36415 COLL VENOUS BLD VENIPUNCTURE: CPT

## 2024-09-03 PROCEDURE — 93970 EXTREMITY STUDY: CPT

## 2024-09-03 PROCEDURE — 93306 TTE W/DOPPLER COMPLETE: CPT | Performed by: INTERNAL MEDICINE

## 2024-09-03 PROCEDURE — 85379 FIBRIN DEGRADATION QUANT: CPT

## 2024-09-03 PROCEDURE — 6360000004 HC RX CONTRAST MEDICATION: Performed by: PHYSICIAN ASSISTANT

## 2024-09-03 PROCEDURE — 80061 LIPID PANEL: CPT

## 2024-09-03 PROCEDURE — 6370000000 HC RX 637 (ALT 250 FOR IP): Performed by: PHYSICIAN ASSISTANT

## 2024-09-03 PROCEDURE — 80307 DRUG TEST PRSMV CHEM ANLYZR: CPT

## 2024-09-03 PROCEDURE — 71275 CT ANGIOGRAPHY CHEST: CPT

## 2024-09-03 PROCEDURE — 93016 CV STRESS TEST SUPVJ ONLY: CPT | Performed by: INTERNAL MEDICINE

## 2024-09-03 PROCEDURE — 6370000000 HC RX 637 (ALT 250 FOR IP)

## 2024-09-03 PROCEDURE — 78452 HT MUSCLE IMAGE SPECT MULT: CPT | Performed by: INTERNAL MEDICINE

## 2024-09-03 PROCEDURE — 3430000000 HC RX DIAGNOSTIC RADIOPHARMACEUTICAL: Performed by: INTERNAL MEDICINE

## 2024-09-03 PROCEDURE — 93005 ELECTROCARDIOGRAM TRACING: CPT | Performed by: PHYSICIAN ASSISTANT

## 2024-09-03 PROCEDURE — 83090 ASSAY OF HOMOCYSTEINE: CPT

## 2024-09-03 PROCEDURE — A9500 TC99M SESTAMIBI: HCPCS | Performed by: INTERNAL MEDICINE

## 2024-09-03 PROCEDURE — 81241 F5 GENE: CPT

## 2024-09-03 PROCEDURE — 86147 CARDIOLIPIN ANTIBODY EA IG: CPT

## 2024-09-03 PROCEDURE — 81240 F2 GENE: CPT

## 2024-09-03 PROCEDURE — 81025 URINE PREGNANCY TEST: CPT

## 2024-09-03 PROCEDURE — APPNB15 APP NON BILLABLE TIME 0-15 MINS

## 2024-09-03 PROCEDURE — 99255 IP/OBS CONSLTJ NEW/EST HI 80: CPT | Performed by: INTERNAL MEDICINE

## 2024-09-03 PROCEDURE — 85305 CLOT INHIBIT PROT S TOTAL: CPT

## 2024-09-03 PROCEDURE — 93306 TTE W/DOPPLER COMPLETE: CPT

## 2024-09-03 PROCEDURE — 85303 CLOT INHIBIT PROT C ACTIVITY: CPT

## 2024-09-03 PROCEDURE — 84443 ASSAY THYROID STIM HORMONE: CPT

## 2024-09-03 PROCEDURE — 93018 CV STRESS TEST I&R ONLY: CPT | Performed by: INTERNAL MEDICINE

## 2024-09-03 PROCEDURE — 94761 N-INVAS EAR/PLS OXIMETRY MLT: CPT

## 2024-09-03 PROCEDURE — 85300 ANTITHROMBIN III ACTIVITY: CPT

## 2024-09-03 PROCEDURE — 78452 HT MUSCLE IMAGE SPECT MULT: CPT

## 2024-09-03 PROCEDURE — 93017 CV STRESS TEST TRACING ONLY: CPT

## 2024-09-03 PROCEDURE — 83036 HEMOGLOBIN GLYCOSYLATED A1C: CPT

## 2024-09-03 RX ORDER — ONDANSETRON 4 MG/1
4 TABLET, ORALLY DISINTEGRATING ORAL EVERY 8 HOURS PRN
Status: DISCONTINUED | OUTPATIENT
Start: 2024-09-03 | End: 2024-09-03 | Stop reason: HOSPADM

## 2024-09-03 RX ORDER — TETRAKIS(2-METHOXYISOBUTYLISOCYANIDE)COPPER(I) TETRAFLUOROBORATE 1 MG/ML
11 INJECTION, POWDER, LYOPHILIZED, FOR SOLUTION INTRAVENOUS
Status: COMPLETED | OUTPATIENT
Start: 2024-09-03 | End: 2024-09-03

## 2024-09-03 RX ORDER — ASPIRIN 81 MG/1
81 TABLET, CHEWABLE ORAL DAILY
Status: DISCONTINUED | OUTPATIENT
Start: 2024-09-03 | End: 2024-09-03 | Stop reason: HOSPADM

## 2024-09-03 RX ORDER — ACETAMINOPHEN 650 MG/1
650 SUPPOSITORY RECTAL EVERY 6 HOURS PRN
Status: DISCONTINUED | OUTPATIENT
Start: 2024-09-03 | End: 2024-09-03 | Stop reason: HOSPADM

## 2024-09-03 RX ORDER — HYDROCHLOROTHIAZIDE 25 MG/1
25 TABLET ORAL DAILY
Status: DISCONTINUED | OUTPATIENT
Start: 2024-09-03 | End: 2024-09-03 | Stop reason: HOSPADM

## 2024-09-03 RX ORDER — ACETAMINOPHEN 325 MG/1
650 TABLET ORAL EVERY 6 HOURS PRN
Status: DISCONTINUED | OUTPATIENT
Start: 2024-09-03 | End: 2024-09-03 | Stop reason: HOSPADM

## 2024-09-03 RX ORDER — IOPAMIDOL 755 MG/ML
80 INJECTION, SOLUTION INTRAVASCULAR
Status: COMPLETED | OUTPATIENT
Start: 2024-09-03 | End: 2024-09-03

## 2024-09-03 RX ORDER — HYDROXYZINE PAMOATE 25 MG/1
25 CAPSULE ORAL 3 TIMES DAILY PRN
Status: DISCONTINUED | OUTPATIENT
Start: 2024-09-03 | End: 2024-09-03 | Stop reason: HOSPADM

## 2024-09-03 RX ORDER — HYDROCHLOROTHIAZIDE 25 MG/1
12.5 TABLET ORAL DAILY
Status: DISCONTINUED | OUTPATIENT
Start: 2024-09-03 | End: 2024-09-03

## 2024-09-03 RX ORDER — ONDANSETRON 2 MG/ML
4 INJECTION INTRAMUSCULAR; INTRAVENOUS EVERY 6 HOURS PRN
Status: DISCONTINUED | OUTPATIENT
Start: 2024-09-03 | End: 2024-09-03 | Stop reason: HOSPADM

## 2024-09-03 RX ORDER — HYDROCHLOROTHIAZIDE 25 MG/1
25 TABLET ORAL DAILY
Qty: 30 TABLET | Refills: 0 | Status: SHIPPED | OUTPATIENT
Start: 2024-09-03

## 2024-09-03 RX ORDER — FLUCONAZOLE 100 MG/1
200 TABLET ORAL ONCE
Status: DISCONTINUED | OUTPATIENT
Start: 2024-09-03 | End: 2024-09-03

## 2024-09-03 RX ORDER — ENOXAPARIN SODIUM 100 MG/ML
40 INJECTION SUBCUTANEOUS DAILY
Status: DISCONTINUED | OUTPATIENT
Start: 2024-09-03 | End: 2024-09-03

## 2024-09-03 RX ORDER — ATORVASTATIN CALCIUM 40 MG/1
40 TABLET, FILM COATED ORAL NIGHTLY
Status: DISCONTINUED | OUTPATIENT
Start: 2024-09-03 | End: 2024-09-03

## 2024-09-03 RX ORDER — TETRAKIS(2-METHOXYISOBUTYLISOCYANIDE)COPPER(I) TETRAFLUOROBORATE 1 MG/ML
31.3 INJECTION, POWDER, LYOPHILIZED, FOR SOLUTION INTRAVENOUS
Status: COMPLETED | OUTPATIENT
Start: 2024-09-03 | End: 2024-09-03

## 2024-09-03 RX ADMIN — KIT FOR THE PREPARATION OF TECHNETIUM TC99M SESTAMIBI 31.3 MILLICURIE: 1 INJECTION, POWDER, LYOPHILIZED, FOR SOLUTION PARENTERAL at 12:32

## 2024-09-03 RX ADMIN — APIXABAN 10 MG: 5 TABLET, FILM COATED ORAL at 15:40

## 2024-09-03 RX ADMIN — IOPAMIDOL 80 ML: 755 INJECTION, SOLUTION INTRAVENOUS at 09:06

## 2024-09-03 RX ADMIN — ASPIRIN 81 MG: 81 TABLET, CHEWABLE ORAL at 13:01

## 2024-09-03 RX ADMIN — HYDROCHLOROTHIAZIDE 25 MG: 25 TABLET ORAL at 13:01

## 2024-09-03 RX ADMIN — KIT FOR THE PREPARATION OF TECHNETIUM TC99M SESTAMIBI 11 MILLICURIE: 1 INJECTION, POWDER, LYOPHILIZED, FOR SOLUTION PARENTERAL at 12:32

## 2024-09-03 RX ADMIN — HYDROXYZINE PAMOATE 25 MG: 25 CAPSULE ORAL at 13:01

## 2024-09-03 RX ADMIN — ATORVASTATIN CALCIUM 40 MG: 40 TABLET, FILM COATED ORAL at 02:44

## 2024-09-03 ASSESSMENT — PAIN DESCRIPTION - ONSET
ONSET: PROGRESSIVE
ONSET: ON-GOING

## 2024-09-03 ASSESSMENT — PAIN DESCRIPTION - FREQUENCY
FREQUENCY: INTERMITTENT
FREQUENCY: INTERMITTENT

## 2024-09-03 ASSESSMENT — PAIN DESCRIPTION - LOCATION
LOCATION: CHEST
LOCATION: CHEST

## 2024-09-03 ASSESSMENT — PAIN DESCRIPTION - PAIN TYPE
TYPE: ACUTE PAIN
TYPE: ACUTE PAIN

## 2024-09-03 ASSESSMENT — PAIN SCALES - GENERAL
PAINLEVEL_OUTOF10: 4
PAINLEVEL_OUTOF10: 2

## 2024-09-03 ASSESSMENT — PAIN DESCRIPTION - ORIENTATION: ORIENTATION: LEFT

## 2024-09-03 ASSESSMENT — PAIN DESCRIPTION - DESCRIPTORS
DESCRIPTORS: ACHING
DESCRIPTORS: ACHING

## 2024-09-03 NOTE — PROGRESS NOTES
Stress test today is negative for ischemia.    Patient was noted to have DVTs bilaterally.  Pain is worse with palpation.  Patient has had recent shoulder surgery back in April but also stated that this pain has been ongoing for prolonged period of time    Concerned that DVTs are actually unprovoked.  Patient will benefit from undergoing further heme-onc workup as outpatient.    Patient initiated on Eliquis 10 mg twice daily for 1 week followed by 5 mg twice daily thereafter.      Patient was also started on Lipitor last night for her chest pain and hyperlipidemia.  Patient stated that following receiving Lipitor she began to have a tight sensation in her throat.  Stated that she would like to hold off on taking statin medication at this time and try lifestyle modifications instead    Recommend outpatient follow-up.    Braeden Tim PA-C 09/03/24 2:29 PM

## 2024-09-03 NOTE — PROGRESS NOTES
Outpatient Pharmacy Progress Note for Meds-to-Beds    Total number of Prescriptions Filled: 2    Additional Documentation:  Medication(s) were delivered to the patient's room prior to discharge      Thank you for letting us serve your patients.  [address] [address]    Phone: [phone]    Fax: [phone]

## 2024-09-03 NOTE — ED NOTES
ED TO INPATIENT SBAR HANDOFF    Patient Name: Abimbola Morales   :  1980  43 y.o.   Preferred Name  Abimbola  Family/Caregiver Present no   Restraints no   C-SSRS: Risk of Suicide: No Risk  Sitter no   Sepsis Risk Score        Situation  Chief Complaint   Patient presents with    Chest Pain     Brief Description of Patient's Condition: Pt came in for chest pain. Pt experiencing some anxiety but chest pain did subside after giving 2 nitro and 4ASA while in the ED. Pt chest pain better but still elevated at times. Pt being admitted to [facility]. Pt recently had thyroid surgery. Pt has IV in LAC. Aox4 and independent of ADLs.   Mental Status: oriented, alert, coherent, logical, thought processes intact, and able to concentrate and follow conversation  Arrived from: home    Imaging:   XR CHEST (2 VW)   Final Result   1. As above.         Electronically signed by Andria Martinez        Abnormal labs:   Abnormal Labs Reviewed   CBC WITH AUTO DIFFERENTIAL - Abnormal; Notable for the following components:       Result Value    Lymphocytes % 45.0 (*)     Monocytes % 6.9 (*)     All other components within normal limits   COMPREHENSIVE METABOLIC PANEL - Abnormal; Notable for the following components:    Alkaline Phosphatase 144 (*)     All other components within normal limits        Background  History:   Past Medical History:   Diagnosis Date    Anxiety     Headache(784.0)     Hypertension     Migraine     UTI (urinary tract infection)        Assessment    Vitals:    Level of Consciousness: Alert (0)   Vitals:    24 2253 24 2302 24 2346 24 2356   BP: (!) 161/100 (!) 141/96 125/76    Pulse: 96 80 71 79   Resp: 26 20 25 24   Temp:       TempSrc:       SpO2:       Weight:       Height:         PO Status: Regular  O2 Flow Rate: O2 Device: None (Room air)    Cardiac Rhythm: NSR   Last documented pain medication administered: none  NIH Score: NIH     Active LDA's:   Peripheral IV 24 Left  None

## 2024-09-03 NOTE — PROGRESS NOTES
4 Eyes Skin Assessment     NAME:  Abimbola Morales  YOB: 1980  MEDICAL RECORD NUMBER:  [id number]    The patient is being assessed for  Admission    I agree that at least one RN has performed a thorough Head to Toe Skin Assessment on the patient. ALL assessment sites listed below have been assessed.      Areas assessed by both nurses:    Head, Face, Ears, Shoulders, Back, Chest, Arms, Elbows, Hands, Sacrum. Buttock, Coccyx, Ischium, and Legs. Feet and Heels        Does the Patient have a Wound? No noted wound(s)       Álvaro Prevention initiated by RN: No  Wound Care Orders initiated by RN: No    Pressure Injury (Stage 3,4, Unstageable, DTI, NWPT, and Complex wounds) if present, place Wound referral order by RN under : No    New Ostomies, if present place, Ostomy referral order under : No     Nurse 1 eSignature: Electronically signed by Janelle Burrell RN on 9/3/24 at 3:38 AM EDT    **SHARE this note so that the co-signing nurse can place an eSignature**    Nurse 2 eSignature: Electronically signed by Heaven Esparza RN on 9/3/24 at 3:41 AM EDT

## 2024-09-03 NOTE — PROGRESS NOTES
Patient seen and examined by Dr. Zhu and I.    Cardiology consulted for chest pain    No prior CAD history. Denies illicit substance abuse    History of HTN, BP poorly controlled at this time.     Plan:  Chest pain + SOB  Elevated D Dimer    CTA pulm pending to rule out PE    Check stress and echo    Full note to follow    Braeden Tim PA-C 09/03/24 8:31 AM

## 2024-09-03 NOTE — ED PROVIDER NOTES
[facility] EMERGENCY DEPARTMENT  EMERGENCY DEPARTMENT ENCOUNTER        Pt Name: Abimbola Morales  MRN: [id number]  Birthdate 1980  Date of evaluation: 9/2/2024  Provider: ALONSO BRIGGS - ALINE  PCP: Milagros Santacruz MD    VICK. I have evaluated this patient.        Triage CHIEF COMPLAINT       Chief Complaint   Patient presents with    Chest Pain         HISTORY OF PRESENT ILLNESS      Chief Complaint: Chest pain    Abimbola Morales is a 43 y.o. female who presents for evaluation of left-sided chest pain and left arm pain.  Patient states that she started having pain of the left shoulder 2 days ago and it was intermittent and associated with some left-sided chest pain.  She states that today she became alarmed because the left-sided chest pain and left arm pain were more intense and persistent and now causing radiating pain into the left side of her neck and upper back.  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  She is able to reproduce the pain to some extent with movement of the shoulder.  She has felt short of breath, dizzy and nauseous at times.  She denies any history of CAD but does have a history of hypertension.  Which had been controlled until recently.  She reports losing a lot of weight and states her primary care doctor took her off her hydrochlorothiazide about a month ago but she believes she may need this back.  She also has history of anxiety and is not sure if this could be contributing as well.    Nursing Notes were all reviewed and agreed with or any disagreements were addressed in the HPI.    REVIEW OF SYSTEMS     Pertinent ROS as noted in HPI.      PAST MEDICAL HISTORY     Past Medical History:   Diagnosis Date    Anxiety     Headache(784.0)     Hypertension     Migraine     UTI (urinary tract infection)        SURGICAL HISTORY     Past Surgical

## 2024-09-03 NOTE — PROGRESS NOTES
V2.0  [facility] Hospitalist Progress Note      Name:  Abimbola Morales /Age/Sex: 1980  (43 y.o. female)   MRN & CSN:  [id number] & [id number] Encounter Date/Time: 9/3/2024 12:17 PM EDT    Location:  OBS  PCP: Milagros Santacruz MD       Hospital Day: 2    Assessment and Plan:   Abimbola Morales is a 43 y.o. female who presents with Chest pain at rest    Atypical chest pain   -Troponin negative x 2  -EKG no acute ST changes  -CTA chest no PE  - cardio consulted, planning for stress and echo today     Bilateral DVTs  - BLE VD with occlusive thrombus in RLE peroneal vein, nonocclusive thrombus in the left femoral and popliteal veins, occlusive thrombus in left peroneal and posterior tibial veins  - unclear cause. Had shoulder surgery 2024 and reports chronic neck pain. No personal or family history of VTE.   - ordered hypercoag work-up   - plan to start Eliquis once stress test results per cardio recs  - outpatient hematology eval     Hypertension, uncontrolled   -HCTZ restarted     Anxiety  -Continue home hydroxyzine as needed    This patient was discussed with Dr. Conley. He was agreeable with the assessment and plan as dictated above.     Current Living situation: home  Expected Disposition: same  Estimated D/C: today if stress negative    Diet Diet NPO   DVT Prophylaxis [x] Lovenox, []  Heparin, [] SCDs, [] Ambulation,  [] Eliquis, [] Xarelto []Coumadin   Code Status Full Code   Disposition Patient requires continued admission due to chest pain   Surrogate Decision Maker/ MILLI Parra     Personally reviewed Lab Studies and Imaging       Subjective:     Chief Complaint: Chest Pain     Patient seen and examined at bedside.  Still having some intermittent chest discomfort.  Discussed plan of care including workup for blood clot and stress test, patient agreeable.      Review of Systems:    Pertinent positives and negatives discussed in HPI    Objective:   No intake or output data in the 24

## 2024-09-03 NOTE — PROGRESS NOTES
This nurse went over discharge instructions and removed PIV with no complications. Patient discharged home and to follow up OP. Instructions given on how to take eliquis.

## 2024-09-03 NOTE — PROGRESS NOTES
Spiritual Health Assessment/Progress Note  [facility]    Initial Encounter,  ,  ,      Name: Abimbola Morales MRN: [id number]    Age: 43 y.o.     Sex: female   Language: English   Pentecostalism: Shinto   Chest pain at rest     Date: 9/3/2024            Total Time Calculated: 15 min              Spiritual Assessment began in [facility] OBSERVATION        Referral/Consult From: Rounding   Encounter Overview/Reason: Initial Encounter  Service Provided For: Patient    Ni, Belief, Meaning:   Patient has beliefs or practices that help with coping during difficult times  Family/Friends No family/friends present      Importance and Influence:  Patient has spiritual/personal beliefs that influence decisions regarding their health  Family/Friends no family/friends present    Community:  Patient feels well-supported. Support system includes: Spouse/Partner  Family/Friends Other: unknown    Assessment and Plan of Care:     Patient Interventions include: Facilitated expression of thoughts and feelings  Family/Friends Interventions include: Other: Family no tpresent    Patient Plan of Care: Spiritual Care available upon further referral  Family/Friends Plan of Care: Spiritual Care available upon further referral    Electronically signed by Chaplain Nell on 9/3/2024 at 9:47 AM

## 2024-09-03 NOTE — H&P
History and Physical      Name:  Abimbola Morales /Age/Sex: 1980  (43 y.o. female)   MRN & CSN:  [id number] & [id number] Encounter Date/Time: 9/3/2024 12:13 AM EDT   Location:  [facility] PCP: Milagros Santacruz MD       Assessment and Plan:   Abimbola Morales is a 43 y.o. female with gestational HTN/ hypertension presented from home due to chest pain    Atypical chest pain rule out ACS  Troponin negative x 2 EKG personally reviewed normal sinus rhythm, no acute ST-T wave abnormalities.  Chest x-ray reviewed bilateral atelectasis, similar to CT chest from 2024  Aspirin and Lipitor  UDS and TSH w/ reflex  Resume home Hydroxyzine  NPO for cardiology evaluation in a.m.    Hypertension, uncontrolled  Resume hydrochlorothiazide which was discontinued 2 months ago    Anxiety  Continue home as needed Hydroxyzine    Obesity/ BMI 37.69    Observation MedSurg telemetry  Full code    Disposition:     Current Living situation: Home  Expected Disposition: Home  Estimated D/C: 2 days    Diet Diet NPO   DVT Prophylaxis [x] Lovenox, []  Heparin, [] SCDs, [] Ambulation,  [] Eliquis, [] Xarelto, [] Coumadin   Code Status Full Code   Surrogate Decision Maker/ MILLI DavalosLeila, spouse     Personally reviewed Lab Studies and Imaging   Discussed management of the case with ER provider who recommended admission for chest pain evaluation    History from:     Patient     History of Present Illness:     Chief Complaint: chest pain    Abimbola Morales is a 43 y.o. female with gestational HTN/ hypertension presented from home due to chest pain.  Saturday morning left arm pain, couldn't lift it since then, chest pain intermittent initially and then became constant today, pressure like sensation, associated with shortness of breath. Elevated blood pressure trend per RN at work. Stopped HCTZ 2 months ago due to weight loss?  During my evaluation patient was alert oriented x 3 hemodynamically stable.  States that her chest pain has

## 2024-09-03 NOTE — PROGRESS NOTES
Progress Note  Date:9/3/2024       Room:OBS /DDM16-51  Patient Name:Abimbola Morales     YOB: 1980     Age:43 y.o.        Subjective    Subjective   Review of Systems  Objective         Vitals Last 24 Hours:  TEMPERATURE:  Temp  Av °F (36.7 °C)  Min: 97.9 °F (36.6 °C)  Max: 98.1 °F (36.7 °C)  RESPIRATIONS RANGE: Resp  Av.5  Min: 16  Max: 27  PULSE OXIMETRY RANGE: SpO2  Av %  Min: 96 %  Max: 100 %  PULSE RANGE: Pulse  Av.8  Min: 71  Max: 100  BLOOD PRESSURE RANGE: Systolic (24hrs), Av , Min:119 , Max:165   ; Diastolic (24hrs), Av, Min:76, Max:111    I/O (24Hr):  No intake or output data in the 24 hours ending 24 0948  Objective:  Vital signs: (most recent): Blood pressure (!) 137/97, pulse 78, temperature 97.9 °F (36.6 °C), temperature source Oral, resp. rate 16, height 1.524 m (5'), weight 91.3 kg (201 lb 4.5 oz), SpO2 100%.      Labs/Imaging/Diagnostics    Labs:  CBC:  Recent Labs     243   WBC 8.8   RBC 4.49   HGB 13.1   HCT 39.2   MCV 87.3   RDW 13.5        CHEMISTRIES:  Recent Labs     24  2143      K 3.6      CO2 25   BUN 16   CREATININE 0.8   GLUCOSE 84     PT/INR:No results for input(s): \"PROTIME\", \"INR\" in the last 72 hours.  APTT:No results for input(s): \"APTT\" in the last 72 hours.  LIVER PROFILE:  Recent Labs     24  2143   AST 22   ALT 24   BILITOT 0.2   ALKPHOS 144*       Imaging Last 24 Hours:  XR CHEST (2 VW)    Result Date: 2024  Chest X-ray INDICATION: Chest pain COMPARISON:  None TECHNIQUE: PA and lateral views of the chest were obtained. FINDINGS: The lungs show bilateral lung airspace disease, no pleural effusion and densities projected over the cervical spine, evaluate for surgical hardware or foreign bodies. The heart is prominent in size..  The bony thorax is intact.     1. As above. Electronically signed by Andria Martinez    Assessment//Plan           Hospital Problems             Last Modified

## 2024-09-03 NOTE — CARE COORDINATION
Chart reviewed. 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 She has a PCP and insurance that assists with Rx when needed. Thao Douglas RN

## 2024-09-03 NOTE — CONSULTS
Concerning for angina pectoralis, retrosternal chest pressure radiating to her arm    EKG NSR/no ischemic changes.   Order Stress MPI for Risk Stratification.     Order Echocardiogram.        Hyperlipidemia: On Lipitor.  Continue    Essential Hypertension: Patient was previously on hydrochlorothiazide which was discontinued by PCP recently due to low blood pressure.  However she has been hypertensive.  Start patient on hydrochlorothiazide 25 mg daily, compared to 50 mg which she was taking previously.      Health maintenance: Risk Factor Modification. Advise exercise as tolerated and low salt low fat diet.       Thank you for the consult    Dr. GARETT Zhu  9/3/2024 9:16 AM

## 2024-09-03 NOTE — DISCHARGE INSTRUCTIONS
While in the hospital, you were found to have blood clots in your legs (DVTs). You have been started on Eliquis. While on Eliquis, do not take antiinflammatories like ibuprofen/Advil, naproxen/Aleve as these can increase your risk of bleeding. You will need to be on Eliquis for at least 3 months. Refills have been sent to the pharmacy. Hematology will help decide if you need to be on it for longer. You can call your pharmacy to have your prescription transferred to your pharmacy if you wish.     Check your blood pressure daily and keep a log to bring to your follow-up appointments.

## 2024-09-03 NOTE — ED NOTES
12 lead EKG as interpreted by me reveals normal sinus rhythm. Axis is normal. There are no ischemic ST elevations or other suspicious ST changes;  QRS interval is narrow, QT interval is not prolonged. Final interpretation: Normal sinus rhythm.       Ludin Briggs MD  09/02/24 [id number]

## 2024-09-03 NOTE — DISCHARGE SUMMARY
tablet        Objective Findings at Discharge:   BP (!) 150/101   Pulse 81   Temp 97.9 °F (36.6 °C) (Oral)   Resp 16   Ht 1.524 m (5')   Wt 91.3 kg (201 lb 4.5 oz)   SpO2 100%   BMI 39.31 kg/m²       Physical Exam:   General: NAD  Eyes: EOMI  ENT: neck supple  Cardiovascular: Regular rate.  Respiratory: Clear to auscultation bilaterally  Gastrointestinal: Abdomen soft, non tender  Genitourinary: no suprapubic tenderness  Musculoskeletal: No edema  Skin: warm, dry  Neuro: Alert.  Psych: Mood appropriate.         Labs and Imaging   Nuclear stress test with myocardial perfusion    Result Date: 9/3/2024    Stress Combined Conclusion: Normal treadmill myocardial perfusion study at 85% PHR. Findings suggest a low risk of cardiac events.   Perfusion Comments: LV perfusion is normal.   Perfusion Conclusion: There is no evidence of transient ischemic dilation (TID).   Image quality is good.  Normal Exercise Stress MPI Motion artifact noted during stress part No inducible ischemia appreciated otherwise Risk Factor Modification ______________________________________________ (Images reviewed in [employer], for optimal quality)     Vascular duplex lower extremity venous bilateral    Result Date: 9/3/2024  Bilateral lower extremity venous ultrasound INDICATION:  Pain and swelling Doppler ultrasound of both lower extremities was performed. COMPARISON:  None FINDINGS:  There is normal flow in the right great saphenous, common femoral, femoral, and popliteal veins. Normal compression and augmentation is demonstrated. There is occlusive thrombus noted in the peroneal vein. There is normal flow in the left common femoral and great saphenous veins. There is nonocclusive thrombus in the femoral, and popliteal veins. There is occlusive thrombus in the peroneal and posterior tibial veins.     1. Occlusive thrombus in the peroneal vein in the right lower extremity. 2. Occlusive and nonocclusive thrombus in the left lower extremity, as

## 2024-09-04 LAB
EKG ATRIAL RATE: 73 BPM
EKG ATRIAL RATE: 88 BPM
EKG DIAGNOSIS: NORMAL
EKG DIAGNOSIS: NORMAL
EKG P AXIS: 42 DEGREES
EKG P AXIS: 43 DEGREES
EKG P-R INTERVAL: 144 MS
EKG P-R INTERVAL: 154 MS
EKG Q-T INTERVAL: 354 MS
EKG Q-T INTERVAL: 382 MS
EKG QRS DURATION: 80 MS
EKG QRS DURATION: 82 MS
EKG QTC CALCULATION (BAZETT): 420 MS
EKG QTC CALCULATION (BAZETT): 428 MS
EKG R AXIS: 19 DEGREES
EKG R AXIS: 6 DEGREES
EKG T AXIS: 16 DEGREES
EKG T AXIS: 43 DEGREES
EKG VENTRICULAR RATE: 73 BPM
EKG VENTRICULAR RATE: 88 BPM

## 2024-09-04 PROCEDURE — 93010 ELECTROCARDIOGRAM REPORT: CPT | Performed by: INTERNAL MEDICINE

## 2024-09-05 ENCOUNTER — TELEPHONE (OUTPATIENT)
Dept: CARDIOLOGY CLINIC | Age: 44
End: 2024-09-05

## 2024-09-05 LAB
ANTICARDIOLIPIN IGG ANTIBODY: 10 GPL
AT III ACT/NOR PPP CHRO: 128 % (ref 76–128)
CARDIOLIPIN IGA SER IA-ACNC: <10 APL
CARDIOLIPIN IGM SER IA-ACNC: <10 MPL
PROTEIN C ACTIVITY: 186 % (ref 83–168)
PROTEIN S ACTIVITY: 124 % (ref 57–131)

## 2024-09-05 NOTE — TELEPHONE ENCOUNTER
Pt was in the hosp and released today and told to michele hubbard/dr cannon. She saw him in the hosp but never been here. She said she is experiencing chest pain,pains in left arm, bp 160/95

## 2024-09-08 LAB — F5 P.R506Q BLD/T QL: NEGATIVE

## 2024-09-09 LAB — F2 C.20210G>A GENO BLD/T: NEGATIVE

## 2024-09-17 ENCOUNTER — INITIAL CONSULT (OUTPATIENT)
Dept: ONCOLOGY | Age: 44
End: 2024-09-17
Payer: COMMERCIAL

## 2024-09-17 ENCOUNTER — HOSPITAL ENCOUNTER (OUTPATIENT)
Dept: INFUSION THERAPY | Age: 44
Discharge: HOME | End: 2024-09-17
Payer: COMMERCIAL

## 2024-09-17 VITALS
SYSTOLIC BLOOD PRESSURE: 126 MMHG | RESPIRATION RATE: 18 BRPM | HEIGHT: 60 IN | HEART RATE: 84 BPM | OXYGEN SATURATION: 97 % | DIASTOLIC BLOOD PRESSURE: 80 MMHG | BODY MASS INDEX: 37.15 KG/M2 | WEIGHT: 189.2 LBS | TEMPERATURE: 97.9 F

## 2024-09-17 DIAGNOSIS — I82.413 DVT OF DEEP FEMORAL VEIN, BILATERAL (HCC): Primary | ICD-10-CM

## 2024-09-17 DIAGNOSIS — I82.413 DVT OF DEEP FEMORAL VEIN, BILATERAL (HCC): ICD-10-CM

## 2024-09-17 PROCEDURE — G8427 DOCREV CUR MEDS BY ELIG CLIN: HCPCS | Performed by: INTERNAL MEDICINE

## 2024-09-17 PROCEDURE — 36415 COLL VENOUS BLD VENIPUNCTURE: CPT

## 2024-09-17 PROCEDURE — 99211 OFF/OP EST MAY X REQ PHY/QHP: CPT

## 2024-09-17 PROCEDURE — 85670 THROMBIN TIME PLASMA: CPT

## 2024-09-17 PROCEDURE — 85306 CLOT INHIBIT PROT S FREE: CPT

## 2024-09-17 PROCEDURE — 85303 CLOT INHIBIT PROT C ACTIVITY: CPT

## 2024-09-17 PROCEDURE — 99204 OFFICE O/P NEW MOD 45 MIN: CPT | Performed by: INTERNAL MEDICINE

## 2024-09-17 PROCEDURE — 85613 RUSSELL VIPER VENOM DILUTED: CPT

## 2024-09-17 PROCEDURE — 86146 BETA-2 GLYCOPROTEIN ANTIBODY: CPT

## 2024-09-17 PROCEDURE — 85730 THROMBOPLASTIN TIME PARTIAL: CPT

## 2024-09-17 PROCEDURE — 85220 BLOOC CLOT FACTOR V TEST: CPT

## 2024-09-17 PROCEDURE — 85610 PROTHROMBIN TIME: CPT

## 2024-09-17 PROCEDURE — 1036F TOBACCO NON-USER: CPT | Performed by: INTERNAL MEDICINE

## 2024-09-17 PROCEDURE — G8417 CALC BMI ABV UP PARAM F/U: HCPCS | Performed by: INTERNAL MEDICINE

## 2024-09-17 RX ORDER — CYANOCOBALAMIN/FOLIC AC/VIT B6 2-2.5-25MG
1 TABLET ORAL DAILY
Qty: 30 TABLET | Refills: 5 | Status: SHIPPED | OUTPATIENT
Start: 2024-09-17

## 2024-09-17 ASSESSMENT — PATIENT HEALTH QUESTIONNAIRE - PHQ9
SUM OF ALL RESPONSES TO PHQ QUESTIONS 1-9: 0
2. FEELING DOWN, DEPRESSED OR HOPELESS: NOT AT ALL
SUM OF ALL RESPONSES TO PHQ QUESTIONS 1-9: 0
1. LITTLE INTEREST OR PLEASURE IN DOING THINGS: NOT AT ALL
SUM OF ALL RESPONSES TO PHQ9 QUESTIONS 1 & 2: 0

## 2024-09-19 ENCOUNTER — INITIAL CONSULT (OUTPATIENT)
Dept: CARDIOLOGY CLINIC | Age: 44
End: 2024-09-19
Payer: COMMERCIAL

## 2024-09-19 VITALS
DIASTOLIC BLOOD PRESSURE: 72 MMHG | WEIGHT: 192 LBS | SYSTOLIC BLOOD PRESSURE: 110 MMHG | BODY MASS INDEX: 37.69 KG/M2 | HEIGHT: 60 IN | OXYGEN SATURATION: 97 % | HEART RATE: 87 BPM

## 2024-09-19 DIAGNOSIS — I83.893 VARICOSE VEINS OF BOTH LEGS WITH EDEMA: ICD-10-CM

## 2024-09-19 DIAGNOSIS — R00.2 PALPITATIONS: Primary | ICD-10-CM

## 2024-09-19 DIAGNOSIS — R07.9 CHEST PAIN AT REST: ICD-10-CM

## 2024-09-19 DIAGNOSIS — I82.493 ACUTE DEEP VEIN THROMBOSIS (DVT) OF OTHER SPECIFIED VEIN OF BOTH LOWER EXTREMITIES (HCC): ICD-10-CM

## 2024-09-19 PROBLEM — I82.409 DVT (DEEP VENOUS THROMBOSIS) (HCC): Status: ACTIVE | Noted: 2024-09-19

## 2024-09-19 PROCEDURE — 99204 OFFICE O/P NEW MOD 45 MIN: CPT | Performed by: INTERNAL MEDICINE

## 2024-09-19 PROCEDURE — G8417 CALC BMI ABV UP PARAM F/U: HCPCS | Performed by: INTERNAL MEDICINE

## 2024-09-19 PROCEDURE — G8427 DOCREV CUR MEDS BY ELIG CLIN: HCPCS | Performed by: INTERNAL MEDICINE

## 2024-09-19 PROCEDURE — 93000 ELECTROCARDIOGRAM COMPLETE: CPT | Performed by: INTERNAL MEDICINE

## 2024-09-19 PROCEDURE — 1036F TOBACCO NON-USER: CPT | Performed by: INTERNAL MEDICINE

## 2024-09-20 LAB
PROT C ACT/NOR PPP: 147 % (ref 83–168)
PROTEIN S, FUNCTIONAL: 102 % (ref 57–131)

## 2024-09-21 LAB
CONFIRM APTT STACLOT: ABNORMAL S
DRVVT SCREEN TO CONFIRM RATIO: ABNORMAL {RATIO}
HEPARIN NT PPP QL: ABNORMAL
LA 3 SCREEN W REFLEX-IMP: ABNORMAL
LMW HEPARIN IND PLT AB SER QL: PRESENT
MIXING DRVVT: ABNORMAL S
NEUTRALIZED DRVVT SCREEN RATIO: 1.12
PROTHROMBIN TIME: 15.6 S (ref 12–15.5)
SCREEN APTT: 1.14 S
SCREEN APTT: 1.25 S
SCREEN DRVVT: 1.7 S
THROMBIN TIME: 22.9 S

## 2024-09-22 NOTE — PROGRESS NOTES
Patient Name:  Abimbola Morales  Patient :  1980  Patient MRN:  [id number]     Primary Oncologist: Fide Smith MD  Referring Provider: Milagros Santacruz MD     Date of Service: 10/18/2024       Chief Complaint:    Chief Complaint   Patient presents with    Follow-up     FU visit.     Patient Active Problem List:     Headache     Left-sided weakness     Slurred speech     Less than 8 weeks gestation of pregnancy     Vision changes     Superior labrum anterior-to-posterior (SLAP) tear of right shoulder     S/P arthroscopy of right shoulder     Chest pain at rest     Elevated d-dimer     HPI:   Chriss Morales is a pleasant 44 year old female patient who was referred for bilateral LE DVT.  She had COVID infection x 4.  7 months ago and 3 weeks ago she had COVID infection.  9/3/2024 went to ED with atypical chest pain.   9/3/2024 CTA chest: No acute PE. Sequela of old granulomatous disease  She has been having pain to left thigh for awhile.  9/3/2024 VL LE:  1. Occlusive thrombus in the peroneal vein in the right lower extremity.  2. Occlusive and nonocclusive thrombus in the left lower extremity, as described.  Blood clot could be related to COVID infection.  9/3/2024 AT III wnl. No prothrombin and FV Leiden mutation. Protein S activity wnl. Protein C activity 186H.  Anti cardiolipin IgG and IgM wnl. Homocysteine 10.8H.  I prescribed the Westab.  I ordered remaining  hyper coag study.  Recommended compression stocking and referred to cardiologist for Venous reflux study.  Currently on Eliquis. 3 mo AC at least recommended.  She is otherwise active.  Denies any recent clinical history or surgery.  No trauma.  Denies any hormonal supplement.  No prior history of blood clot.  No family history of blood clot.    She has 4 children.  3 uncles and 2 aunts from the father side lung have cancer.  They are smokers.  Reports mammogram in 2024.    10/18/2024 follow up visit.  On DOAC for 3 months.  2024 no

## 2024-09-23 LAB
B2 GLYCOPROT1 IGA SERPL IA-ACNC: 8.7 ELISA U/ML (ref 0–7)
B2 GLYCOPROT1 IGG SERPL IA-ACNC: 1.8 ELISA U/ML (ref 0–7)
B2 GLYCOPROT1 IGM SERPL IA-ACNC: 9.4 ELISA U/ML (ref 0–7)

## 2024-10-02 ENCOUNTER — OFFICE VISIT (OUTPATIENT)
Dept: ORTHOPEDIC SURGERY | Age: 44
End: 2024-10-02
Payer: COMMERCIAL

## 2024-10-02 VITALS — DIASTOLIC BLOOD PRESSURE: 84 MMHG | OXYGEN SATURATION: 98 % | HEART RATE: 74 BPM | SYSTOLIC BLOOD PRESSURE: 122 MMHG

## 2024-10-02 DIAGNOSIS — M54.12 CERVICAL RADICULOPATHY: Primary | ICD-10-CM

## 2024-10-02 PROCEDURE — 1036F TOBACCO NON-USER: CPT | Performed by: STUDENT IN AN ORGANIZED HEALTH CARE EDUCATION/TRAINING PROGRAM

## 2024-10-02 PROCEDURE — G8417 CALC BMI ABV UP PARAM F/U: HCPCS | Performed by: STUDENT IN AN ORGANIZED HEALTH CARE EDUCATION/TRAINING PROGRAM

## 2024-10-02 PROCEDURE — G8484 FLU IMMUNIZE NO ADMIN: HCPCS | Performed by: STUDENT IN AN ORGANIZED HEALTH CARE EDUCATION/TRAINING PROGRAM

## 2024-10-02 PROCEDURE — G8427 DOCREV CUR MEDS BY ELIG CLIN: HCPCS | Performed by: STUDENT IN AN ORGANIZED HEALTH CARE EDUCATION/TRAINING PROGRAM

## 2024-10-02 PROCEDURE — 99213 OFFICE O/P EST LOW 20 MIN: CPT | Performed by: STUDENT IN AN ORGANIZED HEALTH CARE EDUCATION/TRAINING PROGRAM

## 2024-10-02 RX ORDER — METHYLPREDNISOLONE 4 MG
TABLET, DOSE PACK ORAL
Qty: 1 KIT | Refills: 0 | Status: SHIPPED | OUTPATIENT
Start: 2024-10-02 | End: 2024-10-08

## 2024-10-02 RX ORDER — CYCLOBENZAPRINE HCL 5 MG
5 TABLET ORAL 2 TIMES DAILY PRN
Qty: 30 TABLET | Refills: 0 | Status: SHIPPED | OUTPATIENT
Start: 2024-10-02 | End: 2024-10-12

## 2024-10-02 ASSESSMENT — ENCOUNTER SYMPTOMS
SHORTNESS OF BREATH: 0
EYE PAIN: 0
VOMITING: 0
NAUSEA: 0
WHEEZING: 0
ABDOMINAL PAIN: 0
EYE REDNESS: 0
FACIAL SWELLING: 0
STRIDOR: 0
COLOR CHANGE: 0
COUGH: 0
PHOTOPHOBIA: 0
BACK PAIN: 0

## 2024-10-02 NOTE — PATIENT INSTRUCTIONS
Cervical spine xrays ordered. These can be completed without an appointment at the [facility] at [address] [place] [address].     EMG Ordered, to be completed by Neurology.

## 2024-10-02 NOTE — PROGRESS NOTES
Take 1 tablet by mouth 3 times daily as needed for Nausea or Vomiting 21 tablet 0    Saccharomyces boulardii (PROBIOTIC) 250 MG CAPS Take by mouth      Multiple Vitamins-Minerals (THERAPEUTIC MULTIVITAMIN-MINERALS) tablet Take 1 tablet by mouth daily      hydrOXYzine pamoate (VISTARIL) 25 MG capsule Take 1 capsule by mouth 3 times daily as needed for Itching       No current facility-administered medications for this visit.     Allergies   Allergen Reactions    Lipitor [Atorvastatin] Other (See Comments)     Throat became tight following administration of lipitor    Phenergan [Promethazine Hcl] Other (See Comments)     \"everything happens, I get anxious\"    Toradol [Ketorolac Tromethamine] Other (See Comments)     \"makes my vagina burn\"         Review of Systems   Constitutional:  Negative for activity change, appetite change, chills, diaphoresis, fatigue, fever and unexpected weight change.   HENT:  Negative for congestion, ear pain, facial swelling, hearing loss and sneezing.    Eyes:  Negative for photophobia, pain and redness.   Respiratory:  Negative for cough, shortness of breath, wheezing and stridor.    Cardiovascular:  Negative for chest pain, palpitations and leg swelling.   Gastrointestinal:  Negative for abdominal pain, nausea and vomiting.   Endocrine: Negative for cold intolerance and heat intolerance.   Musculoskeletal:  Positive for arthralgias, myalgias and neck pain. Negative for back pain, gait problem, joint swelling and neck stiffness.   Skin:  Negative for color change, pallor, rash and wound.   Neurological:  Negative for dizziness, facial asymmetry, weakness and numbness.                                                   Examination:  General Exam:  Vitals: There were no vitals taken for this visit.   Physical Exam  Constitutional:       General: She is not in acute distress.     Appearance: Normal appearance. She is obese.   HENT:      Head: Normocephalic and atraumatic.      Nose: Nose

## 2024-10-08 ENCOUNTER — TELEPHONE (OUTPATIENT)
Dept: CARDIOLOGY CLINIC | Age: 44
End: 2024-10-08

## 2024-10-08 NOTE — TELEPHONE ENCOUNTER
Patient continues to have leg pain, she is on eliquis for DVT, wearing compression stockings. Per Dr Vo may apply heat to painful area. Patient to keep doppler appt tomorrow

## 2024-10-08 NOTE — TELEPHONE ENCOUNTER
Pt calling about pain in left leg.   Was in the crease of knee but today it is in the crease of her hip.  Pt has been taking eliquis and using compression stockings.  Call and advise.

## 2024-10-09 ENCOUNTER — TELEPHONE (OUTPATIENT)
Dept: CARDIOLOGY CLINIC | Age: 44
End: 2024-10-09

## 2024-10-09 NOTE — TELEPHONE ENCOUNTER
Next OV on 10/14/24.    Notified of abnormal results and next OV         Vascular US Duplex Lower Extremity Venous Bilateral         No evidence of DVT noted.  Change from previous study.    Significant reflux of bilateral CFV and left PopV.     Advise thigh high pressure stockings, 20 to 30 mm of Hg.   Keep feet elevated.   Increase walking.     Arrange OV to review the results with the patient & make recommendations.

## 2024-10-14 ENCOUNTER — OFFICE VISIT (OUTPATIENT)
Dept: CARDIOLOGY CLINIC | Age: 44
End: 2024-10-14
Payer: COMMERCIAL

## 2024-10-14 VITALS
OXYGEN SATURATION: 99 % | DIASTOLIC BLOOD PRESSURE: 78 MMHG | BODY MASS INDEX: 38.52 KG/M2 | WEIGHT: 196.2 LBS | SYSTOLIC BLOOD PRESSURE: 126 MMHG | HEART RATE: 96 BPM | HEIGHT: 60 IN

## 2024-10-14 DIAGNOSIS — I83.893 VARICOSE VEINS OF BOTH LEGS WITH EDEMA: Primary | ICD-10-CM

## 2024-10-14 DIAGNOSIS — R07.9 CHEST PAIN AT REST: ICD-10-CM

## 2024-10-14 DIAGNOSIS — R00.2 PALPITATIONS: ICD-10-CM

## 2024-10-14 DIAGNOSIS — I82.493 ACUTE DEEP VEIN THROMBOSIS (DVT) OF OTHER SPECIFIED VEIN OF BOTH LOWER EXTREMITIES (HCC): ICD-10-CM

## 2024-10-14 PROCEDURE — 1036F TOBACCO NON-USER: CPT | Performed by: INTERNAL MEDICINE

## 2024-10-14 PROCEDURE — G8417 CALC BMI ABV UP PARAM F/U: HCPCS | Performed by: INTERNAL MEDICINE

## 2024-10-14 PROCEDURE — G8427 DOCREV CUR MEDS BY ELIG CLIN: HCPCS | Performed by: INTERNAL MEDICINE

## 2024-10-14 PROCEDURE — 99214 OFFICE O/P EST MOD 30 MIN: CPT | Performed by: INTERNAL MEDICINE

## 2024-10-14 PROCEDURE — G8484 FLU IMMUNIZE NO ADMIN: HCPCS | Performed by: INTERNAL MEDICINE

## 2024-10-14 RX ORDER — METOPROLOL SUCCINATE 25 MG/1
25 TABLET, EXTENDED RELEASE ORAL DAILY
Qty: 30 TABLET | Refills: 5 | Status: SHIPPED | OUTPATIENT
Start: 2024-10-14

## 2024-10-14 NOTE — PROGRESS NOTES
OFFICE PROGRESS NOTES      Abimbola is a 44 y.o. female who has    CHIEF COMPLAINT AS FOLLOWS:  CHEST PAIN: Patient denies any C/O chest pains at this time.                               SOB: No C/O SOB at this time.                 LEG EDEMA: Patient complains of leg edema, restless legs, nighttime cramps, aching, weak and fatigued legs reportedly she has a  job where she has to stand for 14 hours at a time.  Understandably her grandmother had varicose veins.    PALPITATIONS: Denies any C/O Palpitations                                  C/O brief episodes of palpitations, which are not frequent.   DIZZINESS: No C/O Dizziness                          C/O positional Dizziness but no black out spells.   SYNCOPE: None   OTHER/ ADDITIONAL COMPLAINTS:                                     HPI: Patient is here for F/U on her CVI, TACHY-Arrhythmia & HTN problems.   CVI: Deep vein Reflux  Arrhythmia: Patient has sinus tachycardia.  HTN: Patient has known essential HTN. Has been treated with guideline recommended medical / physical/ diet therapy as stated below.  Dyslipidemia: Patient has known mixed dyslipidemia. Has been treated with guideline recommended medical / physical/ diet therapy as stated below.                Current Outpatient Medications   Medication Sig Dispense Refill    folic acid-pyridoxine-cyancobalamin 2.5-25-2 mg tablet (WESTAB MAX) 2.5-25-2 MG TABS Take 1 tablet by mouth daily 30 tablet 5    apixaban (ELIQUIS) 5 MG TABS tablet Take 2 tablets by mouth 2 times daily for 7 days, THEN 1 tablet 2 times daily for 23 days. 74 tablet 2    hydroCHLOROthiazide (HYDRODIURIL) 25 MG tablet Take 1 tablet by mouth daily 30 tablet 0    albuterol sulfate HFA (PROVENTIL HFA) 108 (90 Base) MCG/ACT inhaler Inhale 2 puffs into the lungs every 6 hours as needed for Wheezing or Shortness of Breath 18 g 0    cyclobenzaprine (FLEXERIL) 10 MG tablet Take 1 tablet by mouth nightly 30 tablet 2    ondansetron (ZOFRAN-ODT) 4 MG

## 2024-10-14 NOTE — PATIENT INSTRUCTIONS
Please be informed that if you contact our office outside of normal business hours the physician on call cannot help with any scheduling or rescheduling issues, procedure instruction questions or any type of medication issue.    We advise you for any urgent/emergency that you go to the nearest emergency room!    PLEASE CALL OUR OFFICE DURING NORMAL BUSINESS HOURS    Monday - Friday   8 am to 5 pm    [place]: [phone]    [place]: [phone]    [place]:  [phone]    **It is YOUR responsibilty to bring medication bottles and/or updated medication list to EACH APPOINTMENT. This will allow us to better serve you and all your healthcare needs**    Thank you for allowing us to care for you today!   We want to ensure we can follow your treatment plan and we strive to give you the best outcomes and experience possible.   If you ever have a life threatening emergency and call 911 - for an ambulance (EMS)   Our providers can only care for you at:   [facility] or [facility].   Even if you have someone take you or you drive yourself we can only care for you in a [facility] facility. Our providers are not setup at the other healthcare locations!     DVT: Patient apparently has unprovoked DVTs in multiple planes possibility of the problem secondary to COVID infection cannot be excluded she is being evaluated by Dr. Smith.  She is now referred for evaluation to rule out any venous reflux problem due to post thrombotic syndrome.    9/3/2024 VL LE:  1. Occlusive thrombus in the peroneal vein in the right lower extremity.  2. Occlusive and nonocclusive thrombus in the left lower extremity, as described.  Blood clot could be related to COVID infection.  9/3/2024 AT III wnl. No prothrombin and FV Leiden mutation. Protein S activity wnl. Protein C activity 186H.  Anti cardiolipin IgG and IgM wnl. Homocysteine 10.8H    Patient is being treated with Eliquis.    10/09/2024    No evidence of DVT

## 2024-10-18 ENCOUNTER — OFFICE VISIT (OUTPATIENT)
Dept: ONCOLOGY | Age: 44
End: 2024-10-18
Payer: COMMERCIAL

## 2024-10-18 ENCOUNTER — HOSPITAL ENCOUNTER (OUTPATIENT)
Dept: INFUSION THERAPY | Age: 44
Discharge: HOME | End: 2024-10-18
Payer: COMMERCIAL

## 2024-10-18 VITALS
BODY MASS INDEX: 38.48 KG/M2 | TEMPERATURE: 97.8 F | HEIGHT: 60 IN | HEART RATE: 75 BPM | DIASTOLIC BLOOD PRESSURE: 65 MMHG | WEIGHT: 196 LBS | OXYGEN SATURATION: 99 % | SYSTOLIC BLOOD PRESSURE: 114 MMHG

## 2024-10-18 DIAGNOSIS — I82.413 DVT OF DEEP FEMORAL VEIN, BILATERAL (HCC): Primary | ICD-10-CM

## 2024-10-18 PROCEDURE — G8427 DOCREV CUR MEDS BY ELIG CLIN: HCPCS | Performed by: INTERNAL MEDICINE

## 2024-10-18 PROCEDURE — G8417 CALC BMI ABV UP PARAM F/U: HCPCS | Performed by: INTERNAL MEDICINE

## 2024-10-18 PROCEDURE — 99213 OFFICE O/P EST LOW 20 MIN: CPT | Performed by: INTERNAL MEDICINE

## 2024-10-18 PROCEDURE — 1036F TOBACCO NON-USER: CPT | Performed by: INTERNAL MEDICINE

## 2024-10-18 PROCEDURE — G8484 FLU IMMUNIZE NO ADMIN: HCPCS | Performed by: INTERNAL MEDICINE

## 2024-10-18 PROCEDURE — 99211 OFF/OP EST MAY X REQ PHY/QHP: CPT

## 2024-10-18 NOTE — PROGRESS NOTES
MA Rooming Questions  Patient: Abimbola Morales  MRN: [id number]    Date: 10/18/2024        1. Do you have any new issues?   yes - right pinky toe goes numb and it comes and goes x 2 days         2. Do you need any refills on medications?    no    3. Have you had any imaging done since your last visit?   yes - u/s 10-9    4. Have you been hospitalized or seen in the emergency room since your last visit here?   no    5. Did the patient have a depression screening completed today? No    No data recorded     PHQ-9 Given to (if applicable):               PHQ-9 Score (if applicable):                     [] Positive     []  Negative              Does question #9 need addressed (if applicable)                     [] Yes    []  No               Smita Tolliver CMA       The patient is a 74y Female complaining of nausea.

## 2024-11-10 ENCOUNTER — HOSPITAL ENCOUNTER (EMERGENCY)
Age: 44
Discharge: HOME | End: 2024-11-10
Attending: EMERGENCY MEDICINE
Payer: COMMERCIAL

## 2024-11-10 ENCOUNTER — APPOINTMENT (OUTPATIENT)
Dept: CT IMAGING | Age: 44
End: 2024-11-10
Payer: COMMERCIAL

## 2024-11-10 ENCOUNTER — APPOINTMENT (OUTPATIENT)
Dept: GENERAL RADIOLOGY | Age: 44
End: 2024-11-10
Attending: EMERGENCY MEDICINE
Payer: COMMERCIAL

## 2024-11-10 VITALS
OXYGEN SATURATION: 100 % | TEMPERATURE: 97.7 F | DIASTOLIC BLOOD PRESSURE: 84 MMHG | RESPIRATION RATE: 20 BRPM | SYSTOLIC BLOOD PRESSURE: 120 MMHG | HEART RATE: 83 BPM
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
## 2024-11-10 PROCEDURE — 72128 CT CHEST SPINE W/O DYE: CPT

## 2024-11-10 PROCEDURE — 70450 CT HEAD/BRAIN W/O DYE: CPT

## 2024-11-10 PROCEDURE — 72131 CT LUMBAR SPINE W/O DYE: CPT

## 2024-11-10 PROCEDURE — 72125 CT NECK SPINE W/O DYE: CPT

## 2024-11-10 PROCEDURE — 6370000000 HC RX 637 (ALT 250 FOR IP): Performed by: EMERGENCY MEDICINE

## 2024-11-10 PROCEDURE — 71045 X-RAY EXAM CHEST 1 VIEW: CPT

## 2024-11-10 PROCEDURE — 99284 EMERGENCY DEPT VISIT MOD MDM: CPT

## 2024-11-10 RX ORDER — CYCLOBENZAPRINE HCL 10 MG
10 TABLET ORAL ONCE
Status: COMPLETED | OUTPATIENT
Start: 2024-11-10 | End: 2024-11-10

## 2024-11-10 RX ORDER — HYDROXYZINE PAMOATE 25 MG/1
25 CAPSULE ORAL ONCE
Status: COMPLETED | OUTPATIENT
Start: 2024-11-10 | End: 2024-11-10

## 2024-11-10 RX ORDER — MORPHINE SULFATE 4 MG/ML
4 INJECTION, SOLUTION INTRAMUSCULAR; INTRAVENOUS ONCE
Status: DISCONTINUED | OUTPATIENT
Start: 2024-11-10 | End: 2024-11-11 | Stop reason: HOSPADM

## 2024-11-10 RX ORDER — ACETAMINOPHEN 500 MG
1000 TABLET ORAL ONCE
Status: COMPLETED | OUTPATIENT
Start: 2024-11-10 | End: 2024-11-10

## 2024-11-10 RX ORDER — CYCLOBENZAPRINE HCL 10 MG
10 TABLET ORAL 3 TIMES DAILY PRN
Qty: 9 TABLET | Refills: 0 | Status: SHIPPED | OUTPATIENT
Start: 2024-11-10 | End: 2024-11-13

## 2024-11-10 RX ADMIN — CYCLOBENZAPRINE 10 MG: 10 TABLET, FILM COATED ORAL at 22:56

## 2024-11-10 RX ADMIN — ACETAMINOPHEN 1000 MG: 500 TABLET ORAL at 21:38

## 2024-11-10 RX ADMIN — HYDROXYZINE PAMOATE 25 MG: 25 CAPSULE ORAL at 21:38

## 2024-11-10 ASSESSMENT — PAIN SCALES - GENERAL
PAINLEVEL_OUTOF10: 10

## 2024-11-10 ASSESSMENT — PAIN DESCRIPTION - LOCATION: LOCATION: BACK;KNEE

## 2024-11-10 ASSESSMENT — PAIN - FUNCTIONAL ASSESSMENT
PAIN_FUNCTIONAL_ASSESSMENT: 0-10
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
## 2024-11-11 NOTE — DISCHARGE INSTRUCTIONS
Your workup today thankfully show no new abnormalities  You may take ibuprofen/Tylenol as needed for pain and apply ice over the affected area of your body  If having increasing shortness of breath or worsening headache or loss of vision or weakness in the extremities please return to ER for reeval

## 2024-11-11 NOTE — ED PROVIDER NOTES
Findings suggest a low risk of cardiac events.   Perfusion Comments: LV perfusion is normal.   Perfusion Conclusion: There is no evidence of transient ischemic dilation (TID).   Image quality is good.    Hypertension     Migraine     UTI (urinary tract infection)      Past Surgical History:   Procedure Laterality Date    BACK SURGERY  08/15/2019    [facility] Dr Montero: Discectomy/Arthroplasty C4-C6    BICEPS TENDON REPAIR Right 10/3/2023    RIGHT BICEPS TENODESIS performed by Braeden Gillespie DO at [facility] OR    SHOULDER ARTHROSCOPY Right 10/3/2023    RIGHT SHOULDER ARTHROSCOPY WITH DEBRIDEMENT, SUBACROMIAL DECOMPRESSION, DISTAL CLAVICLE EXCISION, LOOSE BODY REMOVAL performed by Braeden Gillespie DO at [facility] OR    TUBAL LIGATION       No family history on file.  Social History     Socioeconomic History    Marital status:      Spouse name: Not on file    Number of children: Not on file    Years of education: Not on file    Highest education level: Not on file   Occupational History    Not on file   Tobacco Use    Smoking status: Never    Smokeless tobacco: Never   Vaping Use    Vaping status: Never Used   Substance and Sexual Activity    Alcohol use: Not Currently     Comment: occasionally// caffeine none since 09/03/24    Drug use: No    Sexual activity: Not on file   Other Topics Concern    Not on file   Social History Narrative    Not on file     Social Determinants of Health     Financial Resource Strain: Not on file   Food Insecurity: No Food Insecurity (9/3/2024)    Hunger Vital Sign     Worried About Running Out of Food in the Last Year: Never true     Ran Out of Food in the Last Year: Never true   Transportation Needs: No Transportation Needs (9/3/2024)    PRAPARE - Transportation     Lack of Transportation (Medical): No     Lack of Transportation (Non-Medical): No   Physical Activity: Not on file   Stress: Not on file   Social Connections: Not on file   Intimate Partner Violence: Not on file

## 2025-01-15 ENCOUNTER — CLINICAL DOCUMENTATION (OUTPATIENT)
Dept: ONCOLOGY | Age: 45
End: 2025-01-15

## 2025-01-15 ENCOUNTER — HOSPITAL ENCOUNTER (OUTPATIENT)
Dept: INFUSION THERAPY | Age: 45
Discharge: HOME | End: 2025-01-15
Payer: COMMERCIAL

## 2025-01-15 DIAGNOSIS — Z86.718 HX OF DEEP VENOUS THROMBOSIS: Primary | ICD-10-CM

## 2025-01-15 DIAGNOSIS — I82.413 DVT OF DEEP FEMORAL VEIN, BILATERAL (HCC): ICD-10-CM

## 2025-01-15 LAB
BASOPHILS # BLD: 0.02 K/UL
BASOPHILS NFR BLD: 0 % (ref 0–1)
EOSINOPHIL # BLD: 0.17 K/UL
EOSINOPHILS RELATIVE PERCENT: 3 % (ref 0–3)
ERYTHROCYTE [DISTWIDTH] IN BLOOD BY AUTOMATED COUNT: 13.7 % (ref 11.7–14.9)
HCT VFR BLD AUTO: 39.3 % (ref 37–47)
HCYS SERPL-SCNC: 10.2 UMOL/L
HGB BLD-MCNC: 12.9 G/DL (ref 12.5–16)
LYMPHOCYTES NFR BLD: 2.29 K/UL
LYMPHOCYTES RELATIVE PERCENT: 36 % (ref 24–44)
MCH RBC QN AUTO: 28.9 PG (ref 27–31)
MCHC RBC AUTO-ENTMCNC: 32.8 G/DL (ref 32–36)
MCV RBC AUTO: 87.9 FL (ref 78–100)
MONOCYTES NFR BLD: 0.6 K/UL
MONOCYTES NFR BLD: 10 % (ref 0–4)
NEUTROPHILS NFR BLD: 51 % (ref 36–66)
NEUTS SEG NFR BLD: 3.24 K/UL
PLATELET # BLD AUTO: 284 K/UL (ref 140–440)
PMV BLD AUTO: 9.8 FL (ref 7.5–11.1)
RBC # BLD AUTO: 4.47 M/UL (ref 4.2–5.4)
WBC OTHER # BLD: 6.3 K/UL (ref 4–10.5)

## 2025-01-15 PROCEDURE — 85025 COMPLETE CBC W/AUTO DIFF WBC: CPT

## 2025-01-15 PROCEDURE — 83090 ASSAY OF HOMOCYSTEINE: CPT

## 2025-01-15 PROCEDURE — 36415 COLL VENOUS BLD VENIPUNCTURE: CPT

## 2025-01-15 PROCEDURE — 86146 BETA-2 GLYCOPROTEIN ANTIBODY: CPT

## 2025-01-15 NOTE — PROGRESS NOTES
1/15/25 - while in the clinic pt was notified of the stat US today, 1/15/25 at [facility] arrival time of 1:30 pm

## 2025-01-16 ENCOUNTER — HOSPITAL ENCOUNTER (OUTPATIENT)
Dept: ULTRASOUND IMAGING | Age: 45
Discharge: HOME | End: 2025-01-16
Attending: INTERNAL MEDICINE
Payer: COMMERCIAL

## 2025-01-16 ENCOUNTER — TELEPHONE (OUTPATIENT)
Dept: CARDIOLOGY CLINIC | Age: 45
End: 2025-01-16

## 2025-01-16 DIAGNOSIS — Z86.718 HX OF DEEP VENOUS THROMBOSIS: ICD-10-CM

## 2025-01-16 PROCEDURE — 93970 EXTREMITY STUDY: CPT

## 2025-01-16 NOTE — TELEPHONE ENCOUNTER
Cardiologist: Dr. Vo  Surgeon: Dr. [facility]  Surgery: Extractions  Surgery/Procedure should be done in the hospital setting    _____ yes    _____  no    Anesthesia: Local  Date: Pending  Fax# [phone]  # [phone]    Last OV 10/14/2024 w/Gilda    DVT: Patient apparently has unprovoked DVTs in multiple planes possibility of the problem secondary to COVID infection cannot be excluded she is being evaluated by Dr. Smith.  She is now referred for evaluation to rule out any venous reflux problem due to post thrombotic syndrome.     PALPITATIONS: Patient is complaining of palpitations once again and COVID is known to cause possibility of rhythm problems.   9/19/2024  This is a 3-day event monitor showing that the predominant rhythm is sinus rhythm the maximum heart rate is 157 and the minimum is 64 with an average of 97 infrequent APCs and PVCs are seen  Suggest outpatient visit to discuss sinus tachycardia     HYPERTENSION: She is on Hydro Diuril for blood pressure control and her blood pressure seems to be under good control.      Last EKG- 9/19/2024      NM- 9/3/2024  Normal Exercise Stress MPI  Motion artifact noted during stress part  No inducible ischemia appreciated otherwise        Echo- 9/3/2024    Image quality is fair.    Left Ventricle: Normal left ventricular systolic function with a visually estimated EF of 55 - 60%. Left ventricle size is normal. Normal wall motion.    No significant valvular disease noted.    Pericardium: No pericardial effusion.      Eliquis

## 2025-01-20 LAB
B2 GLYCOPROT1 IGA SERPL IA-ACNC: 8.4 ELISA U/ML
B2 GLYCOPROT1 IGG SERPL IA-ACNC: 1.9 ELISA U/ML
B2 GLYCOPROT1 IGM SERPL IA-ACNC: 8.7 ELISA U/ML

## 2025-01-22 ENCOUNTER — OFFICE VISIT (OUTPATIENT)
Dept: ONCOLOGY | Age: 45
End: 2025-01-22

## 2025-01-22 ENCOUNTER — HOSPITAL ENCOUNTER (OUTPATIENT)
Dept: INFUSION THERAPY | Age: 45
Discharge: HOME | End: 2025-01-22
Payer: COMMERCIAL

## 2025-01-22 VITALS
HEART RATE: 70 BPM | SYSTOLIC BLOOD PRESSURE: 109 MMHG | WEIGHT: 204 LBS | OXYGEN SATURATION: 100 % | HEIGHT: 60 IN | TEMPERATURE: 97.5 F | DIASTOLIC BLOOD PRESSURE: 79 MMHG | RESPIRATION RATE: 16 BRPM | BODY MASS INDEX: 40.05 KG/M2

## 2025-01-22 DIAGNOSIS — R79.89 ELEVATED HOMOCYSTEINE: Primary | ICD-10-CM

## 2025-01-22 PROCEDURE — 99211 OFF/OP EST MAY X REQ PHY/QHP: CPT

## 2025-01-22 RX ORDER — CYANOCOBALAMIN/FOLIC AC/VIT B6 2-2.5-25MG
1 TABLET ORAL DAILY
Qty: 30 TABLET | Refills: 5 | Status: SHIPPED | OUTPATIENT
Start: 2025-01-22

## 2025-01-22 NOTE — PROGRESS NOTES
MA Rooming Questions  Patient: Abimbola Morales  MRN: [id number]    Date: 1/22/2025        1. Do you have any new issues?   yes - ongoing leg pain x both legs         2. Do you need any refills on medications?    yes - FOLIC ACID    3. Have you had any imaging done since your last visit?   yes - doppler 1/16    4. Have you been hospitalized or seen in the emergency room since your last visit here?   no    5. Did the patient have a depression screening completed today? No    No data recorded     PHQ-9 Given to (if applicable):               PHQ-9 Score (if applicable):                     [] Positive     []  Negative              Does question #9 need addressed (if applicable)                     [] Yes    []  No               Keke Esquivel MA      
36 01/15/2025    MONOPCT 10 (H) 01/15/2025    BANDABS 0.16 04/13/2019    EOSABS 0.17 01/15/2025    BASOSABS 0.02 01/15/2025    LYMPHSABS 2.29 01/15/2025    MONOSABS 0.60 01/15/2025    DIFFTYPE AUTOMATED DIFFERENTIAL 09/02/2024    PLTM RARE GIANT PLATELET SEEN ON SMEAR 04/13/2019     Chemistry:  Lab Results   Component Value Date     09/02/2024    K 3.6 09/02/2024     09/02/2024    CO2 25 09/02/2024    BUN 16 09/02/2024    CREATININE 0.8 09/02/2024    GLUCOSE 84 09/02/2024    CALCIUM 9.0 09/02/2024    BILITOT 0.2 09/02/2024    ALKPHOS 144 (H) 09/02/2024    AST 22 09/02/2024    ALT 24 09/02/2024    LABGLOM >90 09/02/2024    GFRAA >60 09/29/2021    MG 2.0 09/29/2021    POCGLU 102 (H) 08/30/2016     Lab Results   Component Value Date    HOMOCYSTEINE 10.2 01/15/2025     No results found for: \"LD\"  Lab Results   Component Value Date    TSHHS 1.540 09/03/2024     Coagulation Panel:  Lab Results   Component Value Date    PROTIME 13.2 (H) 04/13/2019    INR 1.16 04/13/2019    APTT 27.5 08/30/2016    DDIMER 0.67 (H) 09/03/2024      Observations:  No data recorded     Assessment & Plan:    1. She was referred for bilateral LE DVT.  She had COVID infection 7 months ago and in 8/2024  9/3/2024 went to ED with atypical chest pain. Found to have b/l DVT.  which could be related to COVID infection.  9/3/2024 AT III wnl. No prothrombin and FV Leiden mutation. Protein S activity wnl. Protein C activity 186H.  Anti cardiolipin IgG and IgM wnl.  Recommended compression stocking and referred to cardiologist for Venous reflux study.  Currently on Eliquis. 3 mo AC at least recommended.  She is otherwise active.    9/17/2024 no lupus anticoagulant detected.   B2GP IgM equivocal at 9.4. Protein C functional wnl. Prot S activity wnl.   She stopped DOAC in early 12/2024.   1/15/2025 homocysteine 10.2. CBC wnl. Refill of westab.  B2GP IgG 2 wnl. B2GP IgM 8.7, equivocal.  Clinically I doubt antiphospholipid sx.   1/16/2025 VL

## 2025-01-22 NOTE — PROGRESS NOTES
Patient Name: Abimbola Morales  : 1980  MRN# [id number]    REASON FOR VISIT: Cardiac Clearance  Patient Active Problem List    Diagnosis Date Noted    Palpitations 2024    DVT (deep venous thrombosis) (HCC) 2024    Varicose veins of both legs with edema 2024    Chest pain at rest 2024    Elevated d-dimer 2024    S/P arthroscopy of right shoulder 2024    Superior labrum anterior-to-posterior (SLAP) tear of right shoulder 2023    Vision changes 2019    Headache 2016    Left-sided weakness 2016    Slurred speech 2016    Less than 8 weeks gestation of pregnancy 2016     CURRENT SX:     Chest Pain :    When did it begin:    How long does it last:    How severe 1-10:    Radiation:    Aggravating factors:    Relieving factors:    Associated features:    Tenderness to palp:    Shortness of Breath:    When did it start:    How many flights of stairs can they climb without SOB:    Associated features:    Orthopena; how many pillows do they sleep with:    Dizziness    Palpitations:    When did it begin:    How often do they occur:    How long do they last:    Aggravating factors:    Relieving factors:    Associated features:    Any thyroid issues:    How much caffeine:    Edema    Do you Exercise??    LABS:  Lab Results   Component Value Date    CHOL 184 2024    TRIG 68 2024    HDL 50 2024    LDLDIRECT 101 (H) 2016     Hemoglobin A1C   Date Value Ref Range Status   2024 4.8 4.2 - 6.3 % Final

## 2025-01-31 ENCOUNTER — OFFICE VISIT (OUTPATIENT)
Dept: CARDIOLOGY CLINIC | Age: 45
End: 2025-01-31

## 2025-01-31 VITALS
SYSTOLIC BLOOD PRESSURE: 110 MMHG | BODY MASS INDEX: 40.76 KG/M2 | OXYGEN SATURATION: 98 % | DIASTOLIC BLOOD PRESSURE: 70 MMHG | HEART RATE: 63 BPM | HEIGHT: 60 IN | WEIGHT: 207.6 LBS

## 2025-01-31 DIAGNOSIS — I82.493 ACUTE DEEP VEIN THROMBOSIS (DVT) OF OTHER SPECIFIED VEIN OF BOTH LOWER EXTREMITIES (HCC): ICD-10-CM

## 2025-01-31 DIAGNOSIS — R00.2 PALPITATIONS: Primary | ICD-10-CM

## 2025-01-31 DIAGNOSIS — I83.893 VARICOSE VEINS OF BOTH LEGS WITH EDEMA: ICD-10-CM

## 2025-01-31 RX ORDER — METOPROLOL SUCCINATE 25 MG/1
25 TABLET, EXTENDED RELEASE ORAL DAILY
Qty: 30 TABLET | Refills: 5 | Status: SHIPPED | OUTPATIENT
Start: 2025-01-31

## 2025-01-31 NOTE — PROGRESS NOTES
OFFICE PROGRESS NOTES      Abimbola is a 44 y.o. female who has    CHIEF COMPLAINT AS FOLLOWS:  CHEST PAIN: Patient denies any C/O chest pains at this time.      SOB: No C/O SOB at this time.          LEG EDEMA:complains of leg edema, restless legs, nighttime cramps, aching, weak and fatigued legs    PALPITATIONS: Denies any C/O Palpitations   DIZZINESS: No C/O Dizziness   SYNCOPE: None   OTHER/ ADDITIONAL COMPLAINTS:                                     HPI: Patient is here for F/U on his CVI, Tachy-Arrhythmia Arrhythmia & HTN problems.   CVI: Deep vein Reflux   Arrhythmia: Patient has known H/O sinus tachycardia.   HTN: Patient has known essential HTN. Has been treated with guideline recommended medical / physical/ diet therapy as stated below.  Dyslipidemia: Patient has known mixed dyslipidemia. Has been treated with guideline recommended medical / physical/ diet therapy as stated below.                Current Outpatient Medications   Medication Sig Dispense Refill    folic acid-pyridoxine-cyancobalamin 2.5-25-2 mg tablet (WESTAB MAX) 2.5-25-2 MG TABS Take 1 tablet by mouth daily 30 tablet 5    hydroCHLOROthiazide (HYDRODIURIL) 25 MG tablet Take 1 tablet by mouth daily 30 tablet 0    albuterol sulfate HFA (PROVENTIL HFA) 108 (90 Base) MCG/ACT inhaler Inhale 2 puffs into the lungs every 6 hours as needed for Wheezing or Shortness of Breath 18 g 0    Saccharomyces boulardii (PROBIOTIC) 250 MG CAPS Take by mouth      Multiple Vitamins-Minerals (THERAPEUTIC MULTIVITAMIN-MINERALS) tablet Take 1 tablet by mouth daily      metoprolol succinate (TOPROL XL) 25 MG extended release tablet Take 1 tablet by mouth daily (Patient not taking: Reported on 1/31/2025) 30 tablet 5    cyclobenzaprine (FLEXERIL) 10 MG tablet Take 1 tablet by mouth nightly (Patient not taking: Reported on 1/31/2025) 30 tablet 2    ondansetron (ZOFRAN-ODT) 4 MG disintegrating tablet Take 1 tablet by mouth 3 times daily as needed for Nausea or Vomiting

## 2025-02-04 NOTE — ED NOTES
2/4/2025      Emily Best  [address] [facility] [address]      Dear Emily Best,                                                                                                       FATMATA Sheffield wrote a referral for you to see a Behavioral Health provider. Our attempt to reach you by phone has been unsuccessful.     Please call the [facility] Scheduling Patient Line [phone]  at your earliest convenience. Let the  know that a behavioral health referral has been ordered and you are calling to discuss the referral.    We look forward to assisting you with your health care needs.    Sincerely,    [facility] Scheduling Patient Line [phone]        Pt upset at d/c due to pain.  Pt was medicated with Flexeril prior to d/c and helped in to wheelchair.  Pt continued to be upset at being d/c.  Dr. Tobar spoke with pt and pt was d/c to lobby in wheelchair with family.

## 2025-04-07 ENCOUNTER — OFFICE VISIT (OUTPATIENT)
Age: 45
End: 2025-04-07
Payer: COMMERCIAL

## 2025-04-07 VITALS
WEIGHT: 196.6 LBS | SYSTOLIC BLOOD PRESSURE: 106 MMHG | HEART RATE: 58 BPM | DIASTOLIC BLOOD PRESSURE: 62 MMHG | BODY MASS INDEX: 38.4 KG/M2 | OXYGEN SATURATION: 99 %

## 2025-04-07 DIAGNOSIS — Z82.0 FAMILY HISTORY OF NEUROMUSCULAR DYSFUNCTION: ICD-10-CM

## 2025-04-07 DIAGNOSIS — M54.12 CERVICAL RADICULOPATHY: Primary | ICD-10-CM

## 2025-04-07 DIAGNOSIS — Z82.69 FAMILY HISTORY OF NEUROMUSCULAR DYSFUNCTION: ICD-10-CM

## 2025-04-07 PROCEDURE — 99204 OFFICE O/P NEW MOD 45 MIN: CPT | Performed by: STUDENT IN AN ORGANIZED HEALTH CARE EDUCATION/TRAINING PROGRAM

## 2025-04-07 PROCEDURE — G8427 DOCREV CUR MEDS BY ELIG CLIN: HCPCS | Performed by: STUDENT IN AN ORGANIZED HEALTH CARE EDUCATION/TRAINING PROGRAM

## 2025-04-07 PROCEDURE — 1036F TOBACCO NON-USER: CPT | Performed by: STUDENT IN AN ORGANIZED HEALTH CARE EDUCATION/TRAINING PROGRAM

## 2025-04-07 PROCEDURE — G8417 CALC BMI ABV UP PARAM F/U: HCPCS | Performed by: STUDENT IN AN ORGANIZED HEALTH CARE EDUCATION/TRAINING PROGRAM

## 2025-04-07 PROCEDURE — 99205 OFFICE O/P NEW HI 60 MIN: CPT | Performed by: STUDENT IN AN ORGANIZED HEALTH CARE EDUCATION/TRAINING PROGRAM

## 2025-04-07 RX ORDER — CYCLOBENZAPRINE HCL 10 MG
10 TABLET ORAL NIGHTLY
Qty: 30 TABLET | Refills: 2 | Status: SHIPPED | OUTPATIENT
Start: 2025-04-07

## 2025-04-07 NOTE — PROGRESS NOTES
neuropathy or primary muscle disease.    Above images and reports personally reviewed in detail.   ASSESSMENT/PLAN:       ICD-10-CM    1. Cervical radiculopathy  M54.12 MRI CERVICAL SPINE WO CONTRAST     EMG NCV (11-12 NERVE CONDUCTION STUDIES)     cyclobenzaprine (FLEXERIL) 10 MG tablet      2. Family history of neuromuscular dysfunction  Z82.69 Amb External Referral To Counseling Services    Z82.0            44 y.o. -female presenting with brief recurrent episodes of shoulder girdle muscular fatigue, and frequent episodes of radicular neuropathic pain radiating into the RUE persistent since undergoing surgery to address cervical myelopathy with radiculopathy.  Signs and symptoms consistent with persistent multilevel cervical radiculopathy mostly affecting the RUE.  She does have some concern that she may have spinal muscular atrophy as well, as her daughter had some recent abnormal genetic testing, and wants this ruled out as well. I find no upper motor neuron signs on her exam to suggest persistent nor recurrent myelopathy, however we did discuss that sometimes these symptoms do not recover following cord compression.      Genetic counseling referral to rule out SMA  EMG of the bilateral upper extremities.  Next will plan to obtain MRI cervical spine.   She had steroid injections in her neck which were briefly helpful before surgery, and is agreeable to reconsidering this again in the future if needed.    Neurodiagnostics as above    We will plan to see the patient back in 6 weeks, with VICK.     60 min were spent on this encounter including chart review, reviewing prior imaging and lab work, in interview and exam of patient, and in documentation.    (Please note that portions of this note were completed with a voice recognition program.  Efforts were made to edit the dictations but occasionally words are mistranscribed.)      Allie Manuel DO, 4/7/2025  
no neck stiffness, no meningmus, + tenderness to left maxillary sinus

## 2025-04-08 ENCOUNTER — TELEPHONE (OUTPATIENT)
Dept: ORTHOPEDIC SURGERY | Age: 45
End: 2025-04-08

## 2025-04-09 ENCOUNTER — TELEPHONE (OUTPATIENT)
Dept: CARDIOLOGY CLINIC | Age: 45
End: 2025-04-09

## 2025-04-09 NOTE — TELEPHONE ENCOUNTER
Shila with [facility] called to check   On cardiac clearance , Moriah advised it will be sent today she voiced   Under standing.

## 2025-04-23 ENCOUNTER — HOSPITAL ENCOUNTER (EMERGENCY)
Age: 45
Discharge: LEFT BEFORE DISCHARGE | End: 2025-04-23
Attending: EMERGENCY MEDICINE
Payer: COMMERCIAL

## 2025-04-23 ENCOUNTER — APPOINTMENT (OUTPATIENT)
Dept: ULTRASOUND IMAGING | Age: 45
End: 2025-04-23
Payer: COMMERCIAL

## 2025-04-23 VITALS
RESPIRATION RATE: 16 BRPM | WEIGHT: 180 LBS | BODY MASS INDEX: 35.34 KG/M2 | TEMPERATURE: 97.2 F | HEIGHT: 60 IN | SYSTOLIC BLOOD PRESSURE: 120 MMHG | HEART RATE: 74 BPM | OXYGEN SATURATION: 100 % | DIASTOLIC BLOOD PRESSURE: 76 MMHG

## 2025-04-23 DIAGNOSIS — N93.9 VAGINAL BLEEDING: ICD-10-CM

## 2025-04-23 DIAGNOSIS — N92.6 IRREGULAR MENSES: Primary | ICD-10-CM

## 2025-04-23 DIAGNOSIS — Z20.2 POSSIBLE EXPOSURE TO STD: ICD-10-CM

## 2025-04-23 LAB
ALBUMIN SERPL-MCNC: 4.2 G/DL (ref 3.4–5)
ALBUMIN/GLOB SERPL: 1.2 {RATIO} (ref 1.1–2.2)
ALP SERPL-CCNC: 100 U/L (ref 40–129)
ALT SERPL-CCNC: 20 U/L (ref 10–40)
ANION GAP SERPL CALCULATED.3IONS-SCNC: 13 MMOL/L (ref 9–17)
AST SERPL-CCNC: 25 U/L (ref 15–37)
BACTERIA URNS QL MICRO: ABNORMAL
BASOPHILS # BLD: 0.05 K/UL
BASOPHILS NFR BLD: 1 % (ref 0–1)
BILIRUB SERPL-MCNC: 0.5 MG/DL (ref 0–1)
BILIRUB UR QL STRIP: ABNORMAL
BUN SERPL-MCNC: 17 MG/DL (ref 7–20)
CALCIUM SERPL-MCNC: 9.7 MG/DL (ref 8.3–10.6)
CHLORIDE SERPL-SCNC: 107 MMOL/L (ref 99–110)
CLARITY UR: CLEAR
CLUE CELLS VAG QL WET PREP: NORMAL
CO2 SERPL-SCNC: 21 MMOL/L (ref 21–32)
COLOR UR: YELLOW
CREAT SERPL-MCNC: 0.9 MG/DL (ref 0.6–1.1)
EOSINOPHIL # BLD: 0.11 K/UL
EOSINOPHILS RELATIVE PERCENT: 2 % (ref 0–3)
EPI CELLS #/AREA URNS HPF: 10 /HPF
ERYTHROCYTE [DISTWIDTH] IN BLOOD BY AUTOMATED COUNT: 13.3 % (ref 11.7–14.9)
GFR, ESTIMATED: 68 ML/MIN/1.73M2
GLUCOSE SERPL-MCNC: 90 MG/DL (ref 74–99)
GLUCOSE UR STRIP-MCNC: NEGATIVE MG/DL
HCG UR QL: NEGATIVE
HCT VFR BLD AUTO: 39.3 % (ref 37–47)
HGB BLD-MCNC: 12.7 G/DL (ref 12.5–16)
HGB UR QL STRIP.AUTO: ABNORMAL
IMM GRANULOCYTES # BLD AUTO: 0.01 K/UL
IMM GRANULOCYTES NFR BLD: 0 %
KETONES UR STRIP-MCNC: 15 MG/DL
LEUKOCYTE ESTERASE UR QL STRIP: NEGATIVE
LYMPHOCYTES NFR BLD: 1.99 K/UL
LYMPHOCYTES RELATIVE PERCENT: 34 % (ref 24–44)
MCH RBC QN AUTO: 28.9 PG (ref 27–31)
MCHC RBC AUTO-ENTMCNC: 32.3 G/DL (ref 32–36)
MCV RBC AUTO: 89.3 FL (ref 78–100)
MONOCYTES NFR BLD: 0.34 K/UL
MONOCYTES NFR BLD: 6 % (ref 0–5)
MUCOUS THREADS URNS QL MICRO: ABNORMAL
NEUTROPHILS NFR BLD: 57 % (ref 36–66)
NEUTS SEG NFR BLD: 3.36 K/UL
NITRITE UR QL STRIP: NEGATIVE
PH UR STRIP: 6 [PH] (ref 5–8)
PLATELET # BLD AUTO: 282 K/UL (ref 140–440)
PMV BLD AUTO: 10.4 FL (ref 7.5–11.1)
POTASSIUM SERPL-SCNC: 3.4 MMOL/L (ref 3.5–5.1)
PROT SERPL-MCNC: 7.8 G/DL (ref 6.4–8.2)
PROT UR STRIP-MCNC: 30 MG/DL
RBC # BLD AUTO: 4.4 M/UL (ref 4.2–5.4)
RBC #/AREA URNS HPF: 3 /HPF (ref 0–2)
SODIUM SERPL-SCNC: 141 MMOL/L (ref 136–145)
SOURCE WET PREP: NORMAL
SP GR UR STRIP: >1.03 (ref 1–1.03)
T VAGINALIS VAG QL WET PREP: NORMAL
UROBILINOGEN UR STRIP-ACNC: 0.2 EU/DL (ref 0–1)
WBC #/AREA URNS HPF: 1 /HPF (ref 0–5)
WBC OTHER # BLD: 5.9 K/UL (ref 4–10.5)
YEAST WET PREP: NORMAL

## 2025-04-23 PROCEDURE — 85025 COMPLETE CBC W/AUTO DIFF WBC: CPT

## 2025-04-23 PROCEDURE — 96372 THER/PROPH/DIAG INJ SC/IM: CPT

## 2025-04-23 PROCEDURE — 81001 URINALYSIS AUTO W/SCOPE: CPT

## 2025-04-23 PROCEDURE — 76856 US EXAM PELVIC COMPLETE: CPT

## 2025-04-23 PROCEDURE — 99284 EMERGENCY DEPT VISIT MOD MDM: CPT

## 2025-04-23 PROCEDURE — 87491 CHLMYD TRACH DNA AMP PROBE: CPT

## 2025-04-23 PROCEDURE — 93975 VASCULAR STUDY: CPT

## 2025-04-23 PROCEDURE — 6370000000 HC RX 637 (ALT 250 FOR IP): Performed by: EMERGENCY MEDICINE

## 2025-04-23 PROCEDURE — 87210 SMEAR WET MOUNT SALINE/INK: CPT

## 2025-04-23 PROCEDURE — 87591 N.GONORRHOEAE DNA AMP PROB: CPT

## 2025-04-23 PROCEDURE — 84703 CHORIONIC GONADOTROPIN ASSAY: CPT

## 2025-04-23 PROCEDURE — 80053 COMPREHEN METABOLIC PANEL: CPT

## 2025-04-23 PROCEDURE — 6360000002 HC RX W HCPCS: Performed by: EMERGENCY MEDICINE

## 2025-04-23 RX ORDER — DOXYCYCLINE HYCLATE 100 MG
100 TABLET ORAL ONCE
Status: COMPLETED | OUTPATIENT
Start: 2025-04-23 | End: 2025-04-23

## 2025-04-23 RX ORDER — DOXYCYCLINE HYCLATE 100 MG
100 TABLET ORAL 2 TIMES DAILY
Qty: 20 TABLET | Refills: 0 | Status: SHIPPED | OUTPATIENT
Start: 2025-04-23 | End: 2025-05-03

## 2025-04-23 RX ADMIN — LIDOCAINE HYDROCHLORIDE 500 MG: 10 INJECTION, SOLUTION EPIDURAL; INFILTRATION; INTRACAUDAL; PERINEURAL at 12:19

## 2025-04-23 RX ADMIN — DOXYCYCLINE HYCLATE 100 MG: 100 TABLET, COATED ORAL at 12:19

## 2025-04-23 ASSESSMENT — PAIN DESCRIPTION - PAIN TYPE: TYPE: ACUTE PAIN

## 2025-04-23 ASSESSMENT — PAIN - FUNCTIONAL ASSESSMENT: PAIN_FUNCTIONAL_ASSESSMENT: 0-10

## 2025-04-23 ASSESSMENT — PAIN DESCRIPTION - FREQUENCY: FREQUENCY: CONTINUOUS

## 2025-04-23 ASSESSMENT — PAIN DESCRIPTION - LOCATION: LOCATION: ABDOMEN

## 2025-04-23 ASSESSMENT — PAIN DESCRIPTION - ORIENTATION: ORIENTATION: LOWER

## 2025-04-23 ASSESSMENT — PAIN DESCRIPTION - DESCRIPTORS: DESCRIPTORS: SHARP

## 2025-04-23 ASSESSMENT — PAIN SCALES - GENERAL: PAINLEVEL_OUTOF10: 7

## 2025-04-23 NOTE — ED PROVIDER NOTES
[facility]    Emergency Department Encounter      Patient: Abimbola Morales  MRN: [id number]  : 1980  Date of Evaluation: 2025  ED Provider:  Ilana Hopkins DO    Triage Chief Complaint:   Vaginal Bleeding    [place]:  Abimbola Morales is a 44 y.o. female who  has a past medical history of Anxiety, Headache(784.0), History of echocardiogram, History of stress test, Hypertension, Migraine, and UTI (urinary tract infection). and presents with   Irregular menses.  She normally has routine periods.  This time, her period came early, which is not typical for her.  She is also with a new sexual partner and would like to be tested and treated empirically for STDs.  She has had some grey-pink discharge.    ROS - see HPI, below listed is current ROS at time of my eval:   systems reviewed and negative except as above.     Past Medical History:   Diagnosis Date    Anxiety     Headache(784.0)     History of echocardiogram 2024      Image quality is fair.   Left Ventricle: Normal left ventricular systolic function with a visually estimated EF of 55 - 60%. Left ventricle size is normal. Normal wall motion.   No significant valvular disease noted.   Pericardium: No pericardial effusion.    History of stress test 2024      Stress Combined Conclusion: Normal treadmill myocardial perfusion study at 85% PHR. Findings suggest a low risk of cardiac events.   Perfusion Comments: LV perfusion is normal.   Perfusion Conclusion: There is no evidence of transient ischemic dilation (TID).   Image quality is good.    Hypertension     Migraine     UTI (urinary tract infection)      Past Surgical History:   Procedure Laterality Date    BACK SURGERY  08/15/2019    [place]- Dr Montero: Discectomy/Arthroplasty C4-C6    BICEPS TENDON REPAIR Right 10/3/2023    RIGHT BICEPS TENODESIS performed by Braeden Gillespie DO at [facility] OR    SHOULDER ARTHROSCOPY Right 10/3/2023    RIGHT SHOULDER ARTHROSCOPY WITH  Findings: Uterus measures 8.8 x 4.6 x 4.4 cm. Endometrial thickness of 8 mm is within normal limits for premenopausal female. Right ovary 3.0 x 2.5 x 2.0 cm contains a 1.2 x 0.8 x 1.0 cm anechoic cyst. Left  ovary measures 2.6 x 2.4 x 1.4 cm and contains a 1.0 x 0.7 x 0.5 cm anechoic cyst. Doppler flow in both ovaries. No pelvic free fluid IMPRESSION: 1.Unremarkable pelvic ultrasound for age  Dictated and Electronically Signed By: Nav Ruggiero MD 4/23/2025 13:07        Vascular duplex abdomen/pelvis complete  Result Date: 4/23/2025  Pelvic ultrasound PROCEDURE: US PELVIS COMPLETE INDICATION: pain, irregular menses DEMOGRAPHICS: 44 years old Female Technique: Sonographic images were obtained to the pelvis using transabdominal interrogation. Findings: Uterus measures 8.8 x 4.6 x 4.4 cm. Endometrial thickness of 8 mm is within normal limits for premenopausal female. Right ovary 3.0 x 2.5 x 2.0 cm contains a 1.2 x 0.8 x 1.0 cm anechoic cyst. Left  ovary measures 2.6 x 2.4 x 1.4 cm and contains a 1.0 x 0.7 x 0.5 cm anechoic cyst. Doppler flow in both ovaries. No pelvic free fluid IMPRESSION: 1.Unremarkable pelvic ultrasound for age  Dictated and Electronically Signed By: Nav Ruggiero MD 4/23/2025 13:07        Ordered and reviewed diagnostic studies      ED Course as of 04/27/25 2120 Wed Apr 23, 2025   1202 Pregnancy, Urine: NEGATIVE [CB]   1213 Trace gray-pink discharge, cervical os closed, no tenderness [CB]   1232 Potassium(!): 3.4 [CB]   1233 Clue Cells, Wet Prep: None Seen [CB]   1233 Yeast, Wet Prep: None Seen [CB]   1246 U/s pending [CB]   1417 No pelvic free fluid  IMPRESSION:  1.Unremarkable pelvic ultrasound for age   [CB]      ED Course User Index  [CB] Ilana Hopkins DO       Patient was given the following medications:  Medications   doxycycline hyclate (VIBRA-TABS) tablet 100 mg (100 mg Oral Given 4/23/25 1219)   cefTRIAXone (ROCEPHIN) 500 mg in lidocaine 1 % 1.43 mL IM Injection (500 mg

## 2025-04-24 LAB
CHLAMYDIA TRACHOMATIS MOLECULAR: ABNORMAL
NEISSERIA GONORRHOEAE MOLECULAR: ABNORMAL
SOURCE: ABNORMAL
TRICHOMONAS VAGINALIS, MOLECULAR: ABNORMAL

## 2025-04-25 ENCOUNTER — RESULTS FOLLOW-UP (OUTPATIENT)
Dept: EMERGENCY DEPT | Age: 45
End: 2025-04-25

## 2025-04-28 PROCEDURE — 99283 EMERGENCY DEPT VISIT LOW MDM: CPT

## 2025-04-29 ENCOUNTER — HOSPITAL ENCOUNTER (EMERGENCY)
Age: 45
Discharge: HOME | End: 2025-04-29
Attending: EMERGENCY MEDICINE
Payer: COMMERCIAL

## 2025-04-29 ENCOUNTER — RESULTS FOLLOW-UP (OUTPATIENT)
Dept: EMERGENCY DEPT | Age: 45
End: 2025-04-29

## 2025-04-29 VITALS
OXYGEN SATURATION: 98 % | SYSTOLIC BLOOD PRESSURE: 126 MMHG | TEMPERATURE: 98.8 F | RESPIRATION RATE: 18 BRPM | HEART RATE: 92 BPM | DIASTOLIC BLOOD PRESSURE: 94 MMHG

## 2025-04-29 DIAGNOSIS — R10.2 VAGINAL PAIN: Primary | ICD-10-CM

## 2025-04-29 LAB
ALBUMIN SERPL-MCNC: 4.2 G/DL (ref 3.4–5)
ALBUMIN/GLOB SERPL: 1.4 {RATIO} (ref 1.1–2.2)
ALP SERPL-CCNC: 100 U/L (ref 40–129)
ALT SERPL-CCNC: 22 U/L (ref 10–40)
ANION GAP SERPL CALCULATED.3IONS-SCNC: 13 MMOL/L (ref 9–17)
AST SERPL-CCNC: 30 U/L (ref 15–37)
BACTERIA URNS QL MICRO: ABNORMAL
BASOPHILS # BLD: 0.05 K/UL
BASOPHILS NFR BLD: 1 % (ref 0–1)
BILIRUB SERPL-MCNC: 0.5 MG/DL (ref 0–1)
BILIRUB UR QL STRIP: NEGATIVE
BUN SERPL-MCNC: 15 MG/DL (ref 7–20)
CALCIUM SERPL-MCNC: 9.4 MG/DL (ref 8.3–10.6)
CHARACTER UR: ABNORMAL
CHLAMYDIA TRACHOMATIS MOLECULAR: NOT DETECTED
CHLORIDE SERPL-SCNC: 105 MMOL/L (ref 99–110)
CLARITY UR: CLEAR
CO2 SERPL-SCNC: 22 MMOL/L (ref 21–32)
COLOR UR: YELLOW
CREAT SERPL-MCNC: 0.8 MG/DL (ref 0.6–1.1)
EOSINOPHIL # BLD: 0.13 K/UL
EOSINOPHILS RELATIVE PERCENT: 2 % (ref 0–3)
EPI CELLS #/AREA URNS HPF: 18 /HPF
ERYTHROCYTE [DISTWIDTH] IN BLOOD BY AUTOMATED COUNT: 13.2 % (ref 11.7–14.9)
GFR, ESTIMATED: 78 ML/MIN/1.73M2
GLUCOSE SERPL-MCNC: 91 MG/DL (ref 74–99)
GLUCOSE UR STRIP-MCNC: NEGATIVE MG/DL
HCT VFR BLD AUTO: 38.6 % (ref 37–47)
HGB BLD-MCNC: 12.4 G/DL (ref 12.5–16)
HGB UR QL STRIP.AUTO: NEGATIVE
IMM GRANULOCYTES # BLD AUTO: 0.03 K/UL
IMM GRANULOCYTES NFR BLD: 0 %
KETONES UR STRIP-MCNC: ABNORMAL MG/DL
LEUKOCYTE ESTERASE UR QL STRIP: NEGATIVE
LIPASE SERPL-CCNC: 21 U/L (ref 13–60)
LYMPHOCYTES NFR BLD: 2.9 K/UL
LYMPHOCYTES RELATIVE PERCENT: 35 % (ref 24–44)
MCH RBC QN AUTO: 28.6 PG (ref 27–31)
MCHC RBC AUTO-ENTMCNC: 32.1 G/DL (ref 32–36)
MCV RBC AUTO: 89.1 FL (ref 78–100)
MONOCYTES NFR BLD: 0.53 K/UL
MONOCYTES NFR BLD: 6 % (ref 0–5)
MUCOUS THREADS URNS QL MICRO: ABNORMAL
NEISSERIA GONORRHOEAE MOLECULAR: NOT DETECTED
NEUTROPHILS NFR BLD: 56 % (ref 36–66)
NEUTS SEG NFR BLD: 4.68 K/UL
NITRITE UR QL STRIP: NEGATIVE
PH UR STRIP: 6 [PH] (ref 5–8)
PLATELET # BLD AUTO: 280 K/UL (ref 140–440)
PMV BLD AUTO: 10.5 FL (ref 7.5–11.1)
POTASSIUM SERPL-SCNC: 4 MMOL/L (ref 3.5–5.1)
PROT SERPL-MCNC: 7.3 G/DL (ref 6.4–8.2)
PROT UR STRIP-MCNC: 30 MG/DL
RBC # BLD AUTO: 4.33 M/UL (ref 4.2–5.4)
RBC #/AREA URNS HPF: 2 /HPF (ref 0–2)
SODIUM SERPL-SCNC: 139 MMOL/L (ref 136–145)
SOURCE: NORMAL
SP GR UR STRIP: >1.03 (ref 1–1.03)
TRICHOMONAS VAGINALIS, MOLECULAR: NOT DETECTED
UROBILINOGEN UR STRIP-ACNC: 1 EU/DL (ref 0–1)
WBC #/AREA URNS HPF: 1 /HPF (ref 0–5)
WBC OTHER # BLD: 8.3 K/UL (ref 4–10.5)

## 2025-04-29 PROCEDURE — 83690 ASSAY OF LIPASE: CPT

## 2025-04-29 PROCEDURE — 80053 COMPREHEN METABOLIC PANEL: CPT

## 2025-04-29 PROCEDURE — 85025 COMPLETE CBC W/AUTO DIFF WBC: CPT

## 2025-04-29 PROCEDURE — 87591 N.GONORRHOEAE DNA AMP PROB: CPT

## 2025-04-29 PROCEDURE — 87491 CHLMYD TRACH DNA AMP PROBE: CPT

## 2025-04-29 PROCEDURE — 81001 URINALYSIS AUTO W/SCOPE: CPT

## 2025-04-29 PROCEDURE — 6370000000 HC RX 637 (ALT 250 FOR IP): Performed by: EMERGENCY MEDICINE

## 2025-04-29 RX ORDER — ACETAMINOPHEN 325 MG/1
650 TABLET ORAL ONCE
Status: COMPLETED | OUTPATIENT
Start: 2025-04-29 | End: 2025-04-29

## 2025-04-29 RX ORDER — IBUPROFEN 600 MG/1
600 TABLET, FILM COATED ORAL ONCE
Status: COMPLETED | OUTPATIENT
Start: 2025-04-29 | End: 2025-04-29

## 2025-04-29 RX ADMIN — ACETAMINOPHEN 650 MG: 325 TABLET ORAL at 01:04

## 2025-04-29 RX ADMIN — IBUPROFEN 600 MG: 600 TABLET, FILM COATED ORAL at 01:04

## 2025-04-29 ASSESSMENT — PAIN DESCRIPTION - DESCRIPTORS: DESCRIPTORS: ACHING

## 2025-04-29 ASSESSMENT — PAIN DESCRIPTION - LOCATION: LOCATION: ABDOMEN

## 2025-04-29 ASSESSMENT — PAIN SCALES - GENERAL: PAINLEVEL_OUTOF10: 9

## 2025-04-29 NOTE — ED NOTES
Reviewed AVS with pt. Pt verbalizes understanding of AVS.   Pt expressed no other concerns or needs at time of discharge.   Pt ambulatory out of ED in no apparent distress at time of discharge.

## 2025-04-29 NOTE — ED PROVIDER NOTES
Triage Chief Complaint:   Abdominal Pain (Lower abd pain, was seen on 4/23 for the same thing. )    [place]:  Abimbola Morales is a 44 y.o. female that presents with continued lower abdominal vaginal pain after she was seen 5 days ago for the same symptoms.  She was on her menstrual cycle at that time and was having some gray discharge as well that has resolved.  She was treated for possible STD with Rocephin and doxycycline.  She is compliant with her antibiotics.  States that she has mostly vaginal pain.  Denies any urinary symptoms.  Discharge has stopped.  No other acute complaints.  No reported fevers..    ROS:  At least 6 systems reviewed and otherwise acutely negative except as in the [place].    Past Medical History:   Diagnosis Date    Anxiety     Headache(784.0)     History of echocardiogram 09/03/2024      Image quality is fair.   Left Ventricle: Normal left ventricular systolic function with a visually estimated EF of 55 - 60%. Left ventricle size is normal. Normal wall motion.   No significant valvular disease noted.   Pericardium: No pericardial effusion.    History of stress test 09/03/2024      Stress Combined Conclusion: Normal treadmill myocardial perfusion study at 85% PHR. Findings suggest a low risk of cardiac events.   Perfusion Comments: LV perfusion is normal.   Perfusion Conclusion: There is no evidence of transient ischemic dilation (TID).   Image quality is good.    Hypertension     Migraine     UTI (urinary tract infection)      Past Surgical History:   Procedure Laterality Date    BACK SURGERY  08/15/2019    [place]- Dr Montero: Discectomy/Arthroplasty C4-C6    BICEPS TENDON REPAIR Right 10/3/2023    RIGHT BICEPS TENODESIS performed by Braeden Gillespie DO at [facility] OR    SHOULDER ARTHROSCOPY Right 10/3/2023    RIGHT SHOULDER ARTHROSCOPY WITH DEBRIDEMENT, SUBACROMIAL DECOMPRESSION, DISTAL CLAVICLE EXCISION, LOOSE BODY REMOVAL performed by Braeden Gillespie DO at [facility] OR    TUBAL LIGATION

## 2025-06-03 RX ORDER — CYANOCOBALAMIN/FOLIC AC/VIT B6 2-2.5-25MG
1 TABLET ORAL DAILY
Qty: 30 TABLET | Refills: 5 | Status: SHIPPED | OUTPATIENT
Start: 2025-06-03

## 2025-06-22 ENCOUNTER — APPOINTMENT (OUTPATIENT)
Dept: ULTRASOUND IMAGING | Age: 45
End: 2025-06-22

## 2025-06-22 ENCOUNTER — HOSPITAL ENCOUNTER (EMERGENCY)
Age: 45
Discharge: HOME | End: 2025-06-22

## 2025-06-22 VITALS
RESPIRATION RATE: 16 BRPM | TEMPERATURE: 98.1 F | SYSTOLIC BLOOD PRESSURE: 129 MMHG | WEIGHT: 176 LBS | OXYGEN SATURATION: 100 % | BODY MASS INDEX: 34.37 KG/M2 | DIASTOLIC BLOOD PRESSURE: 99 MMHG | HEART RATE: 61 BPM

## 2025-06-22 DIAGNOSIS — M79.651 BILATERAL THIGH PAIN: Primary | ICD-10-CM

## 2025-06-22 DIAGNOSIS — M79.652 BILATERAL THIGH PAIN: Primary | ICD-10-CM

## 2025-06-22 LAB
ALBUMIN SERPL-MCNC: 4.4 G/DL (ref 3.4–5)
ALBUMIN/GLOB SERPL: 1.2 {RATIO} (ref 1.1–2.2)
ALP SERPL-CCNC: 102 U/L (ref 40–129)
ALT SERPL-CCNC: 18 U/L (ref 10–40)
ANION GAP SERPL CALCULATED.3IONS-SCNC: 11 MMOL/L (ref 9–17)
AST SERPL-CCNC: 20 U/L (ref 15–37)
B-HCG SERPL EIA 3RD IS-ACNC: <1 MIU/ML
BASOPHILS # BLD: 0.05 K/UL
BASOPHILS NFR BLD: 1 % (ref 0–1)
BILIRUB SERPL-MCNC: 0.4 MG/DL (ref 0–1)
BUN SERPL-MCNC: 18 MG/DL (ref 7–20)
CALCIUM SERPL-MCNC: 9.5 MG/DL (ref 8.3–10.6)
CHLORIDE SERPL-SCNC: 105 MMOL/L (ref 99–110)
CO2 SERPL-SCNC: 23 MMOL/L (ref 21–32)
CREAT SERPL-MCNC: 0.9 MG/DL (ref 0.6–1.1)
EOSINOPHIL # BLD: 0.16 K/UL
EOSINOPHILS RELATIVE PERCENT: 3 % (ref 0–3)
ERYTHROCYTE [DISTWIDTH] IN BLOOD BY AUTOMATED COUNT: 13.6 % (ref 11.7–14.9)
GFR, ESTIMATED: 73 ML/MIN/1.73M2
GLUCOSE SERPL-MCNC: 85 MG/DL (ref 74–99)
HCT VFR BLD AUTO: 42.3 % (ref 37–47)
HGB BLD-MCNC: 13.4 G/DL (ref 12.5–16)
IMM GRANULOCYTES # BLD AUTO: 0.01 K/UL
IMM GRANULOCYTES NFR BLD: 0 %
LYMPHOCYTES NFR BLD: 2.49 K/UL
LYMPHOCYTES RELATIVE PERCENT: 38 % (ref 24–44)
MCH RBC QN AUTO: 28.3 PG (ref 27–31)
MCHC RBC AUTO-ENTMCNC: 31.7 G/DL (ref 32–36)
MCV RBC AUTO: 89.2 FL (ref 78–100)
MONOCYTES NFR BLD: 0.39 K/UL
MONOCYTES NFR BLD: 6 % (ref 0–5)
NEUTROPHILS NFR BLD: 52 % (ref 36–66)
NEUTS SEG NFR BLD: 3.39 K/UL
PLATELET # BLD AUTO: 286 K/UL (ref 140–440)
PMV BLD AUTO: 10.5 FL (ref 7.5–11.1)
POTASSIUM SERPL-SCNC: 4 MMOL/L (ref 3.5–5.1)
PROT SERPL-MCNC: 8 G/DL (ref 6.4–8.2)
RBC # BLD AUTO: 4.74 M/UL (ref 4.2–5.4)
SODIUM SERPL-SCNC: 139 MMOL/L (ref 136–145)
WBC OTHER # BLD: 6.5 K/UL (ref 4–10.5)

## 2025-06-22 PROCEDURE — 85025 COMPLETE CBC W/AUTO DIFF WBC: CPT

## 2025-06-22 PROCEDURE — 80053 COMPREHEN METABOLIC PANEL: CPT

## 2025-06-22 PROCEDURE — 99284 EMERGENCY DEPT VISIT MOD MDM: CPT

## 2025-06-22 PROCEDURE — 93970 EXTREMITY STUDY: CPT

## 2025-06-22 PROCEDURE — 6370000000 HC RX 637 (ALT 250 FOR IP): Performed by: PHYSICIAN ASSISTANT

## 2025-06-22 PROCEDURE — 84702 CHORIONIC GONADOTROPIN TEST: CPT

## 2025-06-22 RX ORDER — CYCLOBENZAPRINE HCL 10 MG
10 TABLET ORAL 3 TIMES DAILY PRN
Qty: 21 TABLET | Refills: 0 | Status: SHIPPED | OUTPATIENT
Start: 2025-06-22 | End: 2025-07-02

## 2025-06-22 RX ORDER — DICLOFENAC SODIUM 75 MG/1
75 TABLET, DELAYED RELEASE ORAL 2 TIMES DAILY PRN
Qty: 20 TABLET | Refills: 0 | Status: SHIPPED | OUTPATIENT
Start: 2025-06-22

## 2025-06-22 RX ORDER — HYDROCODONE BITARTRATE AND ACETAMINOPHEN 5; 325 MG/1; MG/1
1 TABLET ORAL ONCE
Status: COMPLETED | OUTPATIENT
Start: 2025-06-22 | End: 2025-06-22

## 2025-06-22 RX ADMIN — HYDROCODONE BITARTRATE AND ACETAMINOPHEN 1 TABLET: 5; 325 TABLET ORAL at 11:38

## 2025-06-22 ASSESSMENT — PAIN SCALES - GENERAL
PAINLEVEL_OUTOF10: 8

## 2025-06-22 ASSESSMENT — PAIN DESCRIPTION - ORIENTATION
ORIENTATION: RIGHT;LEFT
ORIENTATION: RIGHT;LEFT

## 2025-06-22 ASSESSMENT — PAIN DESCRIPTION - DESCRIPTORS
DESCRIPTORS: SHARP
DESCRIPTORS: SHARP

## 2025-06-22 ASSESSMENT — PAIN DESCRIPTION - LOCATION
LOCATION: LEG
LOCATION: LEG

## 2025-06-22 ASSESSMENT — PAIN - FUNCTIONAL ASSESSMENT: PAIN_FUNCTIONAL_ASSESSMENT: 0-10

## 2025-06-22 NOTE — ED PROVIDER NOTES
[facility] EMERGENCY DEPARTMENT  EMERGENCY DEPARTMENT ENCOUNTER        Pt Name: Abimbola Morales  MRN: [id number]  Birthdate 1980  Date of evaluation: 6/22/2025  Provider: Josué Crespo PA-C  PCP: Milagros Santacruz MD  Note Started: 11:36 AM EDT 6/22/25      VICK. I have evaluated this patient.        CHIEF COMPLAINT       Chief Complaint   Patient presents with    Leg Pain     To L upper leg. Reports hx of DVT       HISTORY OF PRESENT ILLNESS: 1 or more Elements     History From: patient    Limitations to history : None    Social Determinants Significantly Affecting Health : Patient has significant healthcare illiteracy. Additional time provided in explanations.    Chief Complaint: bilateral thigh pain    Abimbola Morales is a 44 y.o. female with past medical history of DVT who presents complaining of bilateral thigh pain for 2 weeks.  Patient reports pain in bilateral upper thighs for the last 2 weeks, described as cramping and burning.  She states it feels similar to when she had a DVT in December.  She states she stopped Eliquis in February when she did ultrasounds and the DVTs were gone.  She thinks the DVT was caused by COVID vaccination maybe.  Denies any trauma, chest pain, shortness of breath, bleeding, fever, back pain, changes in bowel or bladder function, difficulty walking    Nursing Notes were all reviewed and agreed with or any disagreements were addressed in the HPI.    REVIEW OF SYSTEMS :      Review of Systems   All other systems reviewed and are negative.      Positives and Pertinent negatives as per HPI.     SURGICAL HISTORY     Past Surgical History:   Procedure Laterality Date    BACK SURGERY  08/15/2019    [place]- Dr Montero: Discectomy/Arthroplasty C4-C6    BICEPS TENDON REPAIR Right 10/3/2023    RIGHT BICEPS TENODESIS performed by Braeden Gillespie DO at [facility] OR    SHOULDER ARTHROSCOPY Right 10/3/2023    RIGHT SHOULDER ARTHROSCOPY WITH DEBRIDEMENT,

## 2025-06-22 NOTE — ED NOTES
Peripheral IV placed in the r forearm using a 20  gauge catheter.   Site flushed with normal saline, and no complications noted. Saline locked.   IV Dressing clean, dry, and intact.

## 2025-07-21 ENCOUNTER — TELEPHONE (OUTPATIENT)
Dept: ONCOLOGY | Age: 45
End: 2025-07-21

## 2025-07-21 DIAGNOSIS — R79.89 ELEVATED HOMOCYSTEINE: Primary | ICD-10-CM

## 2025-07-21 NOTE — TELEPHONE ENCOUNTER
Called patient to reschedule their No Show appointment on 7/21 with RIC ESTRELLA. Spoke with patient and rescheduled appointment to 7/29 with RIC ESTRELLA.

## 2025-09-02 RX ORDER — METOPROLOL SUCCINATE 25 MG/1
25 TABLET, EXTENDED RELEASE ORAL DAILY
Qty: 30 TABLET | Refills: 5 | Status: SHIPPED | OUTPATIENT
Start: 2025-09-02

## 2025-09-03 ENCOUNTER — HOSPITAL ENCOUNTER (EMERGENCY)
Age: 45
Discharge: HOME | End: 2025-09-03
Attending: EMERGENCY MEDICINE

## 2025-09-03 ENCOUNTER — APPOINTMENT (OUTPATIENT)
Dept: GENERAL RADIOLOGY | Age: 45
End: 2025-09-03

## 2025-09-03 VITALS
OXYGEN SATURATION: 100 % | RESPIRATION RATE: 21 BRPM | DIASTOLIC BLOOD PRESSURE: 89 MMHG | WEIGHT: 170 LBS | BODY MASS INDEX: 33.2 KG/M2 | HEART RATE: 63 BPM | TEMPERATURE: 97.5 F | SYSTOLIC BLOOD PRESSURE: 129 MMHG

## 2025-09-03 DIAGNOSIS — R07.9 CHEST PAIN, UNSPECIFIED TYPE: Primary | ICD-10-CM

## 2025-09-03 LAB
ALBUMIN SERPL-MCNC: 4.2 G/DL (ref 3.4–5)
ALBUMIN/GLOB SERPL: 1.3 {RATIO} (ref 1.1–2.2)
ALP SERPL-CCNC: 101 U/L (ref 40–129)
ALT SERPL-CCNC: 18 U/L (ref 10–40)
ANION GAP SERPL CALCULATED.3IONS-SCNC: 8 MMOL/L (ref 9–17)
AST SERPL-CCNC: 22 U/L (ref 15–37)
BASOPHILS # BLD: 0.04 K/UL
BASOPHILS NFR BLD: 1 % (ref 0–1)
BILIRUB SERPL-MCNC: 0.5 MG/DL (ref 0–1)
BNP SERPL-MCNC: 97 PG/ML (ref 0–125)
BUN SERPL-MCNC: 15 MG/DL (ref 7–20)
CALCIUM SERPL-MCNC: 9.4 MG/DL (ref 8.3–10.6)
CHLORIDE SERPL-SCNC: 108 MMOL/L (ref 99–110)
CO2 SERPL-SCNC: 23 MMOL/L (ref 21–32)
CREAT SERPL-MCNC: 0.9 MG/DL (ref 0.6–1.1)
D DIMER PPP FEU-MCNC: <0.27 UG/ML FEU (ref 0–0.46)
EOSINOPHIL # BLD: 0.13 K/UL
EOSINOPHILS RELATIVE PERCENT: 2 % (ref 0–3)
ERYTHROCYTE [DISTWIDTH] IN BLOOD BY AUTOMATED COUNT: 14 % (ref 11.7–14.9)
GFR, ESTIMATED: 69 ML/MIN/1.73M2
GLUCOSE SERPL-MCNC: 94 MG/DL (ref 74–99)
HCT VFR BLD AUTO: 39.5 % (ref 37–47)
HGB BLD-MCNC: 12.6 G/DL (ref 12.5–16)
IMM GRANULOCYTES # BLD AUTO: 0.02 K/UL
IMM GRANULOCYTES NFR BLD: 0 %
LYMPHOCYTES NFR BLD: 2.52 K/UL
LYMPHOCYTES RELATIVE PERCENT: 41 % (ref 24–44)
MCH RBC QN AUTO: 28.1 PG (ref 27–31)
MCHC RBC AUTO-ENTMCNC: 31.9 G/DL (ref 32–36)
MCV RBC AUTO: 88 FL (ref 78–100)
MONOCYTES NFR BLD: 0.42 K/UL
MONOCYTES NFR BLD: 7 % (ref 0–5)
NEUTROPHILS NFR BLD: 50 % (ref 36–66)
NEUTS SEG NFR BLD: 3.08 K/UL
PLATELET # BLD AUTO: 262 K/UL (ref 140–440)
PMV BLD AUTO: 10.6 FL (ref 7.5–11.1)
POTASSIUM SERPL-SCNC: 4.2 MMOL/L (ref 3.5–5.1)
PROT SERPL-MCNC: 7.3 G/DL (ref 6.4–8.2)
RBC # BLD AUTO: 4.49 M/UL (ref 4.2–5.4)
SODIUM SERPL-SCNC: 139 MMOL/L (ref 136–145)
TROPONIN I SERPL HS-MCNC: 7 NG/L (ref 0–14)
TROPONIN I SERPL HS-MCNC: <6 NG/L (ref 0–14)
WBC OTHER # BLD: 6.2 K/UL (ref 4–10.5)

## 2025-09-03 PROCEDURE — 80053 COMPREHEN METABOLIC PANEL: CPT

## 2025-09-03 PROCEDURE — 71045 X-RAY EXAM CHEST 1 VIEW: CPT

## 2025-09-03 PROCEDURE — 85025 COMPLETE CBC W/AUTO DIFF WBC: CPT

## 2025-09-03 PROCEDURE — 83880 ASSAY OF NATRIURETIC PEPTIDE: CPT

## 2025-09-03 PROCEDURE — 93005 ELECTROCARDIOGRAM TRACING: CPT | Performed by: EMERGENCY MEDICINE

## 2025-09-03 PROCEDURE — 99285 EMERGENCY DEPT VISIT HI MDM: CPT

## 2025-09-03 PROCEDURE — 85379 FIBRIN DEGRADATION QUANT: CPT

## 2025-09-03 PROCEDURE — 84484 ASSAY OF TROPONIN QUANT: CPT

## 2025-09-03 PROCEDURE — 6370000000 HC RX 637 (ALT 250 FOR IP): Performed by: EMERGENCY MEDICINE

## 2025-09-03 RX ORDER — ASPIRIN 81 MG/1
324 TABLET, CHEWABLE ORAL ONCE
Status: COMPLETED | OUTPATIENT
Start: 2025-09-03 | End: 2025-09-03

## 2025-09-03 RX ORDER — ACETAMINOPHEN 500 MG
1000 TABLET ORAL ONCE
Status: COMPLETED | OUTPATIENT
Start: 2025-09-03 | End: 2025-09-03

## 2025-09-03 RX ORDER — NITROGLYCERIN 0.4 MG/1
0.4 TABLET SUBLINGUAL ONCE
Status: COMPLETED | OUTPATIENT
Start: 2025-09-03 | End: 2025-09-03

## 2025-09-03 RX ORDER — 0.9 % SODIUM CHLORIDE 0.9 %
1000 INTRAVENOUS SOLUTION INTRAVENOUS ONCE
Status: DISCONTINUED | OUTPATIENT
Start: 2025-09-03 | End: 2025-09-03 | Stop reason: HOSPADM

## 2025-09-03 RX ORDER — HYDROCHLOROTHIAZIDE 25 MG/1
25 TABLET ORAL DAILY
Qty: 30 TABLET | Refills: 1 | Status: SHIPPED | OUTPATIENT
Start: 2025-09-03

## 2025-09-03 RX ADMIN — NITROGLYCERIN 0.4 MG: 0.4 TABLET SUBLINGUAL at 08:24

## 2025-09-03 RX ADMIN — ASPIRIN 81 MG 324 MG: 81 TABLET ORAL at 08:23

## 2025-09-03 RX ADMIN — ACETAMINOPHEN 1000 MG: 500 TABLET ORAL at 09:23

## 2025-09-03 ASSESSMENT — PAIN DESCRIPTION - ORIENTATION
ORIENTATION: LEFT
ORIENTATION: MID

## 2025-09-03 ASSESSMENT — PAIN DESCRIPTION - DESCRIPTORS
DESCRIPTORS: ACHING
DESCRIPTORS: ACHING

## 2025-09-03 ASSESSMENT — PAIN SCALES - GENERAL
PAINLEVEL_OUTOF10: 6
PAINLEVEL_OUTOF10: 3

## 2025-09-03 ASSESSMENT — HEART SCORE: ECG: NORMAL

## 2025-09-03 ASSESSMENT — PAIN - FUNCTIONAL ASSESSMENT
PAIN_FUNCTIONAL_ASSESSMENT: 0-10
PAIN_FUNCTIONAL_ASSESSMENT: 0-10

## 2025-09-03 ASSESSMENT — PAIN DESCRIPTION - LOCATION
LOCATION: HEAD
LOCATION: CHEST

## 2025-09-04 LAB
EKG ATRIAL RATE: 62 BPM
EKG DIAGNOSIS: NORMAL
EKG P AXIS: 38 DEGREES
EKG P-R INTERVAL: 152 MS
EKG Q-T INTERVAL: 368 MS
EKG QRS DURATION: 86 MS
EKG QTC CALCULATION (BAZETT): 373 MS
EKG R AXIS: 9 DEGREES
EKG T AXIS: 22 DEGREES
EKG VENTRICULAR RATE: 62 BPM

## 2025-09-04 PROCEDURE — 93010 ELECTROCARDIOGRAM REPORT: CPT | Performed by: INTERNAL MEDICINE

## (undated) DEVICE — NEEDLE SPNL L3.5IN PNK HUB S STL REG WALL FIT STYL W/ QNCKE

## (undated) DEVICE — DRAPE SURGICAL HAND PROX AURORA

## (undated) DEVICE — TUBING, SUCTION, 9/32" X 10', STRAIGHT: Brand: MEDLINE

## (undated) DEVICE — Z INACTIVE USE 2660664 SOLUTION IRRIG 3000ML 0.9% SOD CHL USP UROMATIC PLAS CONT

## (undated) DEVICE — MARKER SURG SKIN UTIL REGULAR/FINE 2 TIP W/ RUL AND 9 LBL

## (undated) DEVICE — SHOULDER STABILIZATION KIT,                                    DISPOSABLE 12 PER BOX

## (undated) DEVICE — GLOVE ORANGE PI 8   MSG9080

## (undated) DEVICE — 3M™ IOBAN™ 2 ANTIMICROBIAL INCISE DRAPE 6650EZ: Brand: IOBAN™ 2

## (undated) DEVICE — [AGGRESSIVE PLUS CUTTER, ARTHROSCOPIC SHAVER BLADE,  DO NOT RESTERILIZE,  DO NOT USE IF PACKAGE IS DAMAGED,  KEEP DRY,  KEEP AWAY FROM SUNLIGHT]: Brand: FORMULA

## (undated) DEVICE — PENCIL ES CRD L10FT HND SWCHING ROCK SWCH W/ EDGE COAT BLDE

## (undated) DEVICE — DRESSING PETRO W3XL3IN OIL EMUL N ADH GZ KNIT IMPREG CELOS

## (undated) DEVICE — MAT FLR ABSORBENT SM 40X28 IN 4.6 LT PNK LIQUI-LOC LF DISP

## (undated) DEVICE — DRESSING,GAUZE,XEROFORM,CURAD,1"X8",ST: Brand: CURAD

## (undated) DEVICE — YANKAUER,FLEXIBLE HANDLE,REGLR CAPACITY: Brand: MEDLINE INDUSTRIES, INC.

## (undated) DEVICE — TOWEL,OR,DSP,ST,BLUE,STD,6/PK,12PK/CS: Brand: MEDLINE

## (undated) DEVICE — 3M™ STERI-DRAPE™ INSTRUMENT POUCH 1018: Brand: STERI-DRAPE™

## (undated) DEVICE — GLOVE SURG SZ 65 THK91MIL LTX FREE SYN POLYISOPRENE

## (undated) DEVICE — BLADE SAW RESECTOR CUT 4MM

## (undated) DEVICE — 3M™ STERI-DRAPE™ U-DRAPE 1015: Brand: STERI-DRAPE™

## (undated) DEVICE — INTENDED FOR TISSUE SEPARATION, AND OTHER PROCEDURES THAT REQUIRE A SHARP SURGICAL BLADE TO PUNCTURE OR CUT.: Brand: BARD-PARKER ® STAINLESS STEEL BLADES

## (undated) DEVICE — NEEDLE SUT PASS FOR ROT CUF LABRAL REP MULTFI SCORPION

## (undated) DEVICE — PROTECTOR EYE PT SELF ADH NS OPT GRD LF

## (undated) DEVICE — CLASSIC CLD THER SYS

## (undated) DEVICE — SUTURE SUTTAPE FIBERLINK 1.3MM WHT BLU CLS LOOP AR7535

## (undated) DEVICE — BANDAGE,SELF ADHRNT,COFLEX,4"X5YD,STRL: Brand: COLABEL

## (undated) DEVICE — WEREWOLF FLOW 90 COBLATION WAND: Brand: COBLATION

## (undated) DEVICE — SOLUTION SURG PREP PVP IOD 10% 8 OZ BTL SCRB CARE

## (undated) DEVICE — 3M™ STERI-DRAPE™ U-DRAPE 1067 1067 5/BX 4BX/CS/CTN&#X20;: Brand: STERI-DRAPE™

## (undated) DEVICE — 3M™ MICROFOAM™ TAPE 1528-4: Brand: 3M™ MICROFOAM™

## (undated) DEVICE — THIN OFFSET (9.0 X 0.38 X 25.0MM)

## (undated) DEVICE — MAT ABSRB W28XL48IN C6L FLR ULT W POLY BK

## (undated) DEVICE — COUNTER NDL 30 COUNT FOAM STRP SGL MAG

## (undated) DEVICE — DRAPE,U/ SHT,SPLIT,PLAS,STERIL: Brand: MEDLINE

## (undated) DEVICE — 3M™ STERI-DRAPE™ INCISE DRAPE 1050 (60CM X 45CM): Brand: STERI-DRAPE™

## (undated) DEVICE — TUBING PMP L16FT MAIN DISP FOR AR-6400 AR-6475

## (undated) DEVICE — SYRINGE IRRIG 60ML SFT PLIABLE BLB EZ TO GRP 1 HND USE W/

## (undated) DEVICE — SLING ARM XL L20IN D75IN WHT POLY MESH ENVELOP MTL SIDE

## (undated) DEVICE — Z INACTIVE NO ACTIVE SUPPLIER APPLICATOR MEDICATED 26 CC TINT HI-LITE ORNG STRL CHLORAPREP

## (undated) DEVICE — SOLUTION IRRIG 3000ML 0.9% SOD CHL USP UROMATIC PLAS CONT

## (undated) DEVICE — CANNULA ARTHSCP L7CM DIA7MM TRNSLUC THRD FLX W/ NO SQUIRT

## (undated) DEVICE — HEWSON SUTURE RETRIEVER: Brand: HEWSON SUTURE RETRIEVER

## (undated) DEVICE — SUTURE ETHLN SZ 3-0 L30IN NONABSORBABLE BLK FS-1 L24MM 3/8 669H

## (undated) DEVICE — GLOVE SURG SZ 8 L12IN THK75MIL DK GRN LTX FREE

## (undated) DEVICE — BANDAGE COMPR ELASTIC 9 FTX4 IN STRL ESMARCH LF

## (undated) DEVICE — SPONGE LAP W18XL18IN WHT COT 4 PLY FLD STRUNG RADPQ DISP ST 2 PER PACK

## (undated) DEVICE — ELECTRODE ES AD CRDLSS PT RET REM POLYHESIVE

## (undated) DEVICE — COTTON UNDERCAST PADDING,CRIMPED FINISH: Brand: WEBRIL